# Patient Record
Sex: FEMALE | Race: WHITE | Employment: UNEMPLOYED | URBAN - METROPOLITAN AREA
[De-identification: names, ages, dates, MRNs, and addresses within clinical notes are randomized per-mention and may not be internally consistent; named-entity substitution may affect disease eponyms.]

---

## 2017-02-04 ENCOUNTER — TELEPHONE (OUTPATIENT)
Dept: INTERNAL MEDICINE CLINIC | Age: 67
End: 2017-02-04

## 2017-02-04 DIAGNOSIS — M62.830 SPASM OF LUMBAR PARASPINOUS MUSCLE: Primary | ICD-10-CM

## 2017-02-04 RX ORDER — CYCLOBENZAPRINE HCL 5 MG
5 TABLET ORAL
Qty: 50 TAB | Refills: 2 | Status: SHIPPED | OUTPATIENT
Start: 2017-02-04 | End: 2018-09-24 | Stop reason: SDUPTHER

## 2017-02-04 NOTE — TELEPHONE ENCOUNTER
Pt with worsened lower back pain, spasms today. Similar problem in past has improved with muscle relaxant. Asking for refill on flexeril. New medication or Refill was escribed to patient's pharmacy as requested.

## 2017-02-07 ENCOUNTER — APPOINTMENT (OUTPATIENT)
Dept: GENERAL RADIOLOGY | Age: 67
End: 2017-02-07
Attending: EMERGENCY MEDICINE
Payer: MEDICARE

## 2017-02-07 ENCOUNTER — HOSPITAL ENCOUNTER (EMERGENCY)
Age: 67
Discharge: HOME | End: 2017-02-07
Attending: EMERGENCY MEDICINE
Payer: MEDICARE

## 2017-02-07 VITALS
TEMPERATURE: 97.6 F | SYSTOLIC BLOOD PRESSURE: 118 MMHG | RESPIRATION RATE: 16 BRPM | HEART RATE: 74 BPM | OXYGEN SATURATION: 97 % | DIASTOLIC BLOOD PRESSURE: 70 MMHG

## 2017-02-07 DIAGNOSIS — M54.41 ACUTE RIGHT-SIDED LOW BACK PAIN WITH RIGHT-SIDED SCIATICA: Primary | ICD-10-CM

## 2017-02-07 LAB
APPEARANCE UR: CLEAR
BACTERIA URNS QL MICRO: NEGATIVE /HPF
BILIRUB UR QL: NEGATIVE
COLOR UR: ABNORMAL
EPITH CASTS URNS QL MICRO: ABNORMAL /LPF
GLUCOSE UR STRIP.AUTO-MCNC: NEGATIVE MG/DL
HGB UR QL STRIP: ABNORMAL
KETONES UR QL STRIP.AUTO: NEGATIVE MG/DL
LEUKOCYTE ESTERASE UR QL STRIP.AUTO: NEGATIVE
NITRITE UR QL STRIP.AUTO: NEGATIVE
PH UR STRIP: 7 [PH] (ref 5–8)
PROT UR STRIP-MCNC: NEGATIVE MG/DL
RBC #/AREA URNS HPF: ABNORMAL /HPF (ref 0–5)
SP GR UR REFRACTOMETRY: 1.01 (ref 1–1.03)
UA: UC IF INDICATED,UAUC: ABNORMAL
UROBILINOGEN UR QL STRIP.AUTO: 0.2 EU/DL (ref 0.2–1)
WBC URNS QL MICRO: ABNORMAL /HPF (ref 0–4)

## 2017-02-07 PROCEDURE — 72100 X-RAY EXAM L-S SPINE 2/3 VWS: CPT

## 2017-02-07 PROCEDURE — 81001 URINALYSIS AUTO W/SCOPE: CPT | Performed by: EMERGENCY MEDICINE

## 2017-02-07 PROCEDURE — 99283 EMERGENCY DEPT VISIT LOW MDM: CPT

## 2017-02-07 PROCEDURE — 74011250637 HC RX REV CODE- 250/637: Performed by: EMERGENCY MEDICINE

## 2017-02-07 PROCEDURE — 73502 X-RAY EXAM HIP UNI 2-3 VIEWS: CPT

## 2017-02-07 RX ORDER — HYDROCODONE BITARTRATE AND ACETAMINOPHEN 5; 325 MG/1; MG/1
1 TABLET ORAL
Status: COMPLETED | OUTPATIENT
Start: 2017-02-07 | End: 2017-02-07

## 2017-02-07 RX ORDER — METHYLPREDNISOLONE 4 MG/1
TABLET ORAL
Qty: 1 DOSE PACK | Refills: 0 | Status: SHIPPED | OUTPATIENT
Start: 2017-02-07 | End: 2017-07-11 | Stop reason: ALTCHOICE

## 2017-02-07 RX ORDER — HYDROCODONE BITARTRATE AND ACETAMINOPHEN 5; 325 MG/1; MG/1
1 TABLET ORAL
Qty: 20 TAB | Refills: 0 | Status: SHIPPED | OUTPATIENT
Start: 2017-02-07 | End: 2017-07-11 | Stop reason: ALTCHOICE

## 2017-02-07 RX ORDER — DIAZEPAM 5 MG/1
2.5 TABLET ORAL
Status: COMPLETED | OUTPATIENT
Start: 2017-02-07 | End: 2017-02-07

## 2017-02-07 RX ADMIN — DIAZEPAM 2.5 MG: 5 TABLET ORAL at 18:35

## 2017-02-07 RX ADMIN — HYDROCODONE BITARTRATE AND ACETAMINOPHEN 1 TABLET: 5; 325 TABLET ORAL at 18:35

## 2017-02-07 NOTE — ED PROVIDER NOTES
Patient is a 78 y.o. female presenting with back pain. Back Pain    Associated symptoms include weakness. Pertinent negatives include no fever, no numbness, no headaches, no abdominal pain and no dysuria. 77 yo WF presents with right lower back pain radiating to right hip onset about 1 week ago. No obvious injury but was lifting her mattress to make her bed prior to onset of pain. Pain worse with movement. Denies bowel/bladder incontinence or saddle anesthesia, fever, chills, dysuria, hematuria. Hx of epidural injections, last done . Taking flexeril and celebrex for symptoms. Past Medical History:   Diagnosis Date    Agatston coronary artery calcium score less than 100 2015     zero    Angiomyolipoma of right kidney      3.3 cm upper pole 11/8/15    AR (allergic rhinitis)     Colon polyp 2009     Tubular adenoma. repeat 3/2013, 3/2014 2 polyps    DDD (degenerative disc disease), lumbar      Dr. Heidi Carr. L3-L4, L4-L5 injections . radiates to left knee    GERD (gastroesophageal reflux disease)     Gluten intolerance     Hyperlipidemia 2009    Osteopenia 2014     mild 14 T-1.6 L fem neck    Raynaud phenomenon     SCC (squamous cell carcinoma)      right forearm.  Dr. Gabriel Aden       Past Surgical History:   Procedure Laterality Date    Hx gi       perianal cyst    Hx hysterectomy      Hx oophorectomy      Hx  section       2    Vascular surgery procedure unlist       venous stripping R    Hx breast reduction      Hx tonsillectomy      Hx heent       face lift    Hx colonoscopy  3/31/14     2 polyps    Hx orthopaedic       L shoulder     Hx orthopaedic       L ankle surgery    Hx knee arthroscopy       L knee - Zaslov    Hx knee replacement Left 2012         Family History:   Problem Relation Age of Onset    Heart Failure Mother     Hypertension Mother     Stroke Mother     High Cholesterol Mother     Thyroid Disease Mother    Alyne Yoly Rashes/Skin Problems Mother     Cancer Mother      uterine    Heart Disease Mother      premature    Heart Attack Mother     Hypertension Father     Heart Failure Father     High Cholesterol Father     Dementia Father     Heart Disease Father     Stroke Father     Other Sister      deep venous thromboses and Pulmonary embolus    Cancer Paternal Aunt      colon    Diabetes Paternal Grandmother     Cancer Other      colon       Social History     Social History    Marital status:      Spouse name: N/A    Number of children: N/A    Years of education: N/A     Occupational History    Not on file. Social History Main Topics    Smoking status: Never Smoker    Smokeless tobacco: Never Used    Alcohol use 1.0 oz/week     2 Glasses of wine per week    Drug use: No    Sexual activity: No     Other Topics Concern    Not on file     Social History Narrative         ALLERGIES: Alfentanil; Gluten; Levaquin [levofloxacin]; Morphine; Percocet [oxycodone-acetaminophen]; and Tramadol    Review of Systems   Constitutional: Negative for chills and fever. Respiratory: Negative for cough and shortness of breath. Gastrointestinal: Negative for abdominal pain, nausea and vomiting. Genitourinary: Negative for dysuria and hematuria. Musculoskeletal: Positive for back pain. Negative for neck pain and neck stiffness. Skin: Negative for rash and wound. Neurological: Positive for weakness. Negative for numbness and headaches. All other systems reviewed and are negative. There were no vitals filed for this visit.          Physical Exam   Physical Examination: General appearance - alert, mild distress, oriented to person, place, and time and normal appearing weight  Eyes - pupils equal and reactive, extraocular eye movements intact  Neck - supple, no significant adenopathy  Chest - clear to auscultation, no wheezes, rales or rhonchi, symmetric air entry  Heart - normal rate, regular rhythm, normal S1, S2, no murmurs, rubs, clicks or gallops  Abdomen - soft, nontender, nondistended, no masses or organomegaly  Back exam - full range of motion, no midline spinal tenderness or stepoff, tenderness to right paraspinal muscles in lumbar region, no rash  Neurological - alert, oriented, normal speech, no focal findings or movement disorder noted  Musculoskeletal - no joint tenderness, deformity or swelling  Extremities - peripheral pulses normal, no pedal edema, no clubbing or cyanosis  Skin - normal coloration and turgor, no rashes, no suspicious skin lesions noted  MDM  Number of Diagnoses or Management Options  Acute right-sided low back pain with right-sided sciatica:      Amount and/or Complexity of Data Reviewed  Clinical lab tests: ordered and reviewed  Tests in the radiology section of CPT®: ordered and reviewed  Decide to obtain previous medical records or to obtain history from someone other than the patient: yes  Obtain history from someone other than the patient: yes (sister)  Review and summarize past medical records: yes  Independent visualization of images, tracings, or specimens: yes    Patient Progress  Patient progress: stable    ED Course       Procedures  Pt with right low back pain radiating down right leg, no CVAT or abdominal tenderness. Denies fever, chills, bowel/bladder incontinence or saddle anesthesia. Will tx with steroids and pain medication. Pt with hx of low back pain and steroid injections, followed at [facility]. Will d/c with pcp/ortho f/u. Pt to return to ED for worsening symptoms.

## 2017-02-07 NOTE — ED TRIAGE NOTES
Patient presents ambulatory to treatment area with a steady gait. Patient complains of right sided lower back and hip pain that started about one week ago and has gotten worse since onset. Patient states she has been to the chiropractor for the pain three times since pain onset. Patient states she has a \"bulging disc\" on the left side, but the right sided pain is new. PCP called a prescription in for flexeril; patient states \"I have been using quite a bit of it\". Patient denies loss of control of bowel or bladder.

## 2017-02-08 NOTE — ED NOTES
Patient has returned from x-ray in no distress; moderate discomfort. Patient given blanket for comfort. Family at bedside. Call bell in reach. Will continue to monitor.

## 2017-02-08 NOTE — ED NOTES
The patient was discharged home by Dr. Jameel Rodriguez in stable condition. The patient is alert and oriented, in no respiratory distress and discharge vital signs obtained. The patient's diagnosis, condition and treatment were explained. The patient expressed understanding. Two prescriptions given. No work/school note given. A discharge plan has been developed. A  was not involved in the process. Aftercare instructions were given. Pt ambulatory out of the ED.

## 2017-02-08 NOTE — DISCHARGE INSTRUCTIONS
Back Pain: Care Instructions  Your Care Instructions    Back pain has many possible causes. It is often related to problems with muscles and ligaments of the back. It may also be related to problems with the nerves, discs, or bones of the back. Moving, lifting, standing, sitting, or sleeping in an awkward way can strain the back. Sometimes you don't notice the injury until later. Arthritis is another common cause of back pain. Although it may hurt a lot, back pain usually improves on its own within several weeks. Most people recover in 12 weeks or less. Using good home treatment and being careful not to stress your back can help you feel better sooner. Follow-up care is a key part of your treatment and safety. Be sure to make and go to all appointments, and call your doctor if you are having problems. Its also a good idea to know your test results and keep a list of the medicines you take. How can you care for yourself at home? · Sit or lie in positions that are most comfortable and reduce your pain. Try one of these positions when you lie down:  ¨ Lie on your back with your knees bent and supported by large pillows. ¨ Lie on the floor with your legs on the seat of a sofa or chair. Clovia Evener on your side with your knees and hips bent and a pillow between your legs. ¨ Lie on your stomach if it does not make pain worse. · Do not sit up in bed, and avoid soft couches and twisted positions. Bed rest can help relieve pain at first, but it delays healing. Avoid bed rest after the first day of back pain. · Change positions every 30 minutes. If you must sit for long periods of time, take breaks from sitting. Get up and walk around, or lie in a comfortable position. · Try using a heating pad on a low or medium setting for 15 to 20 minutes every 2 or 3 hours. Try a warm shower in place of one session with the heating pad. · You can also try an ice pack for 10 to 15 minutes every 2 to 3 hours.  Put a thin cloth between the ice pack and your skin. · Take pain medicines exactly as directed. ¨ If the doctor gave you a prescription medicine for pain, take it as prescribed. ¨ If you are not taking a prescription pain medicine, ask your doctor if you can take an over-the-counter medicine. · Take short walks several times a day. You can start with 5 to 10 minutes, 3 or 4 times a day, and work up to longer walks. Walk on level surfaces and avoid hills and stairs until your back is better. · Return to work and other activities as soon as you can. Continued rest without activity is usually not good for your back. · To prevent future back pain, do exercises to stretch and strengthen your back and stomach. Learn how to use good posture, safe lifting techniques, and proper body mechanics. When should you call for help? Call your doctor now or seek immediate medical care if:  · You have new or worsening numbness in your legs. · You have new or worsening weakness in your legs. (This could make it hard to stand up.)  · You lose control of your bladder or bowels. Watch closely for changes in your health, and be sure to contact your doctor if:  · Your pain gets worse. · You are not getting better after 2 weeks. Where can you learn more? Go to http://catrachito-chetan.info/. Enter H684 in the search box to learn more about \"Back Pain: Care Instructions. \"  Current as of: May 23, 2016  Content Version: 11.1  © 5016-6495 [employer]. Care instructions adapted under license by [employer] (which disclaims liability or warranty for this information). If you have questions about a medical condition or this instruction, always ask your healthcare professional. [address] any warranty or liability for your use of this information. Sciatica: Exercises  Your Care Instructions  Here are some examples of typical rehabilitation exercises for your condition.  Start each exercise slowly. Ease off the exercise if you start to have pain. Your doctor or physical therapist will tell you when you can start these exercises and which ones will work best for you. When you are not being active, find a comfortable position for rest. Some people are comfortable on the floor or a medium-firm bed with a small pillow under their head and another under their knees. Some people prefer to lie on their side with a pillow between their knees. Don't stay in one position for too long. Take short walks (10 to 20 minutes) every 2 to 3 hours. Avoid slopes, hills, and stairs until you feel better. Walk only distances you can manage without pain, especially leg pain. How to do the exercises  Back stretches    1. Get down on your hands and knees on the floor. 2. Relax your head and allow it to droop. Round your back up toward the ceiling until you feel a nice stretch in your upper, middle, and lower back. Hold this stretch for as long as it feels comfortable, or about 15 to 30 seconds. 3. Return to the starting position with a flat back while you are on your hands and knees. 4. Let your back sway by pressing your stomach toward the floor. Lift your buttocks toward the ceiling. 5. Hold this position for 15 to 30 seconds. 6. Repeat 2 to 4 times. Follow-up care is a key part of your treatment and safety. Be sure to make and go to all appointments, and call your doctor if you are having problems. It's also a good idea to know your test results and keep a list of the medicines you take. Where can you learn more? Go to http://catrachito-chetan.info/. Enter R759 in the search box to learn more about \"Sciatica: Exercises. \"  Current as of: May 23, 2016  Content Version: 11.1  © 2659-2814 [employer]. Care instructions adapted under license by [employer] (which disclaims liability or warranty for this information).  If you have questions about a medical condition or this instruction, always ask your healthcare professional. [place] [address] any warranty or liability for your use of this information. Sciatica: Care Instructions  Your Care Instructions    Sciatica (say \"noh-XJ-tb-kuh\") is an irritation of one of the sciatic nerves, which come from the spinal cord in the lower back. The sciatic nerves and their branches extend down through the buttock to the foot. Sciatica can develop when an injured disc in the back presses against a spinal nerve root. Its main symptom is pain, numbness, or weakness that is often worse in the leg or foot than in the back. Sciatica often will improve and go away with time. Early treatment usually includes medicines and exercises to relieve pain. Follow-up care is a key part of your treatment and safety. Be sure to make and go to all appointments, and call your doctor if you are having problems. It's also a good idea to know your test results and keep a list of the medicines you take. How can you care for yourself at home? · Take pain medicines exactly as directed. ¨ If the doctor gave you a prescription medicine for pain, take it as prescribed. ¨ If you are not taking a prescription pain medicine, ask your doctor if you can take an over-the-counter medicine. · Use heat or ice to relieve pain. ¨ To apply heat, put a warm water bottle, heating pad set on low, or warm cloth on your back. Do not go to sleep with a heating pad on your skin. ¨ To use ice, put ice or a cold pack on the area for 10 to 20 minutes at a time. Put a thin cloth between the ice and your skin. · Avoid sitting if possible, unless it feels better than standing. · Alternate lying down with short walks. Increase your walking distance as you are able to without making your symptoms worse. · Do not do anything that makes your symptoms worse. When should you call for help? Call 911 anytime you think you may need emergency care.  For example, call if:  · You are unable to move a leg at all. Call your doctor now or seek immediate medical care if:  · You have new or worse symptoms in your legs or buttocks. Symptoms may include:  ¨ Numbness or tingling. ¨ Weakness. ¨ Pain. · You lose bladder or bowel control. Watch closely for changes in your health, and be sure to contact your doctor if:  · You are not getting better as expected. Where can you learn more? Go to http://catrachito-chetan.info/. Enter [phone] in the search box to learn more about \"Sciatica: Care Instructions. \"  Current as of: May 23, 2016  Content Version: 11.1  © 4872-7828 [employer]. Care instructions adapted under license by [employer] (which disclaims liability or warranty for this information). If you have questions about a medical condition or this instruction, always ask your healthcare professional. [place] [address] any warranty or liability for your use of this information. We hope that we have addressed all of your medical concerns. The examination and treatment you received in the Emergency Department were for an emergent problem and were not intended as complete care. It is important that you follow up with your healthcare provider(s) for ongoing care. If your symptoms worsen or do not improve as expected, and you are unable to reach your usual health care provider(s), you should return to the Emergency Department. Today's healthcare is undergoing tremendous change, and patient satisfaction surveys are one of the many tools to assess the quality of medical care. You may receive a survey from the [employer] organization regarding your experience in the Emergency Department. I hope that your experience has been completely positive, particularly the medical care that I provided. As such, please participate in the survey; anything less than excellent does not meet my expectations or intentions. [address] [facility] and [facility] participate in nationally recognized quality of care measures. If your blood pressure is greater than 120/80, as reported below, we urge that you seek medical care to address the potential of high blood pressure, commonly known as hypertension. Hypertension can be hereditary or can be caused by certain medical conditions, pain, stress, or \"white coat syndrome. \"       Please make an appointment with your health care provider(s) for follow up of your Emergency Department visit. VITALS:   Patient Vitals for the past 8 hrs:   Temp Pulse Resp BP SpO2   02/07/17 1805 97.6 °F (36.4 °C) 84 20 147/75 98 %          Thank you for allowing us to provide you with medical care today. We realize that you have many choices for your emergency care needs. Please choose us in the future for any continued health care needs. Constantin Mcmahan Steve Patrick, [address] [place] [address] 20.   Office: [phone]            Recent Results (from the past 24 hour(s))   URINALYSIS W/ REFLEX CULTURE    Collection Time: 02/07/17  6:37 PM   Result Value Ref Range    Color YELLOW/STRAW      Appearance CLEAR CLEAR      Specific gravity 1.015 1.003 - 1.030      pH (UA) 7.0 5.0 - 8.0      Protein NEGATIVE  NEG mg/dL    Glucose NEGATIVE  NEG mg/dL    Ketone NEGATIVE  NEG mg/dL    Bilirubin NEGATIVE  NEG      Blood TRACE (A) NEG      Urobilinogen 0.2 0.2 - 1.0 EU/dL    Nitrites NEGATIVE  NEG      Leukocyte Esterase NEGATIVE  NEG      WBC 0-4 0 - 4 /hpf    RBC 0-5 0 - 5 /hpf    Epithelial cells FEW FEW /lpf    Bacteria NEGATIVE  NEG /hpf    UA:UC IF INDICATED CULTURE NOT INDICATED BY UA RESULT CNI         Xr Spine Lumb 2 Or 3 V    Result Date: 2/7/2017  EXAM:  XR SPINE LUMB 2 OR 3 V INDICATION:   low back pain COMPARISON: CT of the abdomen and pelvis, 8/16/2016 .  FINDINGS: AP, lateral and spot lateral views of the lumbar spine demonstrate normal alignment. Transitional L5 vertebral body. The vertebral body heights and disc spaces are well-preserved. No visible fracture or subluxation . IMPRESSION:  1. No visible fracture or subluxation. 2. Nonfocal degenerative changes with a transitional L5 vertebral body. Xr Hip Rt W Or Wo Pelv 2-3 Vws    Result Date: 2/7/2017  EXAM:  XR HIP RT W OR WO PELV 2-3 VWS INDICATION:   pain. Additional history: Right-sided lower back pain and hip pain that started about one week previously. Pain has progressed. COMPARISON: None. Peggyann Gang FINDINGS: An AP view of the pelvis and a frogleg lateral view of the right hip demonstrate no fracture, dislocation or other acute abnormality. .    IMPRESSION:  No acute abnormality.

## 2017-02-09 ENCOUNTER — TELEPHONE (OUTPATIENT)
Dept: INTERNAL MEDICINE CLINIC | Age: 67
End: 2017-02-09

## 2017-02-09 NOTE — TELEPHONE ENCOUNTER
Reached out to patient to schedule ED follow up appointment to address back pain. No answer left V/M.

## 2017-02-09 NOTE — TELEPHONE ENCOUNTER
----- Message from Wilbert Veliz sent at 2/9/2017 12:42 PM EST -----  Regarding: Port/telephone  Pt stated she is having back pain and she is requesting to have an MRI or a CT scan. Pts number is [phone].

## 2017-02-10 ENCOUNTER — PATIENT OUTREACH (OUTPATIENT)
Dept: INTERNAL MEDICINE CLINIC | Age: 67
End: 2017-02-10

## 2017-02-10 NOTE — PROGRESS NOTES
NNTOCED Call    Patient discharged on 2/7/17 from [facility]. Diagnosis: Acute right-sided low back pain. Spoke with patient (identified by 2 patient identifiers) today. Home medication reconciliation completed & patient verbalizes understanding of medications. Reviewed discharge instructions with the patient & patient verbalizes understanding. Patient understands when to seek medical attention from PCP. Patient verbalizes understanding of discharge plan and will follow up with PCP Dr. Haroon Julien on 2/13/17. Pt stated that she continues to have lumbar pain radiating around to right hip. 6/10 on pain scale with pain med. Pt denies issues with urination, N/V, diarrhea/constipation or abdominal pain. Patient was given the opportunity to ask questions & contact information was provided for future reference or further questions. This note will not be viewable in [address]

## 2017-02-13 ENCOUNTER — OFFICE VISIT (OUTPATIENT)
Dept: INTERNAL MEDICINE CLINIC | Age: 67
End: 2017-02-13

## 2017-02-13 VITALS
BODY MASS INDEX: 26.75 KG/M2 | WEIGHT: 151 LBS | TEMPERATURE: 98.7 F | RESPIRATION RATE: 12 BRPM | HEIGHT: 63 IN | HEART RATE: 85 BPM | SYSTOLIC BLOOD PRESSURE: 114 MMHG | DIASTOLIC BLOOD PRESSURE: 76 MMHG

## 2017-02-13 DIAGNOSIS — M51.36 DDD (DEGENERATIVE DISC DISEASE), LUMBAR: ICD-10-CM

## 2017-02-13 DIAGNOSIS — M54.16 ACUTE RIGHT LUMBAR RADICULOPATHY: Primary | ICD-10-CM

## 2017-02-13 NOTE — PROGRESS NOTES
HISTORY OF PRESENT ILLNESS    Chief Complaint   Patient presents with    Back Pain     Reports right lower back pain radiating to right buttock and lateral thigh to knee for 3 weeks. See in ER and given medrol with mild relief since . S/s are worsening in the afternoon since she is weaning medrol. Taking flexeril and norco prn which help, but she does not like taking meds. Seeing chiropractor with some relief over the years for lumbar DDD and she feels imaging is needed. chiro is using minimal manipulation, e-stim as well  Hx of cortisone injections x2 2-3 years ago  Saw Dr. Vignesh Grady 2 years ago and was told her left leg pain may be partially due to her left knee  Was previously seen by Dr. Gordon Finely  She also saw Dr. Kimani Pacheco and Dr. Cecily Montez in the past.    Lenka Mcginnis in [place] showed 1. No visible fracture or subluxation. 2. Nonfocal degenerative changes with a transitional L5 vertebral body. Review of Systems   All other systems reviewed and are negative, except as noted in HPI    Past Medical and Surgical History   has a past medical history of Agatston coronary artery calcium score less than 100 (2015); Angiomyolipoma of right kidney; AR (allergic rhinitis); Colon polyp (2009); DDD (degenerative disc disease), lumbar; GERD (gastroesophageal reflux disease); Gluten intolerance; Hyperlipidemia (2009); Osteopenia (2014); Raynaud phenomenon; and SCC (squamous cell carcinoma) (). She also has no past medical history of Abuse; Anemia NEC; Arrhythmia; Asthma; Autoimmune disease ([address] CAD (coronary artery disease); Calculus of kidney; Chronic kidney disease; Chronic pain; Congestive heart failure, unspecified; COPD; Depression; Diabetes ([address] Headache(784.0); Hypertension; Liver disease; Psychotic disorder; PUD (peptic ulcer disease); Seizures ([address] Stroke [facility]); Thromboembolus ([address] Thyroid disease; or Trauma.    has a past surgical history that includes gi; hysterectomy; oophorectomy;  section; vascular surgery procedure unlist; breast reduction (2009); tonsillectomy; heent; colonoscopy (3/31/14); orthopaedic; orthopaedic; knee arthroscopy; and knee replacement (Left, 1/2012). reports that she has never smoked. She has never used smokeless tobacco. She reports that she drinks about 1.0 oz of alcohol per week  She reports that she does not use illicit drugs. family history includes Cancer in her mother, paternal aunt, and another family member; Dementia in her father; Diabetes in her paternal grandmother; Heart Attack in her mother; Heart Disease in her father and mother; Heart Failure in her father and mother; High Cholesterol in her father and mother; Hypertension in her father and mother; Other in her sister; Rashes/Skin Problems in her mother; Stroke in her father and mother; Thyroid Disease in her mother. Physical Exam   Nursing note and vitals reviewed. Blood pressure 114/76, pulse 85, temperature 98.7 °F (37.1 °C), temperature source Oral, resp. rate 12, height 5' 3\" (1.6 m), weight 151 lb (68.5 kg), last menstrual period 01/01/2000. Constitutional:  No distress. Eyes: Conjunctivae are normal.   Ears:  Hearing grossly intact  Cardiovascular: Normal rate. regular rhythm, no murmurs or gallops  No edema  Pulmonary/Chest: Effort normal.   CTAB  Musculoskeletal: moves all 4 extremities   Positive SLR on right at 45 degrees. No weakness. Toes downgoing. 5/5 DTRs  Neurological: Alert and oriented to person, place, and time. Skin: No rash noted. Psychiatric: Normal mood and affect. Behavior is normal.     ASSESSMENT and PLAN  Mumtaz Silvestre was seen today for back pain. Diagnoses and all orders for this visit:    Acute right lumbar radiculopathy  DDD (degenerative disc disease), lumbar  Previous MRI did not show an explanation and EMG was normal.   Needs repeat MRI.    Consider neurology consult if normal.    -     MRI LUMB SPINE WO CONT; Future      There are no Patient Instructions on file for this visit.    lab results and schedule of future lab studies reviewed with patient  reviewed medications and side effects in detail    Return to clinic for further evaluation if new symptoms develop    Follow-up Disposition: Not on File    Current Outpatient Prescriptions   Medication Sig    methylPREDNISolone (MEDROL, FERMIN,) 4 mg tablet Use as directed    HYDROcodone-acetaminophen (NORCO) 5-325 mg per tablet Take 1 Tab by mouth every four (4) hours as needed for Pain. Max Daily Amount: 6 Tabs.  cyclobenzaprine (FLEXERIL) 5 mg tablet Take 1 Tab by mouth three (3) times daily as needed for Muscle Spasm(s).  pravastatin (PRAVACHOL) 20 mg tablet TAKE 1 TABLET BY MOUTH NIGHTLY    celecoxib (CELEBREX) 200 mg capsule TAKE ONE CAPSULE BY MOUTH EVERY DAY    ondansetron (ZOFRAN ODT) 4 mg disintegrating tablet Take 1 Tab by mouth every eight (8) hours as needed for Nausea.  estradiol (ESTRACE) 0.01 % (0.1 mg/gram) vaginal cream USE 1 GRAM VAGINALLY 3 TIMES WEEKLY    cholecalciferol, vitamin D3, (VITAMIN D3) 2,000 unit tab Take  by mouth.  DOCOSAHEXANOIC ACID/EPA (FISH OIL PO) Take  by mouth.  coenzyme q10 (CO Q-10) 10 mg cap Take  by mouth.  ASCORBATE CALCIUM (VITAMIN C PO) Take  by mouth.  esomeprazole (NEXIUM) 40 mg capsule Take 1 capsule by mouth daily.  fluticasone (FLONASE) 50 mcg/actuation nasal spray USE 2 SPRAYS IN EACH NOSTRIL DAILY AS NEEDED FOR ALLERGIES    loratadine (CLARITIN) 10 mg tablet Take 10 mg by mouth daily.  MULTIVITAMIN (MULTIPLE VITAMIN PO) Take  by mouth. No current facility-administered medications for this visit.

## 2017-02-22 ENCOUNTER — HOSPITAL ENCOUNTER (OUTPATIENT)
Dept: MRI IMAGING | Age: 67
Discharge: HOME | End: 2017-02-22
Attending: INTERNAL MEDICINE
Payer: MEDICARE

## 2017-02-22 DIAGNOSIS — M51.36 DDD (DEGENERATIVE DISC DISEASE), LUMBAR: ICD-10-CM

## 2017-02-22 DIAGNOSIS — M54.16 ACUTE RIGHT LUMBAR RADICULOPATHY: ICD-10-CM

## 2017-02-22 PROCEDURE — 72148 MRI LUMBAR SPINE W/O DYE: CPT

## 2017-02-23 ENCOUNTER — TELEPHONE (OUTPATIENT)
Dept: INTERNAL MEDICINE CLINIC | Age: 67
End: 2017-02-23

## 2017-02-23 DIAGNOSIS — G95.89 LUMBAR EPIDURAL MASS (HCC): Primary | ICD-10-CM

## 2017-02-23 NOTE — TELEPHONE ENCOUNTER
Pt would like to have the MRI with contrast first and take the disc with her to Neurologist appointment .

## 2017-02-23 NOTE — TELEPHONE ENCOUNTER
Pt call in would like a call back from the Dr Eduardo Puente regarding the MRI results from yesterday. If Dr Eduardo Puente  could call back with the results. she is having problems with "[email or web address] I did give number for the helpline.

## 2017-02-27 ENCOUNTER — HOSPITAL ENCOUNTER (OUTPATIENT)
Dept: MRI IMAGING | Age: 67
Discharge: HOME | End: 2017-02-27
Attending: INTERNAL MEDICINE
Payer: MEDICARE

## 2017-02-27 VITALS — BODY MASS INDEX: 26.04 KG/M2 | WEIGHT: 147 LBS

## 2017-02-27 DIAGNOSIS — G95.89 LUMBAR EPIDURAL MASS (HCC): ICD-10-CM

## 2017-02-27 PROCEDURE — 74011250636 HC RX REV CODE- 250/636: Performed by: RADIOLOGY

## 2017-02-27 PROCEDURE — 72149 MRI LUMBAR SPINE W/DYE: CPT

## 2017-02-27 PROCEDURE — A9585 GADOBUTROL INJECTION: HCPCS | Performed by: RADIOLOGY

## 2017-02-27 RX ADMIN — GADOBUTROL 6.5 ML: 604.72 INJECTION INTRAVENOUS at 07:25

## 2017-07-04 RX ORDER — PRAVASTATIN SODIUM 20 MG/1
TABLET ORAL
Qty: 30 TAB | Refills: 5 | Status: SHIPPED | OUTPATIENT
Start: 2017-07-04 | End: 2018-01-07 | Stop reason: SDUPTHER

## 2017-07-11 ENCOUNTER — HOSPITAL ENCOUNTER (OUTPATIENT)
Dept: LAB | Age: 67
Discharge: HOME | End: 2017-07-11
Payer: MEDICARE

## 2017-07-11 ENCOUNTER — HOSPITAL ENCOUNTER (OUTPATIENT)
Dept: GENERAL RADIOLOGY | Age: 67
Discharge: HOME | End: 2017-07-11
Attending: INTERNAL MEDICINE
Payer: MEDICARE

## 2017-07-11 ENCOUNTER — OFFICE VISIT (OUTPATIENT)
Dept: INTERNAL MEDICINE CLINIC | Age: 67
End: 2017-07-11

## 2017-07-11 VITALS
DIASTOLIC BLOOD PRESSURE: 75 MMHG | HEART RATE: 79 BPM | WEIGHT: 156 LBS | HEIGHT: 63 IN | TEMPERATURE: 98 F | RESPIRATION RATE: 12 BRPM | SYSTOLIC BLOOD PRESSURE: 115 MMHG | BODY MASS INDEX: 27.64 KG/M2

## 2017-07-11 DIAGNOSIS — Z00.00 PREVENTATIVE HEALTH CARE: ICD-10-CM

## 2017-07-11 DIAGNOSIS — E55.9 VITAMIN D INSUFFICIENCY: ICD-10-CM

## 2017-07-11 DIAGNOSIS — K63.5 POLYP OF COLON, UNSPECIFIED PART OF COLON, UNSPECIFIED TYPE: ICD-10-CM

## 2017-07-11 DIAGNOSIS — M85.80 OSTEOPENIA, UNSPECIFIED LOCATION: Chronic | ICD-10-CM

## 2017-07-11 DIAGNOSIS — Z23 ENCOUNTER FOR IMMUNIZATION: ICD-10-CM

## 2017-07-11 DIAGNOSIS — Z78.0 ASYMPTOMATIC MENOPAUSAL STATE: ICD-10-CM

## 2017-07-11 DIAGNOSIS — R10.31 COLICKY RLQ ABDOMINAL PAIN: ICD-10-CM

## 2017-07-11 DIAGNOSIS — Z00.00 INITIAL MEDICARE ANNUAL WELLNESS VISIT: Primary | ICD-10-CM

## 2017-07-11 DIAGNOSIS — M51.36 DDD (DEGENERATIVE DISC DISEASE), LUMBAR: ICD-10-CM

## 2017-07-11 DIAGNOSIS — E78.5 HYPERLIPIDEMIA, UNSPECIFIED HYPERLIPIDEMIA TYPE: ICD-10-CM

## 2017-07-11 PROCEDURE — 74000 XR ABD (KUB): CPT

## 2017-07-11 PROCEDURE — 80053 COMPREHEN METABOLIC PANEL: CPT

## 2017-07-11 PROCEDURE — 85027 COMPLETE CBC AUTOMATED: CPT

## 2017-07-11 PROCEDURE — 80061 LIPID PANEL: CPT

## 2017-07-11 PROCEDURE — 82306 VITAMIN D 25 HYDROXY: CPT

## 2017-07-11 NOTE — PROGRESS NOTES
Subjective:       Dino Steward is a 78 y.o. female who returns today for follow up of Hyperlipidemia with a lipid program recheck. Audie Baeza indicates her exercise level as {exercise level:59313}. Diet: ***  Medications:   Current Outpatient Prescriptions   Medication Sig Dispense Refill    pravastatin (PRAVACHOL) 20 mg tablet TAKE 1 TABLET BY MOUTH NIGHTLY 30 Tab 5    cyclobenzaprine (FLEXERIL) 5 mg tablet Take 1 Tab by mouth three (3) times daily as needed for Muscle Spasm(s). 50 Tab 2    celecoxib (CELEBREX) 200 mg capsule TAKE ONE CAPSULE BY MOUTH EVERY DAY 60 Cap 3    estradiol (ESTRACE) 0.01 % (0.1 mg/gram) vaginal cream USE 1 GRAM VAGINALLY 3 TIMES WEEKLY 42.5 g 3    cholecalciferol, vitamin D3, (VITAMIN D3) 2,000 unit tab Take  by mouth.  DOCOSAHEXANOIC ACID/EPA (FISH OIL PO) Take  by mouth.  coenzyme q10 (CO Q-10) 10 mg cap Take  by mouth.  ASCORBATE CALCIUM (VITAMIN C PO) Take  by mouth.  esomeprazole (NEXIUM) 40 mg capsule Take 1 capsule by mouth daily. 30 capsule 5    fluticasone (FLONASE) 50 mcg/actuation nasal spray USE 2 SPRAYS IN EACH NOSTRIL DAILY AS NEEDED FOR ALLERGIES 3 Bottle 8    loratadine (CLARITIN) 10 mg tablet Take 10 mg by mouth daily.  MULTIVITAMIN (MULTIPLE VITAMIN PO) Take  by mouth.  methylPREDNISolone (MEDROL, FERMIN,) 4 mg tablet Use as directed 1 Dose Pack 0    HYDROcodone-acetaminophen (NORCO) 5-325 mg per tablet Take 1 Tab by mouth every four (4) hours as needed for Pain. Max Daily Amount: 6 Tabs. 20 Tab 0    ondansetron (ZOFRAN ODT) 4 mg disintegrating tablet Take 1 Tab by mouth every eight (8) hours as needed for Nausea.  10 Tab 0       Objective:       Visit Vitals    /75 (BP 1 Location: Left arm, BP Patient Position: Sitting)    Pulse 79    Temp 98 °F (36.7 °C)    Resp 12    Ht 5' 3\" (1.6 m)    Wt 156 lb (70.8 kg)    LMP 01/01/2000    BMI 27.63 kg/m2     Wt Readings from Last 3 Encounters:   07/11/17 156 lb (70.8 kg) 02/27/17 147 lb (66.7 kg)   02/13/17 151 lb (68.5 kg)         Diagnostic Test:       Today's cholesterol labs: @LABRCNTIP(chol,trig,hdl,ldl)@    Impression:      {DIAGNOSES; CARDIAC HYPERCHOLESTEROLEMIA DIAGNOSES:99327}    Recommendations:     ***

## 2017-07-11 NOTE — PROGRESS NOTES
[facility]  Timeout Progress Note    Chart reviewed for allergies/reaction to any medications. TIMEOUT initiated prior to start of procedure:       Yes No: yes Patient identified by name and date of birth     Yes No: yes Informed consent obtained     Yes No: yes Procedure site marked and verified     Yes No: yes Procedure to be performed verified, patient confirms understanding     Yes No: yes Pain assessment pre-procedure - Pain 0/10     Yes No: yes Pain assessment post-procedure - Pain 0/10     Yes No: yes Patient education provided     Yes No: yes Post-procedure instructions provided to patient     Yes No: yes Patient verbalized understanding of procedure, education provided, and post-procedure instructions. Consent form signed and verified. Patient tolerated procedure well.

## 2017-07-11 NOTE — ACP (ADVANCE CARE PLANNING)
Advance Care Planning (ACP) Provider Note - Comprehensive     Date of ACP Conversation: 07/11/17  Persons included in Conversation:  patient  Length of ACP Conversation in minutes:  <16 minutes (Non-Billable)    Authorized Decision Maker (if patient is incapable of making informed decisions): This person is:  Healthcare Agent/Medical Power of  under Advance Directive          General ACP for ALL Patients with Decision Making Capacity:   Importance of advance care planning, including choosing a healthcare agent to communicate patient's healthcare decisions if patient lost the ability to make decisions, such as after a sudden illness or accident  Understanding of the healthcare agent role was assessed and information provided  Exploration of values, goals, and preferences if recovery is not expected, even with continued medical treatment in the event of: Imminent death  Severe, permanent brain injury    Review of Existing Advance Directive:  not availble     For Serious or Chronic Illness:  Understanding of medical condition    Understanding of CPR, goals and expected outcomes, benefits and burdens discussed. Information on CPR success rates provided (e.g. for CPR in hospital, survival to d/c at two weeks is 22%, for chronically ill or elderly/frail survival is less than 3%); Individual asked to communicate understanding of information in his/her own words.   Explored fears and concerns regarding CPR or possible outcomes    Interventions Provided:  Recommended completion of Advance Directive form after review of ACP materials and conversation with prospective healthcare agent   Recommended communicating the plan and making copies for the healthcare agent, personal physician, and others as appropriate (e.g., health system)

## 2017-07-11 NOTE — MR AVS SNAPSHOT
Visit Information Date & Time Provider Department Dept. Phone Encounter #  
 7/11/2017  8:45 AM Winnie Howard MD [facility] [address] [id number] Upcoming Health Maintenance Date Due  
 GLAUCOMA SCREENING Q2Y 11/1/2016 INFLUENZA AGE 9 TO ADULT 8/1/2017 BREAST CANCER SCRN MAMMOGRAM 6/17/2018 MEDICARE YEARLY EXAM 7/12/2018 DTaP/Tdap/Td series (2 - Td) 5/2/2021 COLONOSCOPY 3/31/2024 Allergies as of 7/11/2017  Review Complete On: 7/11/2017 By: Winnie Howard MD  
  
 Severity Noted Reaction Type Reactions Alfentanil  01/08/2009    Shortness of Breath Gluten  01/08/2009    Other (comments) Bloating, nausea, patient states that this can be removed from allergy list  
 Levaquin [Levofloxacin]  08/16/2016    Nausea Only Morphine  01/08/2009    Hives, Rash, Itching Percocet [Oxycodone-acetaminophen]  01/08/2009    Itching Tramadol  08/20/2012   Side Effect Nausea Only Current Immunizations  Reviewed on 7/22/2015 Name Date Hepatitis A Vaccine 1/8/1994, 1/8/1994 Hepatitis B Vaccine 1/8/1994, 1/8/1994, 1/8/1994 Influenza High Dose Vaccine PF 2/13/2017 Pneumococcal Conjugate (PCV-13) 7/24/2015 Pneumococcal Polysaccharide (PPSV-23)  Incomplete TDAP Vaccine 5/2/2011 Zoster Vaccine, Live 8/21/2013 Not reviewed this visit You Were Diagnosed With   
  
 Codes Comments Preventative health care    -  Primary ICD-10-CM: Z00.00 ICD-9-CM: V70.0 Asymptomatic menopausal state     ICD-10-CM: Z78.0 ICD-9-CM: V49.81 Encounter for immunization     ICD-10-CM: E30 ICD-9-CM: V03.89 Colicky RLQ abdominal pain     ICD-10-CM: R10.31 ICD-9-CM: 789.03   
 DDD (degenerative disc disease), lumbar     ICD-10-CM: M51.36 
ICD-9-CM: 722.52 Polyp of colon, unspecified part of colon, unspecified type     ICD-10-CM: K63.5 ICD-9-CM: 211.3 Hyperlipidemia, unspecified hyperlipidemia type     ICD-10-CM: E78.5 ICD-9-CM: 272.4 Osteopenia, unspecified location     ICD-10-CM: M85.80 ICD-9-CM: 733.90 Vitamin D insufficiency     ICD-10-CM: E55.9 ICD-9-CM: 268.9 Vitals BP Pulse Temp Resp Height(growth percentile) Weight(growth percentile) 115/75 (BP 1 Location: Left arm, BP Patient Position: Sitting) 79 98 °F (36.7 °C) 12 5' 3\" (1.6 m) 156 lb (70.8 kg) LMP BMI OB Status Smoking Status 01/01/2000 27.63 kg/m2 Hysterectomy Never Smoker Vitals History BMI and BSA Data Body Mass Index Body Surface Area  
 27.63 kg/m 2 1.77 m 2 Preferred Pharmacy Pharmacy Name Phone [id number] [place] - [address] [address] AT [address] [phone] Your Updated Medication List  
  
   
This list is accurate as of: 7/11/17  9:42 AM.  Always use your most recent med list.  
  
  
  
  
 celecoxib 200 mg capsule Commonly known as:  CELEBREX  
TAKE ONE CAPSULE BY MOUTH EVERY DAY  
  
 CLARITIN 10 mg tablet Generic drug:  loratadine Take 10 mg by mouth daily. CO Q-10 10 mg Cap Generic drug:  coenzyme q10 Take  by mouth. cyclobenzaprine 5 mg tablet Commonly known as:  FLEXERIL Take 1 Tab by mouth three (3) times daily as needed for Muscle Spasm(s). esomeprazole 40 mg capsule Commonly known as:  NexIUM Take 1 capsule by mouth daily. estradiol 0.01 % (0.1 mg/gram) vaginal cream  
Commonly known as:  ESTRACE  
USE 1 GRAM VAGINALLY 3 TIMES WEEKLY FISH OIL PO Take  by mouth. fluticasone 50 mcg/actuation nasal spray Commonly known as:  FLONASE  
USE 2 SPRAYS IN EACH NOSTRIL DAILY AS NEEDED FOR ALLERGIES MULTIPLE VITAMIN PO Take  by mouth.  
  
 pravastatin 20 mg tablet Commonly known as:  PRAVACHOL  
TAKE 1 TABLET BY MOUTH NIGHTLY  
  
 VITAMIN C PO Take  by mouth. VITAMIN D3 2,000 unit Tab Generic drug:  cholecalciferol (vitamin D3) Take  by mouth. We Performed the Following ADMIN PNEUMOCOCCAL VACCINE [ [facility]] CBC W/O DIFF [03140 CPT(R)] LIPID PANEL [89084 CPT(R)] METABOLIC PANEL, COMPREHENSIVE [76376 CPT(R)] PNEUMOCOCCAL POLYSACCHARIDE VACCINE, 23-VALENT, ADULT OR IMMUNOSUPPRESSED PT DOSE, [70488 CPT(R)] VITAMIN D, 25 HYDROXY [id number] CPT(R)] To-Do List   
 07/11/2017 Imaging:  DEXA BONE DENSITY STUDY AXIAL   
  
 07/11/2017 Imaging:  XR ABD (KUB) Introducing [facility]! Dear Jeannette Howard: Thank you for requesting a [employer] account. Our records indicate that you already have an active YeH[employer] account. You can access your account anytime at [email or web address] Did you know that you can access your hospital and ER discharge instructions at any time in YeHive? You can also review all of your test results from your hospital stay or ER visit. Additional Information If you have questions, please visit the Frequently Asked Questions section of the Ye[employer]ive website at [email or web address] Remember, YeHive is NOT to be used for urgent needs. For medical emergencies, dial 911. Now available from your iPhone and Android! Please provide this summary of care documentation to your next provider. Your primary care clinician is listed as Leon Martin. If you have any questions after today's visit, please call [phone].

## 2017-07-11 NOTE — PROGRESS NOTES
Presents for a follow up. States wanted a PE. She is fasting. Occasionally gets pain RLQ  for the last few months. Had laminectomy and diskectomy on 4/17/17 at [facility].

## 2017-07-12 LAB
25(OH)D3+25(OH)D2 SERPL-MCNC: 26.8 NG/ML (ref 30–100)
ALBUMIN SERPL-MCNC: 4.6 G/DL (ref 3.6–4.8)
ALBUMIN/GLOB SERPL: 2.1 {RATIO} (ref 1.2–2.2)
ALP SERPL-CCNC: 46 IU/L (ref 39–117)
ALT SERPL-CCNC: 17 IU/L (ref 0–32)
AST SERPL-CCNC: 16 IU/L (ref 0–40)
BILIRUB SERPL-MCNC: 0.2 MG/DL (ref 0–1.2)
BUN SERPL-MCNC: 18 MG/DL (ref 8–27)
BUN/CREAT SERPL: 26 (ref 12–28)
CALCIUM SERPL-MCNC: 9.3 MG/DL (ref 8.7–10.3)
CHLORIDE SERPL-SCNC: 99 MMOL/L (ref 96–106)
CHOLEST SERPL-MCNC: 223 MG/DL (ref 100–199)
CO2 SERPL-SCNC: 26 MMOL/L (ref 18–29)
CREAT SERPL-MCNC: 0.7 MG/DL (ref 0.57–1)
ERYTHROCYTE [DISTWIDTH] IN BLOOD BY AUTOMATED COUNT: 15.1 % (ref 12.3–15.4)
GLOBULIN SER CALC-MCNC: 2.2 G/DL (ref 1.5–4.5)
GLUCOSE SERPL-MCNC: 94 MG/DL (ref 65–99)
HCT VFR BLD AUTO: 38.4 % (ref 34–46.6)
HDLC SERPL-MCNC: 87 MG/DL
HGB BLD-MCNC: 12.2 G/DL (ref 11.1–15.9)
INTERPRETATION, 910389: NORMAL
LDLC SERPL CALC-MCNC: 117 MG/DL (ref 0–99)
MCH RBC QN AUTO: 29.4 PG (ref 26.6–33)
MCHC RBC AUTO-ENTMCNC: 31.8 G/DL (ref 31.5–35.7)
MCV RBC AUTO: 93 FL (ref 79–97)
PLATELET # BLD AUTO: 262 X10E3/UL (ref 150–379)
POTASSIUM SERPL-SCNC: 4.7 MMOL/L (ref 3.5–5.2)
PROT SERPL-MCNC: 6.8 G/DL (ref 6–8.5)
RBC # BLD AUTO: 4.15 X10E6/UL (ref 3.77–5.28)
SODIUM SERPL-SCNC: 141 MMOL/L (ref 134–144)
TRIGL SERPL-MCNC: 94 MG/DL (ref 0–149)
VLDLC SERPL CALC-MCNC: 19 MG/DL (ref 5–40)
WBC # BLD AUTO: 5.8 X10E3/UL (ref 3.4–10.8)

## 2017-07-12 NOTE — PROGRESS NOTES
This is an Initial Medicare Annual Wellness Visit providing Personalized Prevention Plan Services (PPPS)   I have reviewed the patient's medical history in detail and updated the computerized patient record. History     Past Medical History:   Diagnosis Date    Agatston coronary artery calcium score less than 100 2015    zero    Angiomyolipoma of right kidney     3.3 cm upper pole 11/8/15.  AR (allergic rhinitis)     Colon polyp 2009    Tubular adenoma. repeat 3/2013, 3/2014 2 polyps    DDD (degenerative disc disease), lumbar     Dr. Devan Jordan. L3-L4, L4-L5 injections . radiates to left knee. MRI 10/2015 mild DJD, no clear stenosis    GERD (gastroesophageal reflux disease)     Gluten intolerance     Hyperlipidemia 2009    Osteopenia 2014    mild 14 T-1.6 L fem neck    Raynaud phenomenon     SCC (squamous cell carcinoma)     right forearm. Dr. Salo Benz       Past Surgical History:   Procedure Laterality Date    HX BREAST REDUCTION      HX  SECTION      2    HX COLONOSCOPY  2014    2 small polyps. Dr Mehran Michele HX GI      perianal cyst    HX HEENT      face lift    HX KNEE ARTHROSCOPY      L knee - Zaslov    HX KNEE REPLACEMENT Left 2012    HX LUMBAR LAMINECTOMY  2017    L1-L2 Dr Krystin Monique    HX ORTHOPAEDIC      L shoulder     HX ORTHOPAEDIC      L ankle surgery    HX FAN AND BSO      HX TONSILLECTOMY      VASCULAR SURGERY PROCEDURE UNLIST      venous stripping R       Current Outpatient Prescriptions   Medication Sig    pravastatin (PRAVACHOL) 20 mg tablet TAKE 1 TABLET BY MOUTH NIGHTLY    cyclobenzaprine (FLEXERIL) 5 mg tablet Take 1 Tab by mouth three (3) times daily as needed for Muscle Spasm(s).     celecoxib (CELEBREX) 200 mg capsule TAKE ONE CAPSULE BY MOUTH EVERY DAY    estradiol (ESTRACE) 0.01 % (0.1 mg/gram) vaginal cream USE 1 GRAM VAGINALLY 3 TIMES WEEKLY    cholecalciferol, vitamin D3, (VITAMIN D3) 2,000 unit tab Take  by mouth.  DOCOSAHEXANOIC ACID/EPA (FISH OIL PO) Take  by mouth.  coenzyme q10 (CO Q-10) 10 mg cap Take  by mouth.  ASCORBATE CALCIUM (VITAMIN C PO) Take  by mouth.  esomeprazole (NEXIUM) 40 mg capsule Take 1 capsule by mouth daily.  fluticasone (FLONASE) 50 mcg/actuation nasal spray USE 2 SPRAYS IN EACH NOSTRIL DAILY AS NEEDED FOR ALLERGIES    loratadine (CLARITIN) 10 mg tablet Take 10 mg by mouth daily.  MULTIVITAMIN (MULTIPLE VITAMIN PO) Take  by mouth. No current facility-administered medications for this visit. Allergies   Allergen Reactions    Alfentanil Shortness of Breath    Gluten Other (comments)     Bloating, nausea, patient states that this can be removed from allergy list    Levaquin [Levofloxacin] Nausea Only    Morphine Hives, Rash and Itching    Percocet [Oxycodone-Acetaminophen] Itching    Tramadol Nausea Only       Family History   Problem Relation Age of Onset    Heart Failure Mother     Hypertension Mother     Stroke Mother     High Cholesterol Mother     Thyroid Disease Mother     Rashes/Skin Problems Mother     Cancer Mother      uterine    Heart Disease Mother      premature    Heart Attack Mother     Hypertension Father     Heart Failure Father     High Cholesterol Father     Dementia Father     Heart Disease Father     Stroke Father     Other Sister      deep venous thromboses and Pulmonary embolus    Cancer Paternal Aunt      colon    Diabetes Paternal Grandmother     Cancer Other      colon        reports that she has never smoked. She has never used smokeless tobacco.   reports that she drinks about 1.0 oz of alcohol per week       Depression Risk Factor Screening:       Alcohol Risk Factor Screening: On any occasion during the past 3 months, have you had more than 3 drinks containing alcohol? No    Do you average more than 14 drinks per week?   No      Functional Ability and Level of Safety:     Hearing Loss mild    Activities of Daily Living   Self-care. Requires assistance with: no ADLs    Fall Risk     Fall Risk Assessment, last 12 mths 2/13/2017   Able to walk? Yes   Fall in past 12 months? No         Abuse Screen   Patient is not abused    Review of Systems   A comprehensive review of systems was negative except for that written in the HPI. Physical Examination     Evaluation of Cognitive Function:  Mood/affect:  neutral, happy  Appearance: age appropriate  Family member/caregiver input: none    Blood pressure 115/75, pulse 79, temperature 98 °F (36.7 °C), resp. rate 12, height 5' 3\" (1.6 m), weight 156 lb (70.8 kg), last menstrual period 01/01/2000. General appearance: alert, cooperative, no distress, appears stated age  Neck: supple, symmetrical, trachea midline, no adenopathy, thyroid: not enlarged, symmetric, no tenderness/mass/nodules, no carotid bruit and no JVD  Lungs: clear to auscultation bilaterally  Heart: regular rate and rhythm, S1, S2 normal, no murmur, click, rub or gallop  Extremities: extremities normal, atraumatic, no cyanosis or edema    Patient Care Team:  Nati Almodovar MD as PCP - General (Internal Medicine)  Ralf Gao, RN as Ambulatory Care Navigator      Advice/Referrals/Counseling   Education and counseling provided. See below for specific orders    Assessment/Plan   Barbara Freed was seen today for cholesterol problem and annual wellness visit. Diagnoses and all orders for this visit:    Initial Medicare annual wellness visit  Preventative health care  -     METABOLIC PANEL, COMPREHENSIVE  -     CBC W/O DIFF    Asymptomatic menopausal state- repeat DEXA due  Hx of Osteopenia  -     DEXA BONE DENSITY STUDY AXIAL; Future    Encounter for immunization  -     Pneumococcal Polysaccharide vaccine, 23-Valent, Adult or Immunocompromised  -     ADMIN PNEUMOCOCCAL VACCINE  Medicare Injection Admin Charge    Colicky RLQ abdominal pain - for a few months. Colonoscopy pending.   Xray does not show clear obstipation, but there is stool throughout the colon. Try miralax daily. Hx FAN-BSO. Consider CT.  -     XR ABD (KUB); Future    DDD (degenerative disc disease), lumbar    Polyp of colon, unspecified part of colon, unspecified type  Colonoscopy pending    Hyperlipidemia, unspecified hyperlipidemia type- Controlled on current regimen. Continue current medications as written in chart.  -     LIPID PANEL    Vitamin D insufficiency  -     VITAMIN D, 25 HYDROXY    . Potential medication side effects were discussed with the patient; let me know if any occur.   Return for yearly Annual Wellness Visits

## 2017-09-19 ENCOUNTER — HOSPITAL ENCOUNTER (OUTPATIENT)
Dept: CT IMAGING | Age: 67
Discharge: HOME | End: 2017-09-19
Attending: ORTHOPAEDIC SURGERY
Payer: MEDICARE

## 2017-09-19 DIAGNOSIS — Z96.652 PAIN DUE TO TOTAL LEFT KNEE REPLACEMENT (HCC): ICD-10-CM

## 2017-09-19 DIAGNOSIS — T84.84XA PAIN DUE TO TOTAL LEFT KNEE REPLACEMENT (HCC): ICD-10-CM

## 2017-09-19 DIAGNOSIS — Z96.652 HISTORY OF ARTHROPLASTY OF LEFT KNEE: ICD-10-CM

## 2017-09-19 PROCEDURE — 73700 CT LOWER EXTREMITY W/O DYE: CPT

## 2017-10-16 ENCOUNTER — HOSPITAL ENCOUNTER (OUTPATIENT)
Dept: MRI IMAGING | Age: 67
Discharge: HOME | End: 2017-10-16
Attending: ORTHOPAEDIC SURGERY
Payer: MEDICARE

## 2017-10-16 DIAGNOSIS — S86.312A TRAUMATIC RUPTURE OF PERONEAL TENDON, LEFT, INITIAL ENCOUNTER: ICD-10-CM

## 2017-10-16 PROCEDURE — 73721 MRI JNT OF LWR EXTRE W/O DYE: CPT

## 2017-10-20 ENCOUNTER — HOSPITAL ENCOUNTER (OUTPATIENT)
Dept: MRI IMAGING | Age: 67
Discharge: HOME | End: 2017-10-20
Attending: ORTHOPAEDIC SURGERY
Payer: MEDICARE

## 2017-10-20 DIAGNOSIS — S86.312A TEAR OF PERONEAL TENDON, LEFT, INITIAL ENCOUNTER: ICD-10-CM

## 2017-10-20 PROCEDURE — 73718 MRI LOWER EXTREMITY W/O DYE: CPT

## 2018-01-07 RX ORDER — PRAVASTATIN SODIUM 20 MG/1
TABLET ORAL
Qty: 30 TAB | Refills: 5 | Status: SHIPPED | OUTPATIENT
Start: 2018-01-07 | End: 2018-09-02 | Stop reason: SDUPTHER

## 2018-02-01 RX ORDER — PREDNISONE 20 MG/1
40 TABLET ORAL DAILY
Qty: 10 TAB | Refills: 0 | Status: SHIPPED | OUTPATIENT
Start: 2018-02-01 | End: 2018-02-06

## 2018-02-01 RX ORDER — CEFDINIR 300 MG/1
300 CAPSULE ORAL 2 TIMES DAILY
Qty: 20 CAP | Refills: 0 | Status: SHIPPED | OUTPATIENT
Start: 2018-02-01 | End: 2019-08-06 | Stop reason: SDUPTHER

## 2018-03-12 ENCOUNTER — TELEPHONE (OUTPATIENT)
Dept: INTERNAL MEDICINE CLINIC | Age: 68
End: 2018-03-12

## 2018-03-12 NOTE — TELEPHONE ENCOUNTER
[facility] is requesting a refill on   Bal-est compounding cream for pt. The last time this was filled was 11/11/16.

## 2018-06-21 ENCOUNTER — TELEPHONE (OUTPATIENT)
Dept: INTERNAL MEDICINE CLINIC | Age: 68
End: 2018-06-21

## 2018-06-21 NOTE — TELEPHONE ENCOUNTER
----- Message from Eloisa Wilson sent at 6/21/2018  9:03 AM EDT -----  Regarding: Dr. Bee John P/[phone]    Pt would like a call back with the name and phone # to schedule her Bone Density.

## 2018-07-16 RX ORDER — CELECOXIB 200 MG/1
CAPSULE ORAL
Qty: 60 CAP | Refills: 5 | Status: SHIPPED | OUTPATIENT
Start: 2018-07-16 | End: 2019-07-08 | Stop reason: ALTCHOICE

## 2018-09-24 ENCOUNTER — OFFICE VISIT (OUTPATIENT)
Dept: INTERNAL MEDICINE CLINIC | Age: 68
End: 2018-09-24

## 2018-09-24 VITALS
TEMPERATURE: 97.5 F | WEIGHT: 152 LBS | HEIGHT: 63 IN | HEART RATE: 66 BPM | SYSTOLIC BLOOD PRESSURE: 124 MMHG | OXYGEN SATURATION: 98 % | DIASTOLIC BLOOD PRESSURE: 77 MMHG | BODY MASS INDEX: 26.93 KG/M2 | RESPIRATION RATE: 18 BRPM

## 2018-09-24 DIAGNOSIS — M62.830 SPASM OF LUMBAR PARASPINOUS MUSCLE: ICD-10-CM

## 2018-09-24 DIAGNOSIS — Z00.00 MEDICARE ANNUAL WELLNESS VISIT, SUBSEQUENT: Primary | ICD-10-CM

## 2018-09-24 DIAGNOSIS — R10.10 UPPER ABDOMINAL PAIN: ICD-10-CM

## 2018-09-24 DIAGNOSIS — E55.9 VITAMIN D DEFICIENCY: ICD-10-CM

## 2018-09-24 DIAGNOSIS — E78.5 HYPERLIPIDEMIA, UNSPECIFIED HYPERLIPIDEMIA TYPE: ICD-10-CM

## 2018-09-24 RX ORDER — CYCLOBENZAPRINE HCL 5 MG
5 TABLET ORAL
Qty: 50 TAB | Refills: 2 | Status: SHIPPED | OUTPATIENT
Start: 2018-09-24 | End: 2019-01-22

## 2018-09-24 NOTE — PATIENT INSTRUCTIONS

## 2018-09-24 NOTE — PROGRESS NOTES
This is a Subsequent Medicare Annual Wellness Visit providing Personalized Prevention Plan Services (PPPS) (Performed 12 months after initial AWV and PPPS ) I have reviewed the patient's medical history in detail and updated the computerized patient record. Reports ongoing spasms of upper abdomen. Worsening. Occurs at random, usually when leaning over or bending over. It can be severe at times. Stretches out and eventually improves. Saw GI and Esophagram normal, per Dr. Ricardo Fischer. Was told by Dr. 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 I dont have records of this US available to me today. No change with eating or when supine. No SOB. Hyperlipidemia Currently she takes pravastatin 20 mg 
ROS: taking medications as instructed, no medication side effects noted No new myalgias, no joint pains, no weakness No TIA's, no chest pain on exertion, no dyspnea on exertion, no swelling of ankles. Lab Results Component Value Date/Time Cholesterol, total 223 (H) 07/11/2017 10:03 AM  
 HDL Cholesterol 87 07/11/2017 10:03 AM  
 LDL, calculated 117 (H) 07/11/2017 10:03 AM  
 VLDL, calculated 19 07/11/2017 10:03 AM  
 Triglyceride 94 07/11/2017 10:03 AM  
 CHOL/HDL Ratio 2.5 08/06/2010 11:00 AM  
 
 
 
History Past Medical History:  
Diagnosis Date  Agatston coronary artery calcium score less than 100 11/2015  
 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  AR (allergic rhinitis)  Colon polyp 1/29/2009 Tubular adenoma. repeat 3/2013, 3/2014 2 polyps  DDD (degenerative disc disease), lumbar Dr. Eng Shoulders. L3-L4, L4-L5 injections 2013. radiates to left knee. MRI 10/2015 mild DJD, no clear stenosis  GERD (gastroesophageal reflux disease)  Gluten intolerance  Hyperlipidemia 1/8/2009  Osteopenia 4/2014 mild 14 T-1.6 L fem neck  Raynaud phenomenon  SCC (squamous cell carcinoma)   
 right forearm. Dr. Arabella Perez Past Surgical History:  
Procedure Laterality Date  HX BREAST REDUCTION    HX  SECTION    
 2  
 HX COLONOSCOPY  2014  
 2 small polyps. Dr Rg Sykes  HX GI    
 perianal cyst  
 HX HEENT    
 face lift  HX KNEE ARTHROSCOPY L knee - Zaslov  HX KNEE REPLACEMENT Left 2012  HX LUMBAR LAMINECTOMY  2017 L1-L2 Dr Vicki Berg  HX ORTHOPAEDIC    
 L shoulder  HX ORTHOPAEDIC    
 L ankle surgery  HX FAN AND BSO  HX TONSILLECTOMY  VASCULAR SURGERY PROCEDURE UNLIST    
 venous stripping R Current Outpatient Prescriptions Medication Sig  pravastatin (PRAVACHOL) 20 mg tablet TAKE 1 TABLET BY MOUTH NIGHTLY  celecoxib (CELEBREX) 200 mg capsule TAKE ONE CAPSULE BY MOUTH EVERY DAY  cyclobenzaprine (FLEXERIL) 5 mg tablet Take 1 Tab by mouth three (3) times daily as needed for Muscle Spasm(s).  estradiol (ESTRACE) 0.01 % (0.1 mg/gram) vaginal cream USE 1 GRAM VAGINALLY 3 TIMES WEEKLY  cholecalciferol, vitamin D3, (VITAMIN D3) 2,000 unit tab Take  by mouth.  DOCOSAHEXANOIC ACID/EPA (FISH OIL PO) Take  by mouth.  coenzyme q10 (CO Q-10) 10 mg cap Take  by mouth.  ASCORBATE CALCIUM (VITAMIN C PO) Take  by mouth.  esomeprazole (NEXIUM) 40 mg capsule Take 1 capsule by mouth daily.  fluticasone (FLONASE) 50 mcg/actuation nasal spray USE 2 SPRAYS IN EACH NOSTRIL DAILY AS NEEDED FOR ALLERGIES  loratadine (CLARITIN) 10 mg tablet Take 10 mg by mouth daily.  MULTIVITAMIN (MULTIPLE VITAMIN PO) Take  by mouth. No current facility-administered medications for this visit. Allergies Allergen Reactions  Alfentanil Shortness of Breath  Gluten Other (comments) Bloating, nausea, patient states that this can be removed from allergy list  
 Levaquin [Levofloxacin] Nausea Only  Morphine Hives, Rash and Itching  Percocet [Oxycodone-Acetaminophen] Itching  Tramadol Nausea Only Family History Problem Relation Age of Onset  Heart Failure Mother  Hypertension Mother  Stroke Mother  High Cholesterol Mother  Thyroid Disease Mother  Rashes/Skin Problems Mother  Cancer Mother   
  uterine  Heart Disease Mother   
  premature  Heart Attack Mother  Hypertension Father  Heart Failure Father  High Cholesterol Father  Dementia Father  Heart Disease Father  Stroke Father  Other Sister   
  deep venous thromboses and Pulmonary embolus  Cancer Paternal Aunt   
  colon  Diabetes Paternal Grandmother  Cancer Other   
  colon  
 
 
 reports that she has never smoked. She has never used smokeless tobacco. 
 reports that she drinks about 1.0 oz of alcohol per week Depression Risk Factor Screening:  
 
 
Alcohol Risk Factor Screening: On any occasion during the past 3 months, have you had more than 3 drinks containing alcohol? No 
 
Do you average more than 14 drinks per week? No 
 
 
Functional Ability and Level of Safety:  
 
Hearing Loss  
mild Activities of Daily Living Self-care. Requires assistance with: no ADLs Fall Risk Fall Risk Assessment, last 12 mths 9/24/2018 Able to walk? Yes Fall in past 12 months? No  
 
 
 
Abuse Screen Patient is not abused Review of Systems A comprehensive review of systems was negative except for that written in the HPI. Physical Examination Evaluation of Cognitive Function: 
Mood/affect:  neutral, happy Appearance: age appropriate Family member/caregiver input: none Blood pressure 124/77, pulse 66, temperature 97.5 °F (36.4 °C), resp. rate 18, height 5' 3\" (1.6 m), weight 152 lb (68.9 kg), last menstrual period 01/01/2000, SpO2 98 %. General appearance: alert, cooperative, no distress, appears stated age Neck: supple, symmetrical, trachea midline, no adenopathy, thyroid: not enlarged, symmetric, no tenderness/mass/nodules, no carotid bruit and no JVD Lungs: clear to auscultation bilaterally 
abd - mildly hyperactive bowel sounds, mild epigastric tenderness and some crepitus of bowels when compressing Heart: regular rate and rhythm, S1, S2 normal, no murmur, click, rub or gallop Extremities: extremities normal, atraumatic, no cyanosis or edema Patient Care Team: 
Manolo Yost MD as PCP - General (Internal Medicine) Jaleesa Dobson RN as Ambulatory Care Navigator Advice/Referrals/Counseling Education and counseling provided. See below for specific orders Assessment/Plan Diagnoses and all orders for this visit: 
 
1. Medicare annual wellness visit, subsequent Eye exam pending- she will schedule. Recommend HG flu next month, shingrix -     LIPID PANEL 
-     METABOLIC PANEL, COMPREHENSIVE 
-     CBC W/O DIFF 
-     VITAMIN D, 25 HYDROXY 2. Upper abdominal pain Recurrent relatively debilitating severe episodes of upper abdominal pain which are precipitated by leaning forward and moving. Mechanical pain. Sounds suspicious for a small hernia. May have evidence of a small epigastric hernia on my exam, possibly omental CT ordered. Refer to surgery after. Reassured it is unlikely she has a very dangerous hernia which would be incarcerated. -     CT ABD W CONT; Future 3. Spasm of lumbar paraspinous muscle 
-     cyclobenzaprine (FLEXERIL) 5 mg tablet; Take 1 Tab by mouth three (3) times daily as needed for Muscle Spasm(s). 4. Hyperlipidemia, unspecified hyperlipidemia type Controlled on current regimen. Continue current medications as written in chart 5. Vitamin D deficiency Controlled on current regimen. Continue current medications as written in chart. 
-     VITAMIN D, 25 HYDROXY Potential medication side effects were discussed with the patient; let me know if any occur. Return for yearly Annual Wellness Visits

## 2018-09-24 NOTE — MR AVS SNAPSHOT
[address] [facility], [place] 
 
 
 [address] [place] [address] [facility] 
[phone] Patient: Izzy Reynolds MRN: [id number] PCB:[birth year] Visit Information Date & Time Provider Department Dept. Phone Encounter #  
 9/24/2018  2:45 PM Dariel Paz MD [facility] [address] [id number] Upcoming Health Maintenance Date Due  
 GLAUCOMA SCREENING Q2Y 11/1/2016 Influenza Age 5 to Adult 10/31/2018* Shingrix Vaccine Age 50> (1 of 2) 10/31/2019* BREAST CANCER SCRN MAMMOGRAM 9/14/2019 MEDICARE YEARLY EXAM 9/25/2019 DTaP/Tdap/Td series (2 - Td) 5/2/2021 COLONOSCOPY 8/29/2023 *Topic was postponed. The date shown is not the original due date. Allergies as of 9/24/2018  Review Complete On: 9/24/2018 By: Dariel Paz MD  
  
 Severity Noted Reaction Type Reactions Alfentanil  01/08/2009    Shortness of Breath Gluten  01/08/2009    Other (comments) Bloating, nausea, patient states that this can be removed from allergy list  
 Levaquin [Levofloxacin]  08/16/2016    Nausea Only Morphine  01/08/2009    Hives, Rash, Itching Percocet [Oxycodone-acetaminophen]  01/08/2009    Itching Tramadol  08/20/2012   Side Effect Nausea Only Current Immunizations  Reviewed on 9/24/2018 Name Date Hepatitis A Vaccine 1/8/1994, 1/8/1994 Hepatitis B Vaccine 1/8/1994, 1/8/1994, 1/8/1994 Influenza High Dose Vaccine PF 11/2/2017, 2/13/2017 Pneumococcal Conjugate (PCV-13) 7/24/2015 Pneumococcal Polysaccharide (PPSV-23) 7/11/2017 TDAP Vaccine 5/2/2011 Zoster Vaccine, Live 8/21/2013 Reviewed by Dariel Paz MD on 9/24/2018 at  3:21 PM  
 Reviewed by Dariel Paz MD on 9/24/2018 at  3:21 PM  
You Were Diagnosed With   
  
 Codes Comments Medicare annual wellness visit, subsequent    -  Primary ICD-10-CM: Z00.00 ICD-9-CM: V70.0 Upper abdominal pain     ICD-10-CM: R10.10 ICD-9-CM: 789.09   
 Spasm of lumbar paraspinous muscle     ICD-10-CM: O65.292 ICD-9-CM: 724.8 Hyperlipidemia, unspecified hyperlipidemia type     ICD-10-CM: E78.5 ICD-9-CM: 272.4 Vitamin D deficiency     ICD-10-CM: E55.9 ICD-9-CM: 268.9 Vitals BP Pulse Temp Resp Height(growth percentile) Weight(growth percentile) 124/77 (BP 1 Location: Left arm, BP Patient Position: Sitting) 66 97.5 °F (36.4 °C) 18 5' 3\" (1.6 m) 152 lb (68.9 kg) LMP SpO2 BMI OB Status Smoking Status 01/01/2000 98% 26.93 kg/m2 Hysterectomy Never Smoker Vitals History BMI and BSA Data Body Mass Index Body Surface Area  
 26.93 kg/m 2 1.75 m 2 Preferred Pharmacy Pharmacy Name Phone [facility]/PHARMACY #[id number] - [place], [place] - [address] AT [address] [facility] [address] [phone] Your Updated Medication List  
  
   
This list is accurate as of 9/24/18  3:35 PM.  Always use your most recent med list.  
  
  
  
  
 celecoxib 200 mg capsule Commonly known as:  CELEBREX  
TAKE ONE CAPSULE BY MOUTH EVERY DAY  
  
 CLARITIN 10 mg tablet Generic drug:  loratadine Take 10 mg by mouth daily. CO Q-10 10 mg Cap Generic drug:  coenzyme q10 Take  by mouth. cyclobenzaprine 5 mg tablet Commonly known as:  FLEXERIL Take 1 Tab by mouth three (3) times daily as needed for Muscle Spasm(s). esomeprazole 40 mg capsule Commonly known as:  NexIUM Take 1 capsule by mouth daily. estradiol 0.01 % (0.1 mg/gram) vaginal cream  
Commonly known as:  ESTRACE  
USE 1 GRAM VAGINALLY 3 TIMES WEEKLY FISH OIL PO Take  by mouth. fluticasone 50 mcg/actuation nasal spray Commonly known as:  FLONASE  
USE 2 SPRAYS IN EACH NOSTRIL DAILY AS NEEDED FOR ALLERGIES MULTIPLE VITAMIN PO Take  by mouth.  
  
 pravastatin 20 mg tablet Commonly known as:  PRAVACHOL  
TAKE 1 TABLET BY MOUTH NIGHTLY  
  
 VITAMIN C PO Take  by mouth. VITAMIN D3 2,000 unit Tab Generic drug:  cholecalciferol (vitamin D3) Take  by mouth. Prescriptions Sent to Pharmacy Refills  
 cyclobenzaprine (FLEXERIL) 5 mg tablet 2 Sig: Take 1 Tab by mouth three (3) times daily as needed for Muscle Spasm(s). Class: Normal  
 Pharmacy: [facility]/pharmacy #[id number] - [place], [place] - [address] AT [address] Ph #: [phone] Route: Oral  
  
We Performed the Following CBC W/O DIFF [76306 CPT(R)] LIPID PANEL [60018 CPT(R)] METABOLIC PANEL, COMPREHENSIVE [01195 CPT(R)] VITAMIN D, 25 HYDROXY [id number] CPT(R)] To-Do List   
 09/24/2018 Imaging:  CT ABD W CONT Patient Instructions Well Visit, Over 72: Care Instructions Your Care Instructions Physical exams can help you stay healthy. Your doctor has checked your overall health and may have suggested ways to take good care of yourself. He or she also may have recommended tests. At home, you can help prevent illness with healthy eating, regular exercise, and other steps. Follow-up care is a key part of your treatment and safety. Be sure to make and go to all appointments, and call your doctor if you are having problems. It's also a good idea to know your test results and keep a list of the medicines you take. How can you care for yourself at home? · Reach and stay at a healthy weight. This will lower your risk for many problems, such as obesity, diabetes, heart disease, and high blood pressure. · Get at least 30 minutes of exercise on most days of the week. Walking is a good choice. You also may want to do other activities, such as running, swimming, cycling, or playing tennis or team sports. · Do not smoke. Smoking can make health problems worse. If you need help quitting, talk to your doctor about stop-smoking programs and medicines. These can increase your chances of quitting for good. · Protect your skin from too much sun. When you're outdoors from 10 a.m. to 4 p.m., stay in the shade or cover up with clothing and a hat with a wide brim. Wear sunglasses that block UV rays. Even when it's cloudy, put broad-spectrum sunscreen (SPF 30 or higher) on any exposed skin. · See a dentist one or two times a year for checkups and to have your teeth cleaned. · Wear a seat belt in the car. · Limit alcohol to 2 drinks a day for men and 1 drink a day for women. Too much alcohol can cause health problems. Follow your doctor's advice about when to have certain tests. These tests can spot problems early. For men and women · Cholesterol. Your doctor will tell you how often to have this done based on your overall health and other things that can increase your risk for heart attack and stroke. · Blood pressure. Have your blood pressure checked during a routine doctor visit. Your doctor will tell you how often to check your blood pressure based on your age, your blood pressure results, and other factors. · Diabetes. Ask your doctor whether you should have tests for diabetes. · Vision. Experts recommend that you have yearly exams for glaucoma and other age-related eye problems. · Hearing. Tell your doctor if you notice any change in your hearing. You can have tests to find out how well you hear. · Colon cancer tests. Keep having colon cancer tests as your doctor recommends. You can have one of several types of tests. · Heart attack and stroke risk. At least every 4 to 6 years, you should have your risk for heart attack and stroke assessed. Your doctor uses factors such as your age, blood pressure, cholesterol, and whether you smoke or have diabetes to show what your risk for a heart attack or stroke is over the next 10 years. · Osteoporosis. Talk to your doctor about whether you should have a bone density test to find out whether you have thinning bones. Also ask your doctor about whether you should take calcium and vitamin D supplements. For women · Pap test and pelvic exam. You may no longer need a Pap test. Talk with your doctor about whether to stop or continue to have Pap tests. · Breast exam and mammogram. Ask how often you should have a mammogram, which is an X-ray of your breasts. A mammogram can spot breast cancer before it can be felt and when it is easiest to treat. · Thyroid disease. Talk to your doctor about whether to have your thyroid checked as part of a regular physical exam. Women have an increased chance of a thyroid problem. For men · Prostate exam. Talk to your doctor about whether you should have a blood test (called a PSA test) for prostate cancer. Experts disagree on whether men should have this test. Some experts recommend that you discuss the benefits and risks of the test with your doctor. · Abdominal aortic aneurysm. Ask your doctor whether you should have a test to check for an aneurysm. You may need a test if you ever smoked or if your parent, brother, sister, or child has had an aneurysm. When should you call for help? Watch closely for changes in your health, and be sure to contact your doctor if you have any problems or symptoms that concern you. Where can you learn more? Go to http://catrachito-chetan.info/. Enter Y274 in the search box to learn more about \"Well Visit, Over 65: Care Instructions. \" Current as of: May 16, 2017 Content Version: 11.7 © 5620-6240 [employer]. Care instructions adapted under license by [employer] (which disclaims liability or warranty for this information). If you have questions about a medical condition or this instruction, always ask your healthcare professional. [place] [address] any warranty or liability for your use of this information. Introducing [facility]! Dear Romero Barrett: Thank you for requesting a [employer] account. Our records indicate that you already have an active [employer] account.   You can access your account anytime at [email or web address]. [employer]/[email or web address] Did you know that you can access your hospital and ER discharge instructions at any time in Mapidy? You can also review all of your test results from your hospital stay or ER visit. Additional Information If you have questions, please visit the Frequently Asked Questions section of the [employer] website at [email or web address] Remember, Mapidy is NOT to be used for urgent needs. For medical emergencies, dial 911. Now available from your iPhone and Android! Please provide this summary of care documentation to your next provider. Your primary care clinician is listed as Esther Sharpe. If you have any questions after today's visit, please call [phone].

## 2018-10-01 ENCOUNTER — HOSPITAL ENCOUNTER (OUTPATIENT)
Dept: CT IMAGING | Age: 68
Discharge: HOME | End: 2018-10-01
Attending: INTERNAL MEDICINE
Payer: MEDICARE

## 2018-10-01 DIAGNOSIS — R10.10 UPPER ABDOMINAL PAIN: ICD-10-CM

## 2018-10-01 LAB — CREAT BLD-MCNC: 0.7 MG/DL (ref 0.6–1.3)

## 2018-10-01 PROCEDURE — 82565 ASSAY OF CREATININE: CPT

## 2018-10-01 PROCEDURE — 74011636320 HC RX REV CODE- 636/320: Performed by: RADIOLOGY

## 2018-10-01 PROCEDURE — 74177 CT ABD & PELVIS W/CONTRAST: CPT

## 2018-10-01 RX ADMIN — IOPAMIDOL 100 ML: 755 INJECTION, SOLUTION INTRAVENOUS at 16:52

## 2018-10-23 ENCOUNTER — OFFICE VISIT (OUTPATIENT)
Dept: SURGERY | Age: 68
End: 2018-10-23

## 2018-10-23 VITALS
OXYGEN SATURATION: 98 % | SYSTOLIC BLOOD PRESSURE: 140 MMHG | TEMPERATURE: 98.3 F | RESPIRATION RATE: 20 BRPM | DIASTOLIC BLOOD PRESSURE: 80 MMHG | HEART RATE: 92 BPM | HEIGHT: 63 IN | BODY MASS INDEX: 26.84 KG/M2 | WEIGHT: 151.5 LBS

## 2018-10-23 DIAGNOSIS — K42.9 UMBILICAL HERNIA WITHOUT OBSTRUCTION AND WITHOUT GANGRENE: ICD-10-CM

## 2018-10-23 DIAGNOSIS — R10.13 EPIGASTRIC PAIN: Primary | ICD-10-CM

## 2018-10-23 NOTE — PROGRESS NOTES
1. Have you been to the ER, urgent care clinic since your last visit? Hospitalized since your last visit? new patient    2. Have you seen or consulted any other health care providers outside of the [address] [place] [address] since your last visit? Include any pap smears or colon screening.  New patient

## 2018-10-25 PROBLEM — K42.9 UMBILICAL HERNIA WITHOUT OBSTRUCTION AND WITHOUT GANGRENE: Status: ACTIVE | Noted: 2018-10-25

## 2018-10-25 PROBLEM — R10.13 EPIGASTRIC PAIN: Status: ACTIVE | Noted: 2018-10-25

## 2018-10-25 PROBLEM — N28.89 LEFT RENAL MASS: Status: ACTIVE | Noted: 2018-10-25

## 2018-10-25 NOTE — PATIENT INSTRUCTIONS
Abdominal Hernia Repair: Before Your Surgery  What is abdominal hernia repair surgery? An abdominal hernia repair is a type of surgery. It fixes a problem called a hernia. A hernia is a bulge under the skin in your belly. It happens when you have a weak spot in your belly muscles and a piece of your intestines or tissues pokes through your muscles. This can cause pain. You may notice the pain most when you lift something heavy. You can have a hernia near your belly button. Or it may be in a scar from an earlier surgery. To fix it, the doctor will do one of two kinds of surgery. In open surgery, the doctor makes one cut near the hernia. This cut is called an incision. In laparoscopic surgery, the doctor makes several very small incisions and uses a thin, lighted scope and small tools. In either type of surgery, the doctor pushes the bulge back in place, if needed. Then the doctor sews the healthy tissue back together. Often the doctor patches the weak spot with a piece of material.  Laparoscopic surgery leaves several small scars. Open surgery leaves one long scar. The scars fade with time. You will probably need to take 1 to 2 weeks off from work. But if your job requires heavy lifting or other physical work, you may need to take 4 to 6 weeks off. Follow-up care is a key part of your treatment and safety. Be sure to make and go to all appointments, and call your doctor if you are having problems. It's also a good idea to know your test results and keep a list of the medicines you take. What happens before surgery?   Surgery can be stressful. This information will help you understand what you can expect. And it will help you safely prepare for surgery.   Preparing for surgery    · Understand exactly what surgery is planned, along with the risks, benefits, and other options. · Tell your doctors ALL the medicines, vitamins, supplements, and herbal remedies you take.  Some of these can increase the risk of bleeding or interact with anesthesia.     · If you take blood thinners, such as warfarin (Coumadin), clopidogrel (Plavix), or aspirin, be sure to talk to your doctor. He or she will tell you if you should stop taking these medicines before your surgery. Make sure that you understand exactly what your doctor wants you to do.     · Your doctor will tell you which medicines to take or stop before your surgery. You may need to stop taking certain medicines a week or more before surgery. So talk to your doctor as soon as you can.     · If you have an advance directive, let your doctor know. It may include a living will and a durable power of  for health care. Bring a copy to the hospital. If you don't have one, you may want to prepare one. It lets your doctor and loved ones know your health care wishes. Doctors advise that everyone prepare these papers before any type of surgery or procedure. What happens on the day of surgery? · Follow the instructions exactly about when to stop eating and drinking. If you don't, your surgery may be canceled. If your doctor told you to take your medicines on the day of surgery, take them with only a sip of water.     · Take a bath or shower before you come in for your surgery. Do not apply lotions, perfumes, deodorants, or nail polish.     · Do not shave the surgical site yourself.     · Take off all jewelry and piercings. And take out contact lenses, if you wear them.    At the hospital or surgery center   · Bring a picture ID.     · The area for surgery is often marked to make sure there are no errors.     · You will be kept comfortable and safe by your anesthesia provider. You will be asleep during the surgery.     · The surgery will take 30 minutes to 2 hours. It depends on how large the hernia is and where it is. Going home   · Be sure you have someone to drive you home.  Anesthesia and pain medicine make it unsafe for you to drive.     · You will be given more specific instructions about recovering from your surgery. They will cover things like diet, wound care, follow-up care, driving, and getting back to your normal routine. When should you call your doctor? · You have questions or concerns.     · You don't understand how to prepare for your surgery.     · You become ill before the surgery (such as fever, flu, or a cold).     · You need to reschedule or have changed your mind about having the surgery. Where can you learn more? Go to http://catrachito-chetan.info/. Enter U288 in the search box to learn more about \"Abdominal Hernia Repair: Before Your Surgery. \"  Current as of: March 28, 2018  Content Version: 11.8  © 9716-7793 [employer], Incorporated. Care instructions adapted under license by [employer] (which disclaims liability or warranty for this information). If you have questions about a medical condition or this instruction, always ask your healthcare professional. [address] any warranty or liability for your use of this information.

## 2018-10-25 NOTE — PROGRESS NOTES
Surgery Consult    Subjective:      Corry Cortez is a 70 y.o. female who is being seen for evaluation of abdominal pain. The pain is located in the epigastric region without radiation. Pain is described as sharp and stabbing and measures 8/10 in intensity. Onset of pain was several years ago with gradual increase in frequency of attacks and intensity. Aggravating factors include certain positions; no relationship with oral intake. Alleviating factors include rubbing her epigastrium. She denies nausea, vomiting, chest pain, MOSS, or wheezing. She notes stable GERD symptoms without dysphagia. Patient Active Problem List    Diagnosis Date Noted    Epigastric pain 72/72/4173    Umbilical hernia without obstruction and without gangrene 10/25/2018    Left renal mass 10/25/2018    Agatston coronary artery calcium score less than 100 11/01/2015    DDD (degenerative disc disease), lumbar     SCC (squamous cell carcinoma) 01/01/2014    Gluten intolerance 01/18/2012    Colon polyp 01/29/2009    Osteopenia 01/08/2009    Raynaud phenomenon 01/08/2009    GERD (gastroesophageal reflux disease) 01/08/2009    AR (allergic rhinitis) 01/08/2009    Hyperlipidemia 01/08/2009     Past Medical History:   Diagnosis Date    Agatston coronary artery calcium score less than 100 11/2015    zero    Angiomyolipoma of right kidney     3.3 cm upper pole 11/8/15.  AR (allergic rhinitis)     Colon polyp 1/29/2009    Tubular adenoma. repeat 3/2013, 3/2014 2 polyps    DDD (degenerative disc disease), lumbar     Dr. Axel Couletr. L3-L4, L4-L5 injections 2013. radiates to left knee. MRI 10/2015 mild DJD, no clear stenosis    GERD (gastroesophageal reflux disease)     Gluten intolerance     Hyperlipidemia 1/8/2009    Osteopenia 4/2014    mild 4/20/14 T-1.6 L fem neck    Raynaud phenomenon     SCC (squamous cell carcinoma) 2014    right forearm.  Dr. Olivia Posada      Past Surgical History:   Procedure Laterality Date    HX BREAST REDUCTION  2009    HX  SECTION      2    HX COLONOSCOPY  2014    2 small polyps. Dr Puente HX GI      perianal cyst    HX HEENT      face lift    HX KNEE ARTHROSCOPY      L knee - Zaslov    HX KNEE REPLACEMENT Left 2012    HX LUMBAR LAMINECTOMY  2017    L1-L2 Dr Capo Lino    HX ORTHOPAEDIC      L shoulder     HX ORTHOPAEDIC      L ankle surgery    HX FAN AND BSO      HX TONSILLECTOMY      VASCULAR SURGERY PROCEDURE UNLIST      venous stripping R      Social History     Tobacco Use    Smoking status: Never Smoker    Smokeless tobacco: Never Used   Substance Use Topics    Alcohol use: Yes     Alcohol/week: 1.0 oz     Types: 2 Glasses of wine per week      Family History   Problem Relation Age of Onset    Heart Failure Mother     Hypertension Mother     Stroke Mother     High Cholesterol Mother     Thyroid Disease Mother     Rashes/Skin Problems Mother     Cancer Mother         uterine    Heart Disease Mother         premature    Heart Attack Mother     Hypertension Father     Heart Failure Father     High Cholesterol Father     Dementia Father     Heart Disease Father     Stroke Father     Other Sister         deep venous thromboses and Pulmonary embolus    Cancer Paternal Aunt         colon    Diabetes Paternal Grandmother     Cancer Other         colon      Current Outpatient Medications   Medication Sig    cyclobenzaprine (FLEXERIL) 5 mg tablet Take 1 Tab by mouth three (3) times daily as needed for Muscle Spasm(s).  pravastatin (PRAVACHOL) 20 mg tablet TAKE 1 TABLET BY MOUTH NIGHTLY    celecoxib (CELEBREX) 200 mg capsule TAKE ONE CAPSULE BY MOUTH EVERY DAY    estradiol (ESTRACE) 0.01 % (0.1 mg/gram) vaginal cream USE 1 GRAM VAGINALLY 3 TIMES WEEKLY    cholecalciferol, vitamin D3, (VITAMIN D3) 2,000 unit tab Take  by mouth.  DOCOSAHEXANOIC ACID/EPA (FISH OIL PO) Take  by mouth.  coenzyme q10 (CO Q-10) 10 mg cap Take  by mouth.     ASCORBATE CALCIUM (VITAMIN C PO) Take  by mouth.  esomeprazole (NEXIUM) 40 mg capsule Take 1 capsule by mouth daily.  fluticasone (FLONASE) 50 mcg/actuation nasal spray USE 2 SPRAYS IN EACH NOSTRIL DAILY AS NEEDED FOR ALLERGIES    loratadine (CLARITIN) 10 mg tablet Take 10 mg by mouth daily.  MULTIVITAMIN (MULTIPLE VITAMIN PO) Take  by mouth. No current facility-administered medications for this visit. Allergies   Allergen Reactions    Alfentanil Shortness of Breath    Gluten Other (comments)     Bloating, nausea, patient states that this can be removed from allergy list    Levaquin [Levofloxacin] Nausea Only    Morphine Hives, Rash and Itching    Percocet [Oxycodone-Acetaminophen] Itching    Tramadol Nausea Only       Review of Systems:    A complete review of systems was negative except as noted in the HPI. Objective:        Visit Vitals  /80   Pulse 92   Temp 98.3 °F (36.8 °C)   Resp 20   Ht 5' 3\" (1.6 m)   Wt 151 lb 8 oz (68.7 kg)   LMP 01/01/2000   SpO2 98%   BMI 26.84 kg/m²       Physical Exam:  GENERAL: alert, cooperative, no distress, appears stated age, EYE: negative, LYMPH NODES: Cervical, supraclavicular nodes normal. THROAT & NECK: normal, LUNG: clear to auscultation bilaterally, HEART: regular rate and rhythm, S1, S2 normal, no murmur. ABDOMEN: NABS, non-distended, soft. Mild epigastric pain with palpation; tender, reducible umbilical hernia. EXTREMITIES:  extremities normal, atraumatic, no cyanosis or edema, SKIN: Normal., NEUROLOGIC: negative    Imaging:  reviewed  CT    Assessment:     Abdominal pain, epigastric:imaging suggests left Bochdalek diapharagmatic hernia. Umbilical hernia, tender to palpation. Left renal mass. Plan:     1. I recommend proceeding with interval urology evaluation. 2. Will review [facility] CT scan with Radiology to assess if consistent with diaphragmatic hernia.     3. If partial nephrectomy warranted, will perform laparoscopy, possible diaphragmatic hernia repair with umbilical hernia repair; if partial nephrectomy not indicated at this time, offered laparoscopy, possible diaphragmatic hernia repair with umbilical hernia repair. She will contact us after Urology evaluation.     Signed By: Maryuri Thapa MD     October 25, 2018

## 2018-11-15 ENCOUNTER — TELEPHONE (OUTPATIENT)
Dept: INTERNAL MEDICINE CLINIC | Age: 68
End: 2018-11-15

## 2018-11-15 NOTE — TELEPHONE ENCOUNTER
----- Message from [employer] sent at 11/15/2018 12:13 PM EST -----  Regarding: Layla Roldan - MD / telephone  Pt would like the Dr to call in a prescription of steroids to [facility] (information is on file) Pts back has been hurting for a week. Pts contact [phone]. Pt states that she does not want an appt. [id number] [place], [place] - [address] [address]  [address] [phone]

## 2018-11-16 ENCOUNTER — APPOINTMENT (OUTPATIENT)
Dept: GENERAL RADIOLOGY | Age: 68
End: 2018-11-16
Attending: EMERGENCY MEDICINE
Payer: MEDICARE

## 2018-11-16 ENCOUNTER — HOSPITAL ENCOUNTER (EMERGENCY)
Age: 68
Discharge: HOME | End: 2018-11-16
Attending: EMERGENCY MEDICINE
Payer: MEDICARE

## 2018-11-16 ENCOUNTER — PATIENT MESSAGE (OUTPATIENT)
Dept: INTERNAL MEDICINE CLINIC | Age: 68
End: 2018-11-16

## 2018-11-16 VITALS
DIASTOLIC BLOOD PRESSURE: 74 MMHG | HEART RATE: 66 BPM | RESPIRATION RATE: 18 BRPM | BODY MASS INDEX: 26.22 KG/M2 | TEMPERATURE: 97.5 F | HEIGHT: 63 IN | WEIGHT: 148 LBS | OXYGEN SATURATION: 96 % | SYSTOLIC BLOOD PRESSURE: 130 MMHG

## 2018-11-16 DIAGNOSIS — M54.17 RADICULOPATHY OF LUMBOSACRAL REGION: Primary | ICD-10-CM

## 2018-11-16 PROCEDURE — 74011636637 HC RX REV CODE- 636/637: Performed by: EMERGENCY MEDICINE

## 2018-11-16 PROCEDURE — 72100 X-RAY EXAM L-S SPINE 2/3 VWS: CPT

## 2018-11-16 PROCEDURE — 99283 EMERGENCY DEPT VISIT LOW MDM: CPT

## 2018-11-16 PROCEDURE — A9270 NON-COVERED ITEM OR SERVICE: HCPCS | Performed by: EMERGENCY MEDICINE

## 2018-11-16 PROCEDURE — 74011250637 HC RX REV CODE- 250/637: Performed by: EMERGENCY MEDICINE

## 2018-11-16 RX ORDER — TRAMADOL HYDROCHLORIDE 50 MG/1
50 TABLET ORAL
Status: DISCONTINUED | OUTPATIENT
Start: 2018-11-16 | End: 2018-11-16

## 2018-11-16 RX ORDER — HYDROCODONE BITARTRATE AND ACETAMINOPHEN 5; 325 MG/1; MG/1
1 TABLET ORAL
Status: COMPLETED | OUTPATIENT
Start: 2018-11-16 | End: 2018-11-16

## 2018-11-16 RX ORDER — PREDNISONE 5 MG/1
TABLET ORAL
Qty: 21 TAB | Refills: 0 | Status: SHIPPED | OUTPATIENT
Start: 2018-11-16 | End: 2018-12-20

## 2018-11-16 RX ORDER — ACETAMINOPHEN 500 MG
1000 TABLET ORAL
COMMUNITY
End: 2020-12-17 | Stop reason: ALTCHOICE

## 2018-11-16 RX ORDER — PREDNISONE 20 MG/1
60 TABLET ORAL
Status: COMPLETED | OUTPATIENT
Start: 2018-11-16 | End: 2018-11-16

## 2018-11-16 RX ADMIN — HYDROCODONE BITARTRATE AND ACETAMINOPHEN 1 TABLET: 5; 325 TABLET ORAL at 12:32

## 2018-11-16 RX ADMIN — PREDNISONE 60 MG: 20 TABLET ORAL at 12:24

## 2018-11-16 NOTE — ED NOTES
AIDET communication provided and informed of purposeful rounding to include collaboration of entire care team; patient acknowledged understanding. Warm blanket for patient comfort.  remains at bedside. Call bell within reach, will continue to monitor.

## 2018-11-16 NOTE — ED TRIAGE NOTES
\"sacral back pain. \" pt states it started 2 weeks ago. Went to [email or web address] for shoulder, \"after that I was real sore in my back. Monday back pain has gotten worse. \" went to chiro today and they did US and TENS. \"pain is going into my sciatica. \" right side.  at bedside. Pt ambulated to room with a slow, steady gait. Called PCP, because he has called in steroids before which helped, but he wasn't in. Surgery L1, L2 last year. No numbness or tingling and no loss of control of bowel or bladder.

## 2018-11-16 NOTE — DISCHARGE INSTRUCTIONS
Sciatica: Care Instructions  Your Care Instructions    Sciatica (say \"htc-IU-sr-kuh\") is an irritation of one of the sciatic nerves, which come from the spinal cord in the lower back. The sciatic nerves and their branches extend down through the buttock to the foot. Sciatica can develop when an injured disc in the back presses against a spinal nerve root. Its main symptom is pain, numbness, or weakness that is often worse in the leg or foot than in the back. Sciatica often will improve and go away with time. Early treatment usually includes medicines and exercises to relieve pain. Follow-up care is a key part of your treatment and safety. Be sure to make and go to all appointments, and call your doctor if you are having problems. It's also a good idea to know your test results and keep a list of the medicines you take. How can you care for yourself at home? · Take pain medicines exactly as directed. ? If the doctor gave you a prescription medicine for pain, take it as prescribed. ? If you are not taking a prescription pain medicine, ask your doctor if you can take an over-the-counter medicine. · Use heat or ice to relieve pain. ? To apply heat, put a warm water bottle, heating pad set on low, or warm cloth on your back. Do not go to sleep with a heating pad on your skin. ? To use ice, put ice or a cold pack on the area for 10 to 20 minutes at a time. Put a thin cloth between the ice and your skin. · Avoid sitting if possible, unless it feels better than standing. · Alternate lying down with short walks. Increase your walking distance as you are able to without making your symptoms worse. · Do not do anything that makes your symptoms worse. When should you call for help? Call 911 anytime you think you may need emergency care.  For example, call if:    · You are unable to move a leg at all.   [facility] your doctor now or seek immediate medical care if:    · You have new or worse symptoms in your legs or buttocks. Symptoms may include:  ? Numbness or tingling. ? Weakness. ? Pain.     · You lose bladder or bowel control.    Watch closely for changes in your health, and be sure to contact your doctor if:    · You are not getting better as expected. Where can you learn more? Go to http://catrachito-chetan.info/. Enter [phone] in the search box to learn more about \"Sciatica: Care Instructions. \"  Current as of: November 29, 2017  Content Version: 11.8  © 8637-0104 [employer]. Care instructions adapted under license by [employer] (which disclaims liability or warranty for this information). If you have questions about a medical condition or this instruction, always ask your healthcare professional. [address] any warranty or liability for your use of this information.

## 2018-11-16 NOTE — ED NOTES
Pt states she can't take tramadol, \"it makes me nauseated. \" pt states she cannot take with zofran. Pt requesting hydrocodone. Informed Dr. Garry Frank and new orders received, see MAR.

## 2018-11-16 NOTE — ED NOTES
Patient discharged home after receiving discharge instructions from MD.  Patient and  voiced understanding and doesn't have any questions at this time. Patient in no distress at this time. Pt ambulated out of the ER and  is driving her home.

## 2018-11-16 NOTE — ED PROVIDER NOTES
'went to chiropractor 2 weeks ago to get my shoulder adjusted/ and she inadvertently did something to my R low back/ sacral area/ it has been worsening since then; this past Monday got worse, went back to the chiropractor today, they did a tens unit and US, but what I need is steroids/ but Dr gregorio is not in today so I came here'; pt denies trauma, HA, vison changes, diff swallowing, CP, SOB, Abd pain, F/Ch, N/V, D/Cons or other current systemic complaints Normal bladder/ bowel habits; The history is provided by the patient and the spouse. Back Pain This is a recurrent problem. The current episode started more than 1 week ago. The problem has been gradually worsening. The problem occurs constantly. Patient reports not work related injury. Associated with: 'chiropractic adjustment' The pain is present in the lumbar spine, gluteal region, right side and lower back. The quality of the pain is described as aching and shooting. The pain radiates to the right thigh. The pain is mild. The symptoms are aggravated by certain positions and twisting. Pertinent negatives include no chest pain, no fever, no numbness, no weight loss, no headaches, no abdominal swelling, no bowel incontinence, no perianal numbness, no bladder incontinence, no dysuria, no pelvic pain, no leg pain, no paresthesias, no paresis, no tingling and no weakness. Treatments tried: as above. The treatment provided no relief. Risk factors include a sedentary lifestyle and lack of exercise (prior spinal surgery). The patient's surgical history includes laminectomy and spinal fusion. Past Medical History:  
Diagnosis Date  Agatston coronary artery calcium score less than 100 11/2015  
 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  AR (allergic rhinitis)  Colon polyp 1/29/2009 Tubular adenoma. repeat 3/2013, 3/2014 2 polyps  DDD (degenerative disc disease), lumbar Dr. Axel Coulter. L3-L4, L4-L5 injections . radiates to left knee. MRI 10/2015 mild DJD, no clear stenosis  GERD (gastroesophageal reflux disease)  Gluten intolerance  Hyperlipidemia 2009  Osteopenia 2014  
 mild 14 T-1.6 L fem neck  Raynaud phenomenon  SCC (squamous cell carcinoma)   
 right forearm. Dr. Olivia Posada Past Surgical History:  
Procedure Laterality Date  HX BREAST REDUCTION    HX  SECTION    
 2  
 HX COLONOSCOPY  2014  
 2 small polyps. Dr Natalia Cuba  HX GI    
 perianal cyst  
 HX HEENT    
 face lift  HX KNEE ARTHROSCOPY L knee - Zaslov  HX KNEE REPLACEMENT Left 2012  HX LUMBAR LAMINECTOMY  2017 L1-L2 Dr Cathy Wilkerson  HX ORTHOPAEDIC    
 L shoulder, L1, L2 laminectomy  HX ORTHOPAEDIC    
 L ankle surgery  HX FAN AND BSO  HX TONSILLECTOMY  VASCULAR SURGERY PROCEDURE UNLIST    
 venous stripping R Family History:  
Problem Relation Age of Onset  Heart Failure Mother  Hypertension Mother  Stroke Mother  High Cholesterol Mother  Thyroid Disease Mother  Rashes/Skin Problems Mother  Cancer Mother   
     uterine  Heart Disease Mother   
     premature  Heart Attack Mother  Hypertension Father  Heart Failure Father  High Cholesterol Father  Dementia Father  Heart Disease Father  Stroke Father  Other Sister   
     deep venous thromboses and Pulmonary embolus  Cancer Paternal Aunt   
     colon  Diabetes Paternal Grandmother  Cancer Other   
     colon Social History Socioeconomic History  Marital status:  Spouse name: Not on file  Number of children: Not on file  Years of education: Not on file  Highest education level: Not on file Social Needs  Financial resource strain: Not on file  Food insecurity - worry: Not on file  Food insecurity - inability: Not on file  Transportation needs - medical: Not on file  Transportation needs - non-medical: Not on file Occupational History  Not on file Tobacco Use  Smoking status: Never Smoker  Smokeless tobacco: Never Used Substance and Sexual Activity  Alcohol use: Yes Alcohol/week: 1.0 oz Types: 2 Glasses of wine per week  Drug use: No  
 Sexual activity: No  
Other Topics Concern  Not on file Social History Narrative  Not on file ALLERGIES: Alfentanil; Gluten; Levaquin [levofloxacin]; Morphine; Percocet [oxycodone-acetaminophen]; and Tramadol Review of Systems Constitutional: Negative for fever and weight loss. Cardiovascular: Negative for chest pain. Gastrointestinal: Negative for bowel incontinence. Genitourinary: Negative for bladder incontinence, dysuria and pelvic pain. Musculoskeletal: Positive for back pain. Neurological: Negative for tingling, weakness, numbness, headaches and paresthesias. Vitals:  
 11/16/18 1203 11/16/18 1321 BP: 153/70 130/74 Pulse: 75 66 Resp: 18 18 Temp: 97.5 °F (36.4 °C) SpO2: 97% 96% Weight: 67.1 kg (148 lb) Height: 5' 3\" (1.6 m) Physical Exam  
Constitutional: She is oriented to person, place, and time. She appears well-developed and well-nourished. Uncomfortable appearing, AxOx4, speaking in complete sentences HENT:  
Head: Normocephalic and atraumatic. Right Ear: External ear normal.  
Left Ear: External ear normal.  
Nose: Nose normal.  
Eyes: Conjunctivae and EOM are normal. Pupils are equal, round, and reactive to light. Right eye exhibits no discharge. No scleral icterus. Neck: Normal range of motion. Neck supple. No JVD present. No tracheal deviation present. Cardiovascular: Normal rate, regular rhythm and normal heart sounds. Exam reveals no gallop and no friction rub. No murmur heard.  
Pulmonary/Chest: Effort normal and breath sounds normal. No respiratory distress. She has no wheezes. She has no rales. She exhibits no tenderness. Abdominal: Soft. Bowel sounds are normal. There is no tenderness. There is no rebound and no guarding. nttp Genitourinary: No vaginal discharge found. Genitourinary Comments: Pt denies urinary/ vaginal complaints 
 
no cauda equina sx Musculoskeletal: She exhibits tenderness. She exhibits no edema or deformity. Lumbar back: She exhibits decreased range of motion, tenderness, bony tenderness and pain. She exhibits no swelling, no edema, no deformity, no laceration, no spasm and normal pulse. Back: 
 
R paraspinal min ttp/ exam consistent w/ radicular pain/ pt has distal motor/ CV/ Sensation grossly intact bilat feet;   
Neurological: She is alert and oriented to person, place, and time. No cranial nerve deficit. She exhibits normal muscle tone. Coordination normal.  
Skin: Skin is warm and dry. No rash noted. No erythema. No pallor. Psychiatric: She has a normal mood and affect. Her behavior is normal. Thought content normal.  
Nursing note and vitals reviewed. MDM Procedures 1:25 PM 'feels abit better';  
Fernando Hull's  results have been reviewed with her. She has been counseled regarding her diagnosis. She verbally conveys understanding and agreement of the signs, symptoms, diagnosis, treatment and prognosis and additionally agrees to Call/ Arrange follow up as recommended with Dr. Alexi Hess MD in 24 - 48 hours. She also agrees with the care-plan and conveys that all of her questions have been answered. I have also put together some discharge instructions for her that include: 1) educational information regarding their diagnosis, 2) how to care for their diagnosis at home, as well a 3) list of reasons why they would want to return to the ED prior to their follow-up appointment, should their condition change or for concerns.

## 2018-11-16 NOTE — TELEPHONE ENCOUNTER
Patient is requesting to speak with the nurse , patient was informed she needed an apt for medication request but states she can not last the weekend without pain medication.   She can be reached at [phone]

## 2018-11-26 RX ORDER — PREDNISONE 20 MG/1
TABLET ORAL
Qty: 18 TAB | Refills: 0 | Status: SHIPPED | OUTPATIENT
Start: 2018-11-26 | End: 2018-12-05

## 2018-11-27 ENCOUNTER — OFFICE VISIT (OUTPATIENT)
Dept: SURGERY | Age: 68
End: 2018-11-27

## 2018-11-27 VITALS
TEMPERATURE: 97.9 F | WEIGHT: 153 LBS | HEART RATE: 78 BPM | HEIGHT: 63 IN | DIASTOLIC BLOOD PRESSURE: 80 MMHG | BODY MASS INDEX: 27.11 KG/M2 | OXYGEN SATURATION: 98 % | SYSTOLIC BLOOD PRESSURE: 110 MMHG | RESPIRATION RATE: 20 BRPM

## 2018-11-27 DIAGNOSIS — K44.9 DIAPHRAGMATIC HERNIA WITHOUT OBSTRUCTION AND WITHOUT GANGRENE: ICD-10-CM

## 2018-11-27 DIAGNOSIS — K42.9 UMBILICAL HERNIA WITHOUT OBSTRUCTION AND WITHOUT GANGRENE: ICD-10-CM

## 2018-11-27 DIAGNOSIS — K21.9 GASTROESOPHAGEAL REFLUX DISEASE WITHOUT ESOPHAGITIS: Primary | ICD-10-CM

## 2018-11-27 NOTE — PROGRESS NOTES
1. Have you been to the ER, urgent care clinic since your last visit? Hospitalized since your last visit? yes ED for back pain    2. Have you seen or consulted any other health care providers outside of the [address] [place] [address] since your last visit? Include any pap smears or colon screening.  Yes urologist

## 2018-11-27 NOTE — PATIENT INSTRUCTIONS

## 2018-11-28 NOTE — TELEPHONE ENCOUNTER
Reached out to patient to  assist with scheduling a medication evaluation appointment. Patient stated she will call the office back to schedule said appointment.  - thank you

## 2018-11-29 NOTE — PROGRESS NOTES
Subjective:      Carmine Spain is a 70 y.o. female presents for follow-up of possible diaphragmatic hernia. She continues to experience GERD symptoms and spasm-like substernal pain. Also still present are epigastric and chest pains when bending in certain positions. No hematemesis. Objective:     Visit Vitals  /80   Pulse 78   Temp 97.9 °F (36.6 °C)   Resp 20   Ht 5' 3\" (1.6 m)   Wt 153 lb (69.4 kg)   LMP 01/01/2000   SpO2 98%   BMI 27.10 kg/m²       General:  alert, cooperative, no distress, appears stated age   Abdomen: deferred   Incision:   deferred     Assessment:     Diaphragmatic hernia: Radiologist confirms Bochdalek hernia containing fat; does not appear to be part of her symptomatolgy. Umbilical hernia    GERD: She is interested in completing work-up. Plan:     1. Continue current medications. 2. Diet as tolerated. 3. She will obtain UGI from [facility] and contact Gastroenterologist to arrange manometry. 4. NM gastric emptying study.

## 2018-12-06 ENCOUNTER — HOSPITAL ENCOUNTER (OUTPATIENT)
Dept: NUCLEAR MEDICINE | Age: 68
Discharge: HOME | End: 2018-12-06
Attending: SURGERY
Payer: MEDICARE

## 2018-12-06 DIAGNOSIS — K21.9 GASTROESOPHAGEAL REFLUX DISEASE WITHOUT ESOPHAGITIS: ICD-10-CM

## 2018-12-06 PROCEDURE — 78264 GASTRIC EMPTYING IMG STUDY: CPT

## 2018-12-20 ENCOUNTER — OFFICE VISIT (OUTPATIENT)
Dept: SURGERY | Age: 68
End: 2018-12-20

## 2018-12-20 ENCOUNTER — HOSPITAL ENCOUNTER (EMERGENCY)
Age: 68
Discharge: HOME | End: 2018-12-20
Attending: EMERGENCY MEDICINE
Payer: MEDICARE

## 2018-12-20 VITALS
BODY MASS INDEX: 27.11 KG/M2 | HEIGHT: 63 IN | DIASTOLIC BLOOD PRESSURE: 90 MMHG | OXYGEN SATURATION: 98 % | HEART RATE: 98 BPM | TEMPERATURE: 98.3 F | WEIGHT: 153 LBS | RESPIRATION RATE: 18 BRPM | SYSTOLIC BLOOD PRESSURE: 162 MMHG

## 2018-12-20 VITALS
HEIGHT: 63 IN | SYSTOLIC BLOOD PRESSURE: 154 MMHG | OXYGEN SATURATION: 98 % | RESPIRATION RATE: 16 BRPM | WEIGHT: 149 LBS | DIASTOLIC BLOOD PRESSURE: 82 MMHG | HEART RATE: 97 BPM | TEMPERATURE: 98.4 F | BODY MASS INDEX: 26.4 KG/M2

## 2018-12-20 DIAGNOSIS — K21.9 GASTROESOPHAGEAL REFLUX DISEASE WITHOUT ESOPHAGITIS: Primary | ICD-10-CM

## 2018-12-20 DIAGNOSIS — R10.13 EPIGASTRIC PAIN: ICD-10-CM

## 2018-12-20 DIAGNOSIS — M79.89 LEFT LEG SWELLING: Primary | ICD-10-CM

## 2018-12-20 DIAGNOSIS — K42.9 UMBILICAL HERNIA WITHOUT OBSTRUCTION AND WITHOUT GANGRENE: ICD-10-CM

## 2018-12-20 LAB
ALBUMIN SERPL-MCNC: 4 G/DL (ref 3.5–5)
ALBUMIN/GLOB SERPL: 1.3 {RATIO} (ref 1.1–2.2)
ALP SERPL-CCNC: 40 U/L (ref 45–117)
ALT SERPL-CCNC: 30 U/L (ref 12–78)
ANION GAP SERPL CALC-SCNC: 5 MMOL/L (ref 5–15)
AST SERPL-CCNC: 17 U/L (ref 15–37)
BASOPHILS # BLD: 0 K/UL (ref 0–0.1)
BASOPHILS NFR BLD: 1 % (ref 0–1)
BILIRUB SERPL-MCNC: 0.2 MG/DL (ref 0.2–1)
BUN SERPL-MCNC: 15 MG/DL (ref 6–20)
BUN/CREAT SERPL: 21 (ref 12–20)
CALCIUM SERPL-MCNC: 8.7 MG/DL (ref 8.5–10.1)
CHLORIDE SERPL-SCNC: 110 MMOL/L (ref 97–108)
CO2 SERPL-SCNC: 28 MMOL/L (ref 21–32)
COMMENT, HOLDF: NORMAL
CREAT SERPL-MCNC: 0.71 MG/DL (ref 0.55–1.02)
DIFFERENTIAL METHOD BLD: ABNORMAL
EOSINOPHIL # BLD: 0 K/UL (ref 0–0.4)
EOSINOPHIL NFR BLD: 0 % (ref 0–7)
ERYTHROCYTE [DISTWIDTH] IN BLOOD BY AUTOMATED COUNT: 15.3 % (ref 11.5–14.5)
GLOBULIN SER CALC-MCNC: 3 G/DL (ref 2–4)
GLUCOSE SERPL-MCNC: 100 MG/DL (ref 65–100)
HCT VFR BLD AUTO: 38 % (ref 35–47)
HGB BLD-MCNC: 11.7 G/DL (ref 11.5–16)
IMM GRANULOCYTES # BLD: 0 K/UL (ref 0–0.04)
IMM GRANULOCYTES NFR BLD AUTO: 0 % (ref 0–0.5)
LYMPHOCYTES # BLD: 1.4 K/UL (ref 0.8–3.5)
LYMPHOCYTES NFR BLD: 32 % (ref 12–49)
MCH RBC QN AUTO: 29.9 PG (ref 26–34)
MCHC RBC AUTO-ENTMCNC: 30.8 G/DL (ref 30–36.5)
MCV RBC AUTO: 97.2 FL (ref 80–99)
MONOCYTES # BLD: 0.6 K/UL (ref 0–1)
MONOCYTES NFR BLD: 14 % (ref 5–13)
NEUTS SEG # BLD: 2.4 K/UL (ref 1.8–8)
NEUTS SEG NFR BLD: 54 % (ref 32–75)
NRBC # BLD: 0 K/UL (ref 0–0.01)
NRBC BLD-RTO: 0 PER 100 WBC
PLATELET # BLD AUTO: 214 K/UL (ref 150–400)
PMV BLD AUTO: 10.7 FL (ref 8.9–12.9)
POTASSIUM SERPL-SCNC: 3.7 MMOL/L (ref 3.5–5.1)
PROT SERPL-MCNC: 7 G/DL (ref 6.4–8.2)
RBC # BLD AUTO: 3.91 M/UL (ref 3.8–5.2)
SAMPLES BEING HELD,HOLD: NORMAL
SODIUM SERPL-SCNC: 143 MMOL/L (ref 136–145)
WBC # BLD AUTO: 4.4 K/UL (ref 3.6–11)

## 2018-12-20 PROCEDURE — 99282 EMERGENCY DEPT VISIT SF MDM: CPT

## 2018-12-20 PROCEDURE — 80053 COMPREHEN METABOLIC PANEL: CPT

## 2018-12-20 PROCEDURE — 93971 EXTREMITY STUDY: CPT

## 2018-12-20 PROCEDURE — 85025 COMPLETE CBC W/AUTO DIFF WBC: CPT

## 2018-12-20 PROCEDURE — 36415 COLL VENOUS BLD VENIPUNCTURE: CPT

## 2018-12-20 NOTE — PROCEDURES
[facility]  *** FINAL REPORT ***    Name: Tiffany Rivero  MRN: [id number]  : 03 Aug 1950  HIS Order #: [id number]  [id number] [facility] Visit #: [id number]  Date: 20 Dec 2018    TYPE OF TEST: Peripheral Venous Testing    REASON FOR TEST  Limb swelling    Left Leg:-  Deep venous thrombosis:           No  Superficial venous thrombosis:    No  Deep venous insufficiency:        Not examined  Superficial venous insufficiency: Not examined      INTERPRETATION/FINDINGS  PROCEDURE:  Color duplex ultrasound imaging of lower extremity veins. FINDINGS:       Right: The common femoral vein is patent and without evidence of  thrombus. Phasic flow is observed. This extremity was not otherwise  evaluated. Left:   The common femoral, deep femoral, proximal and mid  femoral, popliteal, and great saphenous are patent and without  evidence of thrombus;  each is fully compressible and there is no  narrowing of the flow channel on color Doppler imaging. Phasic flow  is observed in the common femoral vein. Limited visualization of the  distal femoral vein. Limited visualization of the posterior tibial  and peroneal veins. There is an incidental finding of a prominent  groin lymph node measuring 1.2cm x 0.6cm. There is an incidental  finding of a popliteal fossa fluid collection measuring 2.2cm x 1.0cm. IMPRESSION:  No evidence of left lower extremity vein thrombosis where   visualized, due to limited visualization of calf veins, isolated calf   vein thrombosis cannot be excluded. There is an incidental finding  of a prominent left groin lymph node. There is an incidental finding of   a left popliteal fossa fluid collection. ADDITIONAL COMMENTS    I have personally reviewed the data relevant to the interpretation of  this  study.     TECHNOLOGIST: Srinivasa Cadena  Signed: 2018 06:39 PM    PHYSICIAN: Matty Thompson MD  Signed: 2018 12:42 PM Home

## 2018-12-20 NOTE — ED PROVIDER NOTES
CC: Leg pain    Left calf pain. Started last week. Difficulty bearing weight starting today. Knee & calf feels swollen. Using Tylenol Extra strength for pain. Stopped Celebrex last week for endoscopy. Estradiol HRT    PsHx: Left knee replacement , Vein stripping   Family Hx: DVT's & PE's in Mom & Sister. EGD 1 week ago. Stretched esophagus, polyps removed. The history is provided by the patient. No  was used. Past Medical History:   Diagnosis Date    Agatston coronary artery calcium score less than 100 2015    zero    Angiomyolipoma of right kidney     3.3 cm upper pole 11/8/15.  AR (allergic rhinitis)     Colon polyp 2009    Tubular adenoma. repeat 3/2013, 3/2014 2 polyps    DDD (degenerative disc disease), lumbar     Dr. Joycelyn Leavitt. L3-L4, L4-L5 injections . radiates to left knee. MRI 10/2015 mild DJD, no clear stenosis    GERD (gastroesophageal reflux disease)     Gluten intolerance     Hyperlipidemia 2009    Osteopenia 2014    mild 14 T-1.6 L fem neck    Raynaud phenomenon     SCC (squamous cell carcinoma)     right forearm. Dr. Bob Le       Past Surgical History:   Procedure Laterality Date    HX BREAST REDUCTION      HX  SECTION      2    HX COLONOSCOPY  2014    2 small polyps.   Dr Layo Yoo HX GI      perianal cyst    HX HEENT      face lift    HX KNEE ARTHROSCOPY      L knee - Zaslov    HX KNEE REPLACEMENT Left 2012    HX LUMBAR LAMINECTOMY  2017    L1-L2 Dr Aline Keller    HX ORTHOPAEDIC      L shoulder, L1, L2 laminectomy    HX ORTHOPAEDIC      L ankle surgery    HX FAN AND BSO      HX TONSILLECTOMY      VASCULAR SURGERY PROCEDURE UNLIST      venous stripping R         Family History:   Problem Relation Age of Onset    Heart Failure Mother     Hypertension Mother     Stroke Mother     High Cholesterol Mother     Thyroid Disease Mother     Rashes/Skin Problems Mother  Cancer Mother         uterine    Heart Disease Mother         premature    Heart Attack Mother     Hypertension Father     Heart Failure Father     High Cholesterol Father     Dementia Father     Heart Disease Father     Stroke Father     Other Sister         deep venous thromboses and Pulmonary embolus    Cancer Paternal Aunt         colon    Diabetes Paternal Grandmother     Cancer Other         colon       Social History     Socioeconomic History    Marital status:      Spouse name: Not on file    Number of children: Not on file    Years of education: Not on file    Highest education level: Not on file   Social Needs    Financial resource strain: Not on file    Food insecurity - worry: Not on file    Food insecurity - inability: Not on file   [employer] needs - medical: Not on file   [employer] needs - non-medical: Not on file   Occupational History    Not on file   Tobacco Use    Smoking status: Never Smoker    Smokeless tobacco: Never Used   Substance and Sexual Activity    Alcohol use: Yes     Alcohol/week: 1.0 oz     Types: 2 Glasses of wine per week    Drug use: No    Sexual activity: No   Other Topics Concern    Not on file   Social History Narrative    Not on file         ALLERGIES: Alfentanil; Gluten; Levaquin [levofloxacin]; Morphine; Percocet [oxycodone-acetaminophen]; and Tramadol    Review of Systems   Constitutional: Negative for appetite change, chills and fever. HENT: Negative. Respiratory: Negative for cough, shortness of breath and wheezing. Cardiovascular: Positive for leg swelling. Negative for chest pain. Gastrointestinal: Negative for abdominal pain, constipation, diarrhea, nausea and vomiting. Genitourinary: Negative for dysuria and urgency. Musculoskeletal: Negative for back pain. Skin: Negative for color change and rash. Neurological: Negative for dizziness and headaches. Psychiatric/Behavioral: Negative.     All other systems reviewed and are negative. Vitals:    12/20/18 1536 12/20/18 1642 12/20/18 1644   BP:  154/82    Pulse: 90 97    Resp: 18 16    Temp:  98.4 °F (36.9 °C)    SpO2: 98% 97% 98%   Weight:  67.6 kg (149 lb)    Height:  5' 3\" (1.6 m)             Physical Exam   Constitutional: She is oriented to person, place, and time. She appears well-developed and well-nourished. HENT:   Head: Normocephalic and atraumatic. Neck: Normal range of motion. Neck supple. Cardiovascular: Normal rate, regular rhythm, normal heart sounds and intact distal pulses. Pulses:       Dorsalis pedis pulses are 2+ on the right side, and 2+ on the left side. LLE leg swelling   Pulmonary/Chest: Effort normal and breath sounds normal. No respiratory distress. She has no wheezes. She has no rales. She exhibits no tenderness. Abdominal: Soft. Bowel sounds are normal. There is no tenderness. There is no guarding. Musculoskeletal: Normal range of motion. Legs:  Neurological: She is alert and oriented to person, place, and time. Skin: Skin is warm and dry. No erythema. Psychiatric: She has a normal mood and affect. Her behavior is normal. Judgment and thought content normal.   Nursing note and vitals reviewed. MDM       Procedures      Assessment & Plan:     Orders Placed This Encounter    DUPLEX LOWER EXT VENOUS LEFT    CBC WITH AUTOMATED DIFF    Hold Sample    METABOLIC PANEL, COMPREHENSIVE       Discussed with Ian Islas MD,ED Provider    Charlie Lord NP  12/20/18  4:50 PM    No DVT. Unknown cause of swelling. Follow-up with Dr. Deana Sequeira for continued work-up. Discussed return precautions. 7:15 PM  The patient has been reevaluated. The patient is ready for discharge. The patient's signs, symptoms, diagnosis, and discharge instructions have been discussed and the patient/ family has conveyed their understanding. The patient is to follow up as recommended or return to the ED should their symptoms worsen. Plan has been discussed and the patient is in agreement. LABORATORY TESTS:  Labs Reviewed   CBC WITH AUTOMATED DIFF - Abnormal; Notable for the following components:       Result Value    RDW 15.3 (*)     MONOCYTES 14 (*)     All other components within normal limits   METABOLIC PANEL, COMPREHENSIVE - Abnormal; Notable for the following components:    Chloride 110 (*)     BUN/Creatinine ratio 21 (*)     Alk. phosphatase 40 (*)     All other components within normal limits   SAMPLES BEING HELD       IMAGING RESULTS:  No results found. MEDICATIONS GIVEN:  Medications - No data to display    IMPRESSION:  1. Left leg swelling        PLAN:  1. Current Discharge Medication List      CONTINUE these medications which have NOT CHANGED    Details   acetaminophen (TYLENOL EXTRA STRENGTH) 500 mg tablet Take 1,000 mg by mouth every six (6) hours as needed for Pain. cyclobenzaprine (FLEXERIL) 5 mg tablet Take 1 Tab by mouth three (3) times daily as needed for Muscle Spasm(s). Qty: 50 Tab, Refills: 2    Associated Diagnoses: Spasm of lumbar paraspinous muscle      pravastatin (PRAVACHOL) 20 mg tablet TAKE 1 TABLET BY MOUTH NIGHTLY  Qty: 30 Tab, Refills: 5      celecoxib (CELEBREX) 200 mg capsule TAKE ONE CAPSULE BY MOUTH EVERY DAY  Qty: 60 Cap, Refills: 5      estradiol (ESTRACE) 0.01 % (0.1 mg/gram) vaginal cream USE 1 GRAM VAGINALLY 3 TIMES WEEKLY  Qty: 42.5 g, Refills: 3      cholecalciferol, vitamin D3, (VITAMIN D3) 2,000 unit tab Take  by mouth. DOCOSAHEXANOIC ACID/EPA (FISH OIL PO) Take  by mouth.      coenzyme q10 (CO Q-10) 10 mg cap Take  by mouth. ASCORBATE CALCIUM (VITAMIN C PO) Take  by mouth.      esomeprazole (NEXIUM) 40 mg capsule Take 1 capsule by mouth daily.   Qty: 30 capsule, Refills: 5      fluticasone (FLONASE) 50 mcg/actuation nasal spray USE 2 SPRAYS IN EACH NOSTRIL DAILY AS NEEDED FOR ALLERGIES  Qty: 3 Bottle, Refills: 8      loratadine (CLARITIN) 10 mg tablet Take 10 mg by mouth daily.      MULTIVITAMIN (MULTIPLE VITAMIN PO) Take  by mouth. 2.   Follow-up Information     Follow up With Specialties Details Why Contact Info    Helio Barrios MD Internal Medicine Schedule an appointment as soon as possible for a visit  [address] [place] [id number] [phone]      [address] [facility] [address] [facility] Emergency Medicine  As needed, If symptoms worsen [address] [facility]  [phone]        3.      Return to ED for new or worsening symptoms       Zackery Morgan NP

## 2018-12-20 NOTE — ED TRIAGE NOTES
Pt arrives with complaints of left sided calf and knee pain. Pt states it is worse on exertion but can still feel the pain when sitting still.  Pt mother and sister have a history of DVT/PE

## 2018-12-21 ENCOUNTER — HOSPITAL ENCOUNTER (EMERGENCY)
Age: 68
Discharge: HOME | End: 2018-12-21
Attending: EMERGENCY MEDICINE
Payer: MEDICARE

## 2018-12-21 VITALS
BODY MASS INDEX: 26.22 KG/M2 | HEART RATE: 99 BPM | TEMPERATURE: 98.3 F | DIASTOLIC BLOOD PRESSURE: 89 MMHG | HEIGHT: 63 IN | WEIGHT: 148 LBS | SYSTOLIC BLOOD PRESSURE: 168 MMHG | RESPIRATION RATE: 18 BRPM | OXYGEN SATURATION: 95 %

## 2018-12-21 DIAGNOSIS — M79.605 PAIN OF LEFT LOWER EXTREMITY: Primary | ICD-10-CM

## 2018-12-21 PROCEDURE — 93971 EXTREMITY STUDY: CPT

## 2018-12-21 PROCEDURE — 74011250637 HC RX REV CODE- 250/637: Performed by: EMERGENCY MEDICINE

## 2018-12-21 PROCEDURE — 99283 EMERGENCY DEPT VISIT LOW MDM: CPT

## 2018-12-21 RX ORDER — ACETAMINOPHEN 325 MG/1
975 TABLET ORAL
Status: DISCONTINUED | OUTPATIENT
Start: 2018-12-21 | End: 2018-12-21 | Stop reason: HOSPADM

## 2018-12-21 RX ORDER — HYDROCODONE BITARTRATE AND ACETAMINOPHEN 5; 325 MG/1; MG/1
1 TABLET ORAL
Status: COMPLETED | OUTPATIENT
Start: 2018-12-21 | End: 2018-12-21

## 2018-12-21 RX ORDER — HYDROCODONE BITARTRATE AND ACETAMINOPHEN 5; 325 MG/1; MG/1
1 TABLET ORAL
Qty: 12 TAB | Refills: 0 | Status: SHIPPED | OUTPATIENT
Start: 2018-12-21 | End: 2019-01-23 | Stop reason: SDUPTHER

## 2018-12-21 RX ADMIN — HYDROCODONE BITARTRATE AND ACETAMINOPHEN 1 TABLET: 5; 325 TABLET ORAL at 14:53

## 2018-12-21 NOTE — ED NOTES
Bedside and Verbal shift change report given to Trino Fischer RN (oncoming nurse) by Alka Saeed RN (offgoing nurse). Report included the following information SBAR, Kardex, ED Summary and MAR.

## 2018-12-21 NOTE — ED NOTES
In with patient to administer Tylenol; patient states \"can I get something a little stronger than Tylenol\".  Dr. Ryanne Brady aware of this request.

## 2018-12-21 NOTE — DISCHARGE INSTRUCTIONS
Thank you for allowing us to care for you today. Please follow-up with your Primary Care provider in the next 2-3 days if your symptoms do not improve. Plan for home:     Continue your Celebrex as directed. tylenol 1000 mg every 6 hours for pain control. Elevate your legs when possible    Follow-up with Dr. [facility] a soon as possible. Come back to the ER if you have worsening symptoms, Chest pain, shortness of breath, fevers over 100.9, shaking chills, nausea or vomiting. Leg and Ankle Edema: Care Instructions  Your Care Instructions  Swelling in the legs, ankles, and feet is called edema. It is common after you sit or stand for a while. Long plane flights or car rides often cause swelling in the legs and feet. You may also have swelling if you have to stand for long periods of time at your job. Problems with the veins in the legs (varicose veins) and changes in hormones can also cause swelling. Sometimes the swelling in the ankles and feet is caused by a more serious problem, such as heart failure, infection, blood clots, or liver or kidney disease. Follow-up care is a key part of your treatment and safety. Be sure to make and go to all appointments, and call your doctor if you are having problems. It's also a good idea to know your test results and keep a list of the medicines you take. How can you care for yourself at home? · If your doctor gave you medicine, take it as prescribed. Call your doctor if you think you are having a problem with your medicine. · Whenever you are resting, raise your legs up. Try to keep the swollen area higher than the level of your heart. · Take breaks from standing or sitting in one position. ? Walk around to increase the blood flow in your lower legs. ? Move your feet and ankles often while you stand, or tighten and relax your leg muscles. · Wear support stockings. Put them on in the morning, before swelling gets worse. · Eat a balanced diet.  Lose weight if you need to. · Limit the amount of salt (sodium) in your diet. Salt holds fluid in the body and may increase swelling. When should you call for help? Call 911 anytime you think you may need emergency care. For example, call if:    · You have symptoms of a blood clot in your lung (called a pulmonary embolism). These may include:  ? Sudden chest pain. ? Trouble breathing. ? Coughing up blood.    Call your doctor now or seek immediate medical care if:    · You have signs of a blood clot, such as:  ? Pain in your calf, back of the knee, thigh, or groin. ? Redness and swelling in your leg or groin.     · You have symptoms of infection, such as:  ? Increased pain, swelling, warmth, or redness. ? Red streaks or pus. ? A fever.    Watch closely for changes in your health, and be sure to contact your doctor if:    · Your swelling is getting worse.     · You have new or worsening pain in your legs.     · You do not get better as expected. Where can you learn more? Go to http://catrachito-chetan.info/. Enter S912 in the search box to learn more about \"Leg and Ankle Edema: Care Instructions. \"  Current as of: November 20, 2017  Content Version: 11.8  © 2091-6238 [employer]. Care instructions adapted under license by [employer] (which disclaims liability or warranty for this information). If you have questions about a medical condition or this instruction, always ask your healthcare professional. [place] [address] any warranty or liability for your use of this information.

## 2018-12-21 NOTE — ED TRIAGE NOTES
Patient arrives with c/o LLE pain and swelling x a few days with worsening today. States she was evaluated with doppler yesterday at [facility] ED but is not satisfied with negative results.

## 2018-12-21 NOTE — ED PROVIDER NOTES
70 y.o. female with extensive past medical history, please see list, significant for HLD, Raynaud, DDD, who presents ambulatory to the ED with cc of leg pain. Pt reports worsening left calf pain that started last week with associated distal leg swelling. She states she was seen yesterday at [facility] ED to rule out DVT and had negative doppler. She denies history of the same, but reports family history of multiple DVT in her mother and sister. Today, she states the pain and swelling are worse, and she would like to re-evaluate to rule out DVT. Pt notes history of circulation problem and had \"stripping and ligation of a vein in my right leg\" to treat pain in the area. She denies history of phlebitis. Pt specifically denies any increased skin warmth over her calf, hemoptysis, shortness of breath, chest pain, abdominal pain, or inguinal pain. There are no other acute medical concerns at this time. Social Hx: Never Tobacco use, yes EtOH use (2 glasses/wine per week), denies Illicit Drug use  Significant FMHx: multiple DVTs in sister and mother  PCP: Mendez Juarez MD    Note written by Ethan Lin, as dictated by Kenneth Garcia, DO 1:59 PM    Old Chart Review: Normal CBC and CMP yesterday, normal US that was negative for DVT, no phlebitis; it was positive for a 1.2 x 0.6 cm lymph node, vapor cyst in popliteal fossa as well      The history is provided by the patient. Past Medical History:   Diagnosis Date    Agatston coronary artery calcium score less than 100 11/2015    zero    Angiomyolipoma of right kidney     3.3 cm upper pole 11/8/15.  AR (allergic rhinitis)     Colon polyp 1/29/2009    Tubular adenoma. repeat 3/2013, 3/2014 2 polyps    DDD (degenerative disc disease), lumbar     Dr. Bree Cabrera. L3-L4, L4-L5 injections 2013. radiates to left knee. MRI 10/2015 mild DJD, no clear stenosis    GERD (gastroesophageal reflux disease)     Gluten intolerance     Hyperlipidemia 1/8/2009    Osteopenia 2014    mild 14 T-1.6 L fem neck    Raynaud phenomenon     SCC (squamous cell carcinoma) 2014    right forearm. Dr. Susannah James       Past Surgical History:   Procedure Laterality Date    HX BREAST REDUCTION  2009    HX  SECTION      2    HX COLONOSCOPY  2014    2 small polyps.   Dr Isabelle Tian HX GI      perianal cyst    HX HEENT      face lift    HX KNEE ARTHROSCOPY      L knee - Zaslov    HX KNEE REPLACEMENT Left 2012    HX LUMBAR LAMINECTOMY  2017    L1-L2 Dr Chiqui Lopez    HX ORTHOPAEDIC      L shoulder, L1, L2 laminectomy    HX ORTHOPAEDIC      L ankle surgery    HX FAN AND BSO      HX TONSILLECTOMY      VASCULAR SURGERY PROCEDURE UNLIST      venous stripping R         Family History:   Problem Relation Age of Onset    Heart Failure Mother     Hypertension Mother     Stroke Mother     High Cholesterol Mother     Thyroid Disease Mother     Rashes/Skin Problems Mother     Cancer Mother         uterine    Heart Disease Mother         premature    Heart Attack Mother     Hypertension Father     Heart Failure Father     High Cholesterol Father     Dementia Father     Heart Disease Father     Stroke Father     Other Sister         deep venous thromboses and Pulmonary embolus    Cancer Paternal Aunt         colon    Diabetes Paternal Grandmother     Cancer Other         colon       Social History     Socioeconomic History    Marital status:      Spouse name: Not on file    Number of children: Not on file    Years of education: Not on file    Highest education level: Not on file   Social Needs    Financial resource strain: Not on file    Food insecurity - worry: Not on file    Food insecurity - inability: Not on file   [employer] needs - medical: Not on file   [employer] needs - non-medical: Not on file   Occupational History    Not on file   Tobacco Use    Smoking status: Never Smoker    Smokeless tobacco: Never Used Substance and Sexual Activity    Alcohol use: Yes     Alcohol/week: 1.0 oz     Types: 2 Glasses of wine per week    Drug use: No    Sexual activity: No   Other Topics Concern    Not on file   Social History Narrative    Not on file         ALLERGIES: Alfentanil; Gluten; Levaquin [levofloxacin]; Morphine; Percocet [oxycodone-acetaminophen]; and Tramadol    Review of Systems   Respiratory: Negative for cough and shortness of breath. Cardiovascular: Positive for leg swelling. Negative for chest pain. Gastrointestinal: Negative for abdominal pain. Genitourinary: Negative for pelvic pain. Musculoskeletal: Positive for myalgias. All other systems reviewed and are negative. Vitals:    12/21/18 1401   BP: 168/89   Pulse: 99   Resp: 18   Temp: 98.3 °F (36.8 °C)   SpO2: 95%   Weight: 67.1 kg (148 lb)   Height: 5' 3\" (1.6 m)                  Constitutional: Pt is awake and alert. Pt appears well-developed and well-nourished. NAD. HENT:   Head: Normocephalic and atraumatic. Nose: Nose normal.   Mouth/Throat: Oropharynx is clear and moist. No oropharyngeal exudate. Eyes: Conjunctivae and extraocular motions are normal. Pupils are equal, round, and reactive to light. Right eye exhibits no discharge. Left eye exhibits no discharge. No scleral icterus. Neck: No tracheal deviation present. Supple neck. Cardiovascular: Normal rate, regular rhythm, normal heart sounds and intact distal pulses. Exam reveals no gallop and no friction rub. No murmur heard. Good DP and PD pulses. Pulmonary/Chest: Effort normal and breath sounds normal.  Pt  has no wheezes. Pt  has no rales. Abdominal: Soft. Pt  exhibits no distension and no mass. No tenderness. Pt  has no rebound and no guarding. Musculoskeletal:  Pt  exhibits no edema and no tenderness. Ext: Normal ROM in all four extremities; distal pulses are normal. 1+ pitting edema in left leg.  Mild tenderness in the left popliteal area and upper calf.  Neurological:  Pt is alert. nonfocal neuro exam.  Skin: Skin is warm and dry. Pt  is not diaphoretic. Psychiatric:  Pt  has a normal mood and affect. Behavior is normal.   Note written by Claudette Barton. Elvis Meza, as dictated by Dannielle Fisher MD 2:42 PM        MDM       Procedures    PROGRESS NOTE:  3:09 PM  Pt is listed to be allergic to percocet and morphine, but states she can take hydrocodone. PROGRESS NOTE:  3:36 PM  Discussed venous doplar results and supsect pain is due to previously noted vapor cyst in the popliteal area. Recommended ericka stockings, leg elevation and will give something stronger for pain.

## 2018-12-21 NOTE — PROCEDURES
[facility]  *** FINAL REPORT ***    Name: Tiffany Rivero  MRN: [id number]    Inpatient  : 03 Aug 1950  HIS Order #: [id number]  [address] [facility] [place] [facility] Visit #: [id number]  Date: 21 Dec 2018    TYPE OF TEST: Peripheral Venous Testing    REASON FOR TEST  Pain in limb, Limb swelling    Left Leg:-  Deep venous thrombosis:           No  Superficial venous thrombosis:    No  Deep venous insufficiency:        Yes  Superficial venous insufficiency: No      INTERPRETATION/FINDINGS  PROCEDURE:  LEFT LOWER EXTREMITY VENOUS DUPLEX . Evaluation of lower  extremity veins with ultrasound (B-mode imaging, pulsed Doppler, color   Doppler). Includes the common femoral, deep femoral, femoral,  popliteal, posterior tibial, peroneal, and great saphenous veins. Other veins, for example the gastrocnemius and soleal veins, may also  be visualized. FINDINGS: Merilynn Defiance scale and color flow duplex images of the veins in the  left lower extremity demonstrate normal compressibility, spontaneous  and augmented flow profiles, and absence of filling defects throughout   the deep and superficial veins in the left lower extremity. CONCLUSION:  Left lower extremity venous duplex negative for deep  venous thrombosis or thrombophlebitis. Right common femoral vein is  thrombus free. NOTE: Reflux noted in the left popliteal vein. ADDITIONAL COMMENTS    I have personally reviewed the data relevant to the interpretation of  this  study.     TECHNOLOGIST: Brooke Cortes  Signed: 2018 03:29 PM    PHYSICIAN: Brayden Pringle MD  Signed: 2018 06:37 AM

## 2018-12-21 NOTE — PATIENT INSTRUCTIONS
Gastroesophageal Reflux Disease (GERD): Care Instructions  Your Care Instructions    Gastroesophageal reflux disease (GERD) is the backward flow of stomach acid into the esophagus. The esophagus is the tube that leads from your throat to your stomach. A one-way valve prevents the stomach acid from moving up into this tube. When you have GERD, this valve does not close tightly enough. If you have mild GERD symptoms including heartburn, you may be able to control the problem with antacids or over-the-counter medicine. Changing your diet, losing weight, and making other lifestyle changes can also help reduce symptoms. Follow-up care is a key part of your treatment and safety. Be sure to make and go to all appointments, and call your doctor if you are having problems. It's also a good idea to know your test results and keep a list of the medicines you take. How can you care for yourself at home? · Take your medicines exactly as prescribed. Call your doctor if you think you are having a problem with your medicine. · Your doctor may recommend over-the-counter medicine. For mild or occasional indigestion, antacids, such as Tums, Gaviscon, Mylanta, or Maalox, may help. Your doctor also may recommend over-the-counter acid reducers, such as Pepcid AC, Tagamet HB, Zantac 75, or Prilosec. Read and follow all instructions on the label. If you use these medicines often, talk with your doctor. · Change your eating habits. ? It's best to eat several small meals instead of two or three large meals. ? After you eat, wait 2 to 3 hours before you lie down. ? Chocolate, mint, and alcohol can make GERD worse. ? Spicy foods, foods that have a lot of acid (like tomatoes and oranges), and coffee can make GERD symptoms worse in some people. If your symptoms are worse after you eat a certain food, you may want to stop eating that food to see if your symptoms get better.   · Do not smoke or chew tobacco. Smoking can make GERD worse. If you need help quitting, talk to your doctor about stop-smoking programs and medicines. These can increase your chances of quitting for good. · If you have GERD symptoms at night, raise the head of your bed 6 to 8 inches by putting the frame on blocks or placing a foam wedge under the head of your mattress. (Adding extra pillows does not work.)  · Do not wear tight clothing around your middle. · Lose weight if you need to. Losing just 5 to 10 pounds can help. When should you call for help? Call your doctor now or seek immediate medical care if:    · You have new or different belly pain.     · Your stools are black and tarlike or have streaks of blood.    Watch closely for changes in your health, and be sure to contact your doctor if:    · Your symptoms have not improved after 2 days.     · Food seems to catch in your throat or chest.   Where can you learn more? Go to http://catrachito-chetan.info/. Enter F438 in the search box to learn more about \"Gastroesophageal Reflux Disease (GERD): Care Instructions. \"  Current as of: March 28, 2018  Content Version: 11.8  © 2854-1089 [employer]. Care instructions adapted under license by [employer] (which disclaims liability or warranty for this information). If you have questions about a medical condition or this instruction, always ask your healthcare professional. [address] any warranty or liability for your use of this information. Gastroesophageal Reflux Disease (GERD): Care Instructions  Your Care Instructions    Gastroesophageal reflux disease (GERD) is the backward flow of stomach acid into the esophagus. The esophagus is the tube that leads from your throat to your stomach. A one-way valve prevents the stomach acid from moving up into this tube. When you have GERD, this valve does not close tightly enough.   If you have mild GERD symptoms including heartburn, you may be able to control the problem with antacids or over-the-counter medicine. Changing your diet, losing weight, and making other lifestyle changes can also help reduce symptoms. Follow-up care is a key part of your treatment and safety. Be sure to make and go to all appointments, and call your doctor if you are having problems. It's also a good idea to know your test results and keep a list of the medicines you take. How can you care for yourself at home? · Take your medicines exactly as prescribed. Call your doctor if you think you are having a problem with your medicine. · Your doctor may recommend over-the-counter medicine. For mild or occasional indigestion, antacids, such as Tums, Gaviscon, Mylanta, or Maalox, may help. Your doctor also may recommend over-the-counter acid reducers, such as Pepcid AC, Tagamet HB, Zantac 75, or Prilosec. Read and follow all instructions on the label. If you use these medicines often, talk with your doctor. · Change your eating habits. ? It's best to eat several small meals instead of two or three large meals. ? After you eat, wait 2 to 3 hours before you lie down. ? Chocolate, mint, and alcohol can make GERD worse. ? Spicy foods, foods that have a lot of acid (like tomatoes and oranges), and coffee can make GERD symptoms worse in some people. If your symptoms are worse after you eat a certain food, you may want to stop eating that food to see if your symptoms get better. · Do not smoke or chew tobacco. Smoking can make GERD worse. If you need help quitting, talk to your doctor about stop-smoking programs and medicines. These can increase your chances of quitting for good. · If you have GERD symptoms at night, raise the head of your bed 6 to 8 inches by putting the frame on blocks or placing a foam wedge under the head of your mattress. (Adding extra pillows does not work.)  · Do not wear tight clothing around your middle. · Lose weight if you need to.  Losing just 5 to 10 pounds can help. When should you call for help? Call your doctor now or seek immediate medical care if:    · You have new or different belly pain.     · Your stools are black and tarlike or have streaks of blood.    Watch closely for changes in your health, and be sure to contact your doctor if:    · Your symptoms have not improved after 2 days.     · Food seems to catch in your throat or chest.   Where can you learn more? Go to http://catrachito-chetan.info/. Enter M857 in the search box to learn more about \"Gastroesophageal Reflux Disease (GERD): Care Instructions. \"  Current as of: March 28, 2018  Content Version: 11.8  © 6703-1900 [employer]. Care instructions adapted under license by [employer] (which disclaims liability or warranty for this information). If you have questions about a medical condition or this instruction, always ask your healthcare professional. [address] any warranty or liability for your use of this information.

## 2018-12-21 NOTE — DISCHARGE INSTRUCTIONS
Leg Pain: Care Instructions  Your Care Instructions  Many things can cause leg pain. Too much exercise or overuse can cause a muscle cramp (or charley horse). You can get leg cramps from not eating a balanced diet that has enough potassium, calcium, and other minerals. If you do not drink enough fluids or are taking certain medicines, you may develop leg cramps. Other causes of leg pain include injuries, blood flow problems, nerve damage, and twisted and enlarged veins (varicose veins). You can usually ease pain with self-care. Your doctor may recommend that you rest your leg and keep it elevated. Follow-up care is a key part of your treatment and safety. Be sure to make and go to all appointments, and call your doctor if you are having problems. It's also a good idea to know your test results and keep a list of the medicines you take. How can you care for yourself at home? · Take pain medicines exactly as directed. ? If the doctor gave you a prescription medicine for pain, take it as prescribed. ? If you are not taking a prescription pain medicine, ask your doctor if you can take an over-the-counter medicine. · Take any other medicines exactly as prescribed. Call your doctor if you think you are having a problem with your medicine. · Rest your leg while you have pain, and avoid standing for long periods of time. · Prop up your leg at or above the level of your heart when possible. · Make sure you are eating a balanced diet that is rich in calcium, potassium, and magnesium, especially if you are pregnant. · If directed by your doctor, put ice or a cold pack on the area for 10 to 20 minutes at a time. Put a thin cloth between the ice and your skin. · Your leg may be in a splint, a brace, or an elastic bandage, and you may have crutches to help you walk. Follow your doctor's directions about how long to wear supports and how to use the crutches. When should you call for help?   Call 911 anytime you think you may need emergency care. For example, call if:    · You have sudden chest pain and shortness of breath, or you cough up blood.     · Your leg is cool or pale or changes color.    Call your doctor now or seek immediate medical care if:    · You have increasing or severe pain.     · Your leg suddenly feels weak and you cannot move it.     · You have signs of a blood clot, such as:  ? Pain in your calf, back of the knee, thigh, or groin. ? Redness and swelling in your leg or groin.     · You have signs of infection, such as:  ? Increased pain, swelling, warmth, or redness. ? Red streaks leading from the sore area. ? Pus draining from a place on your leg. ? A fever.     · You cannot bear weight on your leg.    Watch closely for changes in your health, and be sure to contact your doctor if:    · You do not get better as expected. Where can you learn more? Go to http://catrachito-chetan.info/. Enter S675 in the search box to learn more about \"Leg Pain: Care Instructions. \"  Current as of: November 20, 2017  Content Version: 11.8  © 2526-0174 [employer]. Care instructions adapted under license by [employer]) (which disclaims liability or warranty for this information). If you have questions about a medical condition or this instruction, always ask your healthcare professional. [address] any warranty or liability for your use of this information.

## 2018-12-22 NOTE — PROGRESS NOTES
Subjective:      Richard Sanders is a 70 y.o. female presents for ongoing evaluation of GERD symptoms. Appetite is fair. Eating a regular diet without difficulty. She continues to experience epigastric discomfort-no definite relationship to oral intake; no regurgitation. Objective:     Visit Vitals  /90 (BP 1 Location: Left arm, BP Patient Position: Sitting)   Pulse 98   Temp 98.3 °F (36.8 °C) (Oral)   Resp 18   Ht 5' 3\" (1.6 m)   Wt 153 lb (69.4 kg)   LMP 01/01/2000   SpO2 98%   BMI 27.10 kg/m²       General:  alert, cooperative, no distress, appears stated age   Abdomen: deferred   Incision:   deferred     Assessment:     GERD-NM gastric emptying study with t1/2=96 minutes (essentially normal). Report of EGD reviewed-no esophagitis although she recounts endoscopic dilation performed. Manometry performed (report pending); she has still not obtained previous recent UGI series. Plan:     1. Continuecurrent medications. 2. Will obtain manometry report. 3. She will obtain UGI images and deliver to our office. 4. Follow-up after above.

## 2019-01-14 ENCOUNTER — TELEPHONE (OUTPATIENT)
Dept: SURGERY | Age: 69
End: 2019-01-14

## 2019-01-14 NOTE — TELEPHONE ENCOUNTER
Received call from patient who stated she last saw Dr. Pasha Junior before [place]. She states she had dropped off a CD and that Dr. Pasha Junior was to receive information from Dr. Sabi Barillas. She would like to know the status of what Dr. Pasha Junior has decided to do. She was told she would get a call and has not heard from him.

## 2019-01-14 NOTE — TELEPHONE ENCOUNTER
I called the patient and I let her know that dr Jose Baugh is in surgery today but I will see him tomorrow in clinic and I will bring this to his attention. Pt thanked me for calling her back and in agreement.

## 2019-01-15 ENCOUNTER — TELEPHONE (OUTPATIENT)
Dept: SURGERY | Age: 69
End: 2019-01-15

## 2019-01-15 NOTE — TELEPHONE ENCOUNTER
I called the patient and I let her know that Dr Cathie Frederick looked at her disc she dropped off and I have called over to Dr Sharonda Perez office and we are waiting to get the Manometry results and then Dr Cathie Frederick will call her back after he has reviewed . Pt in agreement.

## 2019-01-17 ENCOUNTER — TELEPHONE (OUTPATIENT)
Dept: INTERNAL MEDICINE CLINIC | Age: 69
End: 2019-01-17

## 2019-01-17 NOTE — TELEPHONE ENCOUNTER
Patient called to report that she is having back pain and wants to see DR. Wells only tomorrow. Patient was offered NP tomorrow but declined. Patient is requesting that nurse call her back regarding back pain and appointment.      [phone]

## 2019-01-17 NOTE — TELEPHONE ENCOUNTER
I spoke with patient, she states she has back pain on/off since October. Appts were offered with other providers, pt declined.  appt with pcp scheduled on Wednesday at 130pm.

## 2019-01-21 ENCOUNTER — TELEPHONE (OUTPATIENT)
Dept: INTERNAL MEDICINE CLINIC | Age: 69
End: 2019-01-21

## 2019-01-21 NOTE — TELEPHONE ENCOUNTER
I spoke with patient, she is complaining of back pain and asked if pcp had a sooner appt than Wednesday. I advise no sooner appt. I advise if sx are that severe she needs to be seen in urgent care. She can also call [facility] 24/7 or dispatch health. Pt declined, she will wait until Wednesday.

## 2019-01-21 NOTE — TELEPHONE ENCOUNTER
----- Message from Mark Carballo sent at 1/21/2019  9:12 AM EST -----  Regarding: /Telephone  Contact: [phone]  Pt is requesting a callback to discuss experiencing pain in lower back Best contact [phone]

## 2019-01-22 ENCOUNTER — HOSPITAL ENCOUNTER (EMERGENCY)
Age: 69
Discharge: HOME | End: 2019-01-22
Attending: EMERGENCY MEDICINE
Payer: MEDICARE

## 2019-01-22 ENCOUNTER — APPOINTMENT (OUTPATIENT)
Dept: GENERAL RADIOLOGY | Age: 69
End: 2019-01-22
Attending: EMERGENCY MEDICINE
Payer: MEDICARE

## 2019-01-22 VITALS
WEIGHT: 148 LBS | DIASTOLIC BLOOD PRESSURE: 81 MMHG | TEMPERATURE: 97.8 F | SYSTOLIC BLOOD PRESSURE: 138 MMHG | HEART RATE: 64 BPM | RESPIRATION RATE: 16 BRPM | OXYGEN SATURATION: 94 % | BODY MASS INDEX: 26.22 KG/M2 | HEIGHT: 63 IN

## 2019-01-22 DIAGNOSIS — M51.36 DDD (DEGENERATIVE DISC DISEASE), LUMBAR: Primary | ICD-10-CM

## 2019-01-22 DIAGNOSIS — M54.41 RIGHT-SIDED LOW BACK PAIN WITH RIGHT-SIDED SCIATICA, UNSPECIFIED CHRONICITY: ICD-10-CM

## 2019-01-22 PROCEDURE — 99283 EMERGENCY DEPT VISIT LOW MDM: CPT

## 2019-01-22 PROCEDURE — 72100 X-RAY EXAM L-S SPINE 2/3 VWS: CPT

## 2019-01-22 PROCEDURE — 96372 THER/PROPH/DIAG INJ SC/IM: CPT

## 2019-01-22 PROCEDURE — 74011250636 HC RX REV CODE- 250/636: Performed by: EMERGENCY MEDICINE

## 2019-01-22 RX ORDER — METHOCARBAMOL 750 MG/1
750 TABLET, FILM COATED ORAL 4 TIMES DAILY
Qty: 20 TAB | Refills: 0 | Status: ON HOLD | OUTPATIENT
Start: 2019-01-22 | End: 2019-07-01

## 2019-01-22 RX ORDER — PREDNISONE 20 MG/1
60 TABLET ORAL DAILY
Qty: 15 TAB | Refills: 0 | Status: SHIPPED | OUTPATIENT
Start: 2019-01-22 | End: 2019-01-27

## 2019-01-22 RX ORDER — DICLOFENAC SODIUM 10 MG/G
2 GEL TOPICAL
COMMUNITY
End: 2020-12-17 | Stop reason: ALTCHOICE

## 2019-01-22 RX ORDER — HYDROMORPHONE HYDROCHLORIDE 2 MG/1
4 TABLET ORAL
Status: DISCONTINUED | OUTPATIENT
Start: 2019-01-22 | End: 2019-01-22

## 2019-01-22 RX ORDER — LIDOCAINE 50 MG/G
PATCH TOPICAL
Qty: 1 EACH | Refills: 0 | Status: ON HOLD | OUTPATIENT
Start: 2019-01-22 | End: 2019-07-01

## 2019-01-22 RX ORDER — HYDROMORPHONE HYDROCHLORIDE 2 MG/ML
2 INJECTION, SOLUTION INTRAMUSCULAR; INTRAVENOUS; SUBCUTANEOUS ONCE
Status: COMPLETED | OUTPATIENT
Start: 2019-01-22 | End: 2019-01-22

## 2019-01-22 RX ADMIN — HYDROMORPHONE HYDROCHLORIDE 2 MG: 2 INJECTION, SOLUTION INTRAMUSCULAR; INTRAVENOUS; SUBCUTANEOUS at 12:25

## 2019-01-22 NOTE — DISCHARGE INSTRUCTIONS
Rest, ice to your back. Gentle stretches  Sciatica: Exercises  Your Care Instructions  Here are some examples of typical rehabilitation exercises for your condition. Start each exercise slowly. Ease off the exercise if you start to have pain. Your doctor or physical therapist will tell you when you can start these exercises and which ones will work best for you. When you are not being active, find a comfortable position for rest. Some people are comfortable on the floor or a medium-firm bed with a small pillow under their head and another under their knees. Some people prefer to lie on their side with a pillow between their knees. Don't stay in one position for too long. Take short walks (10 to 20 minutes) every 2 to 3 hours. Avoid slopes, hills, and stairs until you feel better. Walk only distances you can manage without pain, especially leg pain. How to do the exercises  Back stretches    1. Get down on your hands and knees on the floor. 2. Relax your head and allow it to droop. Round your back up toward the ceiling until you feel a nice stretch in your upper, middle, and lower back. Hold this stretch for as long as it feels comfortable, or about 15 to 30 seconds. 3. Return to the starting position with a flat back while you are on your hands and knees. 4. Let your back sway by pressing your stomach toward the floor. Lift your buttocks toward the ceiling. 5. Hold this position for 15 to 30 seconds. 6. Repeat 2 to 4 times. Follow-up care is a key part of your treatment and safety. Be sure to make and go to all appointments, and call your doctor if you are having problems. It's also a good idea to know your test results and keep a list of the medicines you take. Where can you learn more? Go to http://catrachito-chetan.info/. Enter I719 in the search box to learn more about \"Sciatica: Exercises. \"  Current as of: September 20, 2018  Content Version: 11.9  © 6745-1282 [employer]. Care instructions adapted under license by [employer] (which disclaims liability or warranty for this information). If you have questions about a medical condition or this instruction, always ask your healthcare professional. [address] any warranty or liability for your use of this information. Sciatica: Care Instructions  Your Care Instructions    Sciatica (say \"vgk-TI-qx-kuh\") is an irritation of one of the sciatic nerves, which come from the spinal cord in the lower back. The sciatic nerves and their branches extend down through the buttock to the foot. Sciatica can develop when an injured disc in the back presses against a spinal nerve root. Its main symptom is pain, numbness, or weakness that is often worse in the leg or foot than in the back. Sciatica often will improve and go away with time. Early treatment usually includes medicines and exercises to relieve pain. Follow-up care is a key part of your treatment and safety. Be sure to make and go to all appointments, and call your doctor if you are having problems. It's also a good idea to know your test results and keep a list of the medicines you take. How can you care for yourself at home? · Take pain medicines exactly as directed. ? If the doctor gave you a prescription medicine for pain, take it as prescribed. ? If you are not taking a prescription pain medicine, ask your doctor if you can take an over-the-counter medicine. · Use heat or ice to relieve pain. ? To apply heat, put a warm water bottle, heating pad set on low, or warm cloth on your back. Do not go to sleep with a heating pad on your skin. ? To use ice, put ice or a cold pack on the area for 10 to 20 minutes at a time. Put a thin cloth between the ice and your skin. · Avoid sitting if possible, unless it feels better than standing. · Alternate lying down with short walks.  Increase your walking distance as you are able to without making your symptoms worse. · Do not do anything that makes your symptoms worse. When should you call for help? Call 911 anytime you think you may need emergency care. For example, call if:    · You are unable to move a leg at all.   [facility] your doctor now or seek immediate medical care if:    · You have new or worse symptoms in your legs or buttocks. Symptoms may include:  ? Numbness or tingling. ? Weakness. ? Pain.     · You lose bladder or bowel control.    Watch closely for changes in your health, and be sure to contact your doctor if:    · You are not getting better as expected. Where can you learn more? Go to http://catrachito-chetan.info/. Enter [phone] in the search box to learn more about \"Sciatica: Care Instructions. \"  Current as of: September 20, 2018  Content Version: 11.9  © 9097-4747 [employer]. Care instructions adapted under license by [employer] (which disclaims liability or warranty for this information). If you have questions about a medical condition or this instruction, always ask your healthcare professional. [address] any warranty or liability for your use of this information.

## 2019-01-22 NOTE — ED NOTES
Patient discharged by [facility]. Papers given by RN and opportunities for questions provided. Home with family.

## 2019-01-22 NOTE — ED TRIAGE NOTES
Back pain/rt sciatica in November, treated with steroids x 2. Worse in last 2 weeks. Patient crying in triage. Lack of sleep. No loss of bladder/bowel control. Hx of rt. sciatica and back surgery. Has doc appointments scheduled.

## 2019-01-22 NOTE — ED PROVIDER NOTES
70 y.o. female with past medical history significant for GERD, colon polyp, osteopenia, Raynaud phenomenon, SCC, DDD, hyperlipidemia, angiomyolipoma of right kidney who presents from home via private vehicle, accompanied by , with chief complaint of right-sided back pain. Patient is ambulatory and c/o chronic back pain since November 2018 that worsened over the last week. She has taken \"2 courses of steroids\" with no relief of pain. She is also constipated. At 0830 today, she took 2 muscle relaxer pills. Patient has appointment with PCP tomorrow and appointment with Neurologist in February. She plans on having MRI prior to back surgery. She has history of right-sided sciatica, bulging disc, and she notes previous back surgery. Patient specifically denies urinary symptoms and incontinence. There are no other acute medical concerns at this time. Social hx: Never tobacco smoker; Yes EtOH use; Denies illicit drug use PCP: Teodora Fox MD 
Neurologist: Charlie Valverde MD 
 
Note written by Ethan Sadler, as dictated by Maxwell Bridges,  11:49 AM 
 
12:10 PM 
Pt with hx of right sided back pain, with hx of surgery for same 4/2017. Sx worsened 11/2017 and again the last week. Pt last had steroids 11/2018. She has used celebrex, voltaren gel, flexeril and tylenol without relief. pcp appt tomorrow. Neurology 2/14/2019. Pt denies injury, abd pain, changes in bladder or bowel function, saddle anesthesia, or inability to ambulate. She specifically denies any fevers, chills, nausea, vomiting, chest pain,urinary sx, shortness of breath, headache, rash, diarrhea, sweating or weight loss. r 
 
 
The history is provided by the patient and the spouse. No  was used. Past Medical History:  
Diagnosis Date  Agatston coronary artery calcium score less than 100 11/2015  
 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  AR (allergic rhinitis)  Colon polyp 2009 Tubular adenoma. repeat 3/2013, 3/2014 2 polyps  DDD (degenerative disc disease), lumbar Dr. Jennifer Walsh. L3-L4, L4-L5 injections . radiates to left knee. MRI 10/2015 mild DJD, no clear stenosis  GERD (gastroesophageal reflux disease)  Gluten intolerance  Hyperlipidemia 2009  Osteopenia 2014  
 mild 14 T-1.6 L fem neck  Raynaud phenomenon  SCC (squamous cell carcinoma)   
 right forearm. Dr. Brandon Logan Past Surgical History:  
Procedure Laterality Date  HX BREAST REDUCTION    HX  SECTION    
 2  
 HX COLONOSCOPY  2014  
 2 small polyps. Dr Yovanny Escobar  HX GI    
 perianal cyst  
 HX HEENT    
 face lift  HX KNEE ARTHROSCOPY L knee - Zaslov  HX KNEE REPLACEMENT Left 2012  HX LUMBAR LAMINECTOMY  2017 L1-L2 Dr Alvaro Juarez  HX ORTHOPAEDIC    
 L shoulder, L1, L2 laminectomy  HX ORTHOPAEDIC    
 L ankle surgery  HX FAN AND BSO  HX TONSILLECTOMY  VASCULAR SURGERY PROCEDURE UNLIST    
 venous stripping R Family History:  
Problem Relation Age of Onset  Heart Failure Mother  Hypertension Mother  Stroke Mother  High Cholesterol Mother  Thyroid Disease Mother  Rashes/Skin Problems Mother  Cancer Mother   
     uterine  Heart Disease Mother   
     premature  Heart Attack Mother  Hypertension Father  Heart Failure Father  High Cholesterol Father  Dementia Father  Heart Disease Father  Stroke Father  Other Sister   
     deep venous thromboses and Pulmonary embolus  Cancer Paternal Aunt   
     colon  Diabetes Paternal Grandmother  Cancer Other   
     colon Social History Socioeconomic History  Marital status:  Spouse name: Not on file  Number of children: Not on file  Years of education: Not on file  Highest education level: Not on file Social Needs  Financial resource strain: Not on file  Food insecurity - worry: Not on file  Food insecurity - inability: Not on file  Transportation needs - medical: Not on file  Transportation needs - non-medical: Not on file Occupational History  Not on file Tobacco Use  Smoking status: Never Smoker  Smokeless tobacco: Never Used Substance and Sexual Activity  Alcohol use: Yes Alcohol/week: 1.0 oz Types: 2 Glasses of wine per week  Drug use: No  
 Sexual activity: No  
Other Topics Concern  Not on file Social History Narrative  Not on file ALLERGIES: Alfentanil; Gluten; Levaquin [levofloxacin]; Morphine; Percocet [oxycodone-acetaminophen]; and Tramadol Review of Systems Constitutional: Negative for chills and fever. HENT: Negative for congestion, rhinorrhea, sneezing and sore throat. Eyes: Negative for redness and visual disturbance. Respiratory: Negative for shortness of breath. Cardiovascular: Negative for chest pain and leg swelling. Gastrointestinal: Negative for abdominal pain, nausea and vomiting. Genitourinary: Negative for difficulty urinating and frequency. Musculoskeletal: Positive for back pain. Negative for myalgias and neck stiffness. Skin: Negative for rash. Neurological: Negative for dizziness, syncope, weakness and headaches. Hematological: Negative for adenopathy. Patient Vitals for the past 12 hrs: 
 Temp Pulse Resp BP SpO2  
01/22/19 1237  74 16 136/75 95 % 01/22/19 1149 97.8 °F (36.6 °C) 90 14 144/77 100 % Physical Exam  
Constitutional: She is oriented to person, place, and time. She appears well-developed and well-nourished. No distress. HENT:  
Head: Normocephalic and atraumatic. Right Ear: External ear normal.  
Left Ear: External ear normal.  
Eyes: EOM are normal. Pupils are equal, round, and reactive to light. Neck: Neck supple. Cardiovascular: Normal rate, regular rhythm, normal heart sounds and intact distal pulses. Exam reveals no gallop and no friction rub. No murmur heard. Pulmonary/Chest: Effort normal and breath sounds normal. No stridor. No respiratory distress. She has no wheezes. She has no rales. She exhibits no tenderness. Abdominal: Soft. Bowel sounds are normal. She exhibits no distension and no mass. There is no tenderness. There is no rebound and no guarding. Musculoskeletal: Normal range of motion. She exhibits no edema, tenderness or deformity. Back:right lumbar tttp with midline ttp. No left sided ttp no swelling or step off. No discoloration. No deformity or lesions. Neg SLR neg EHL neg SKY. Ambulates without assistance. Distal n/v intact. Cap refill brisk Neurological: She is alert and oriented to person, place, and time. No cranial nerve deficit. Coordination normal.  
Skin: Skin is warm and dry. Capillary refill takes less than 2 seconds. No rash noted. No erythema. No pallor. Psychiatric: She has a normal mood and affect. Her behavior is normal.  
Nursing note and vitals reviewed. MDM Number of Diagnoses or Management Options Amount and/or Complexity of Data Reviewed Tests in the radiology section of CPT®: ordered and reviewed Obtain history from someone other than the patient: yes () Review and summarize past medical records: yes Independent visualization of images, tracings, or specimens: yes Patient Progress Patient progress: stable Procedures 12:33 PM 
Discussed pt, sx, hx and current findings with Meet Luciano DO. he is in agreement with plan. Will give analgesia, get xray and continue to monitor Anna Erazo. KIMMY Givens 
 
12:52 PM  
Updated pt and family on current sx. Will discharge with prednisone. . Lidoderm, and robaxin in place of flexeril. non acute xray. Pain improved Anna Godinezine.  KIMMY Givens 
 
LABORATORY TESTS: 
 No results found for this or any previous visit (from the past 12 hour(s)). IMAGING RESULTS: 
 
Xr Spine Lumb 2 Or 3 V Result Date: 1/22/2019 Indication: Back pain and right sciatica worsening over last 2 weeks Comparison to 11/16/2018 Three views of the lumbar spine demonstrate normal alignment without evidence of acute fracture or subluxation. Multilevel degenerative disc disease is again noted. Impression: No acute process. Lumbosacral degenerative disc disease again noted. MEDICATIONS GIVEN: 
Medications HYDROmorphone (PF) (DILAUDID) injection 2 mg (2 mg IntraMUSCular Given 1/22/19 1225) IMPRESSION: 
1. DDD (degenerative disc disease), lumbar 2. Right-sided low back pain with right-sided sciatica, unspecified chronicity PLAN: 
1. Current Discharge Medication List  
  
START taking these medications Details  
methocarbamol (ROBAXIN-750) 750 mg tablet Take 1 Tab by mouth four (4) times daily. Qty: 20 Tab, Refills: 0  
  
lidocaine (LIDODERM) 5 % Apply patch to the affected area for 12 hours a day and remove for 12 hours a day. Qty: 1 Each, Refills: 0  
  
predniSONE (DELTASONE) 20 mg tablet Take 60 mg by mouth daily for 5 days. Qty: 15 Tab, Refills: 0 CONTINUE these medications which have NOT CHANGED Details  
diclofenac (VOLTAREN) 1 % gel Apply  to affected area four (4) times daily. HYDROcodone-acetaminophen (NORCO) 5-325 mg per tablet Take 1 Tab by mouth every four (4) hours as needed for Pain for up to 12 doses. Max Daily Amount: 6 Tabs. Qty: 12 Tab, Refills: 0 Associated Diagnoses: Pain of left lower extremity  
  
acetaminophen (TYLENOL EXTRA STRENGTH) 500 mg tablet Take 1,000 mg by mouth every six (6) hours as needed for Pain.  
  
pravastatin (PRAVACHOL) 20 mg tablet TAKE 1 TABLET BY MOUTH NIGHTLY Qty: 30 Tab, Refills: 5  
  
celecoxib (CELEBREX) 200 mg capsule TAKE ONE CAPSULE BY MOUTH EVERY DAY Qty: 60 Cap, Refills: 5 estradiol (ESTRACE) 0.01 % (0.1 mg/gram) vaginal cream USE 1 GRAM VAGINALLY 3 TIMES WEEKLY Qty: 42.5 g, Refills: 3 cholecalciferol, vitamin D3, (VITAMIN D3) 2,000 unit tab Take  by mouth. DOCOSAHEXANOIC ACID/EPA (FISH OIL PO) Take  by mouth.  
  
coenzyme q10 (CO Q-10) 10 mg cap Take  by mouth. ASCORBATE CALCIUM (VITAMIN C PO) Take  by mouth. fluticasone (FLONASE) 50 mcg/actuation nasal spray USE 2 SPRAYS IN EACH NOSTRIL DAILY AS NEEDED FOR ALLERGIES Qty: 3 Bottle, Refills: 8  
  
loratadine (CLARITIN) 10 mg tablet Take 10 mg by mouth daily. MULTIVITAMIN (MULTIPLE VITAMIN PO) Take  by mouth.  
  
esomeprazole (NEXIUM) 40 mg capsule Take 1 capsule by mouth daily. Qty: 30 capsule, Refills: 5 STOP taking these medications  
  
 cyclobenzaprine (FLEXERIL) 5 mg tablet Comments:  
Reason for Stoppin.  
Follow-up Information Follow up With Specialties Details Why Contact Info Esther Owens MD Internal Medicine Schedule an appointment as soon as possible for a visit tomorrow as scheduled. [address] [place] [address] [address] [facility] [facility] 
[phone] Adalid Shen MD Neurosurgery Schedule an appointment as soon as possible for a visit 2-4 days for recheck. [address] [place] [address] 
[phone] Return to ED if worse 12:54 PM 
Pt has been reexamined. Pt has no new complaints, changes or physical findings. Care plan outlined and precautions discussed. All available results were reviewed with pt. All medications were reviewed with pt. All of pt's questions and concerns were addressed. Pt agrees to F/U as instructed and agrees to return to ED upon further deterioration. Pt is ready to go home.  
KESHA Malone

## 2019-01-23 ENCOUNTER — OFFICE VISIT (OUTPATIENT)
Dept: INTERNAL MEDICINE CLINIC | Age: 69
End: 2019-01-23

## 2019-01-23 ENCOUNTER — HOSPITAL ENCOUNTER (OUTPATIENT)
Dept: LAB | Age: 69
Discharge: HOME | End: 2019-01-23
Payer: MEDICARE

## 2019-01-23 VITALS
BODY MASS INDEX: 27.64 KG/M2 | TEMPERATURE: 98 F | OXYGEN SATURATION: 96 % | HEIGHT: 63 IN | DIASTOLIC BLOOD PRESSURE: 82 MMHG | WEIGHT: 156 LBS | SYSTOLIC BLOOD PRESSURE: 129 MMHG | HEART RATE: 100 BPM | RESPIRATION RATE: 18 BRPM

## 2019-01-23 DIAGNOSIS — M54.16 LEFT LUMBAR RADICULOPATHY: ICD-10-CM

## 2019-01-23 DIAGNOSIS — E55.9 VITAMIN D DEFICIENCY: ICD-10-CM

## 2019-01-23 DIAGNOSIS — Z98.890 S/P LUMBAR LAMINECTOMY: ICD-10-CM

## 2019-01-23 DIAGNOSIS — M79.605 PAIN OF LEFT LOWER EXTREMITY: ICD-10-CM

## 2019-01-23 DIAGNOSIS — M85.80 OSTEOPENIA, UNSPECIFIED LOCATION: Chronic | ICD-10-CM

## 2019-01-23 DIAGNOSIS — M54.16 ACUTE RIGHT LUMBAR RADICULOPATHY: Primary | ICD-10-CM

## 2019-01-23 DIAGNOSIS — E78.5 HYPERLIPIDEMIA, UNSPECIFIED HYPERLIPIDEMIA TYPE: ICD-10-CM

## 2019-01-23 DIAGNOSIS — M51.36 DDD (DEGENERATIVE DISC DISEASE), LUMBAR: ICD-10-CM

## 2019-01-23 PROCEDURE — 82306 VITAMIN D 25 HYDROXY: CPT

## 2019-01-23 PROCEDURE — 36415 COLL VENOUS BLD VENIPUNCTURE: CPT

## 2019-01-23 PROCEDURE — 80061 LIPID PANEL: CPT

## 2019-01-23 RX ORDER — CYCLOBENZAPRINE HCL 5 MG
TABLET ORAL
Refills: 2 | Status: ON HOLD | COMMUNITY
Start: 2019-01-10 | End: 2019-07-01

## 2019-01-23 RX ORDER — HYDROCODONE BITARTRATE AND ACETAMINOPHEN 5; 325 MG/1; MG/1
1 TABLET ORAL
Qty: 21 TAB | Refills: 0 | Status: SHIPPED | OUTPATIENT
Start: 2019-01-23 | End: 2019-02-08 | Stop reason: SDUPTHER

## 2019-01-23 NOTE — PROGRESS NOTES
HISTORY OF PRESENT ILLNESS Lemuel Portillo is a 70 y.o. female. HPI Low Back Pain: 
Lemuel Poritllo is a 70 y.o. female who complains of low back pain on the right for 3 week(s), is positional with bending or lifting, with radiation down the right leg. Milder radiation to  L leg. Severity of pain is 8 out of 10.  numbness, tingling, is present in right leg(s)/ foot. Reports mild weakness of leg. Precipitating factors: none recalled by the patient. Prior history of back problems: recurrent self limited episodes of low back pain in the past, previous osteoarthritis of lumbar spine, previous herniated disc and previous spinal surgery 4/6/17 Dr. Alvaro Juarez. The patient denies fevers, chills or sweats. The patient denies bowel/bladder incontinence and saddle numbness. Took medrol twice with partial relief. Was referred to back PT by Dr. Ryan Florez Went to ER yesterday for severe pain. Xray lumbar showed  normal alignment without evidence ofacute fracture or subluxation. Multilevel degenerative disc disease is again noted. Given robaxin, lidoderm in ER, also given Given dilaudid injection which helped. Taking prednisone 60 mg daily 5 days  
  
 
Dx L Veras's Cyst per ER 12/20/18. Was referred to ortho Dr. Ryan Florez. Seeing Dr. Vickie Glez soon in vascular to evaluate edema of the left leg. MRI 2/2017. IMPRESSION: 
1. The small right foramina mass at L1-2 does not enhance and has imaging 
characteristics most consistent with an extruded foraminal disc fragment. 2. Transitional lumbosacral segment numbering was changed for this dictation. Transitional segment is considered L5 for this dictation considering the patient 
has 12 rib bearing thoracic vertebra. 3. Disc bulging with minimal/mild stenosis L2-3, L3-4 and L4-5. Review of Systems Constitutional: Negative for chills, fever, malaise/fatigue and weight loss. HENT: Negative for hearing loss and tinnitus. Eyes: Negative for blurred vision. Respiratory: Negative for cough and shortness of breath. Cardiovascular: Negative for chest pain, palpitations and leg swelling. Gastrointestinal: Negative for abdominal pain, constipation and heartburn. Musculoskeletal: Positive for back pain. Negative for joint pain. Skin: Negative for rash. Neurological: Negative for dizziness, weakness and headaches. Psychiatric/Behavioral: Negative for depression. The patient does not have insomnia. All other systems reviewed and are negative. Physical Exam  
Constitutional: She is oriented to person, place, and time. She appears well-developed and well-nourished. No distress. Cardiovascular: Normal rate. Pulmonary/Chest: Effort normal.  
Musculoskeletal:  
     Lumbar back: She exhibits tenderness, bony tenderness and spasm. Positive straight leg raise on right at 30 degrees. Mild weakness with R hip flexion Neurological: She is alert and oriented to person, place, and time. Psychiatric: She has a normal mood and affect. Nursing note and vitals reviewed. ASSESSMENT and PLAN Diagnoses and all orders for this visit: 
 
1. Acute right lumbar radiculopathy Severe pain. Taking prednisone. Clearly has new pathology and likely central disc w right stenosis. Needs repeat MRI, then still see Dr. Naty Motley at [address] rx norco prn. Cont muscle relaxants Hx disc extrusion with atypical features, so needs contrast 
-     HYDROcodone-acetaminophen (NORCO) 5-325 mg per tablet; Take 1 Tab by mouth every eight (8) hours as needed for Pain. Max Daily Amount: 3 Tabs. -     MRI LUMB SPINE W WO CONT; Future 2. DDD (degenerative disc disease), lumbar 
-     HYDROcodone-acetaminophen (NORCO) 5-325 mg per tablet; Take 1 Tab by mouth every eight (8) hours as needed for Pain. Max Daily Amount: 3 Tabs. -     MRI LUMB SPINE W WO CONT; Future 3. Left lumbar radiculopathy -     HYDROcodone-acetaminophen (NORCO) 5-325 mg per tablet; Take 1 Tab by mouth every eight (8) hours as needed for Pain. Max Daily Amount: 3 Tabs. -     MRI LUMB SPINE W WO CONT; Future 4. S/P lumbar laminectomy 
-     HYDROcodone-acetaminophen (NORCO) 5-325 mg per tablet; Take 1 Tab by mouth every eight (8) hours as needed for Pain. Max Daily Amount: 3 Tabs. -     MRI LUMB SPINE W WO CONT; Future 5. Pain of left lower extremity 
-     HYDROcodone-acetaminophen (NORCO) 5-325 mg per tablet; Take 1 Tab by mouth every eight (8) hours as needed for Pain. Max Daily Amount: 3 Tabs. -     MRI LUMB SPINE W WO CONT; Future 6. Hyperlipidemia, unspecified hyperlipidemia type -     LIPID PANEL 
 
7. Osteopenia, unspecified location 8. Vitamin D deficiency 
-     VITAMIN D, 25 HYDROXY

## 2019-01-23 NOTE — PATIENT INSTRUCTIONS
Body Mass Index: Care Instructions Your Care Instructions Body mass index (BMI) can help you see if your weight is raising your risk for health problems. It uses a formula to compare how much you weigh with how tall you are. · A BMI lower than 18.5 is considered underweight. · A BMI between 18.5 and 24.9 is considered healthy. · A BMI between 25 and 29.9 is considered overweight. A BMI of 30 or higher is considered obese. If your BMI is in the normal range, it means that you have a lower risk for weight-related health problems. If your BMI is in the overweight or obese range, you may be at increased risk for weight-related health problems, such as high blood pressure, heart disease, stroke, arthritis or joint pain, and diabetes. If your BMI is in the underweight range, you may be at increased risk for health problems such as fatigue, lower protection (immunity) against illness, muscle loss, bone loss, hair loss, and hormone problems. BMI is just one measure of your risk for weight-related health problems. You may be at higher risk for health problems if you are not active, you eat an unhealthy diet, or you drink too much alcohol or use tobacco products. Follow-up care is a key part of your treatment and safety. Be sure to make and go to all appointments, and call your doctor if you are having problems. It's also a good idea to know your test results and keep a list of the medicines you take. How can you care for yourself at home? · Practice healthy eating habits. This includes eating plenty of fruits, vegetables, whole grains, lean protein, and low-fat dairy. · If your doctor recommends it, get more exercise. Walking is a good choice. Bit by bit, increase the amount you walk every day. Try for at least 30 minutes on most days of the week. · Do not smoke. Smoking can increase your risk for health problems.  If you need help quitting, talk to your doctor about stop-smoking programs and medicines. These can increase your chances of quitting for good. · Limit alcohol to 2 drinks a day for men and 1 drink a day for women. Too much alcohol can cause health problems. If you have a BMI higher than 25 · Your doctor may do other tests to check your risk for weight-related health problems. This may include measuring the distance around your waist. A waist measurement of more than 40 inches in men or 35 inches in women can increase the risk of weight-related health problems. · Talk with your doctor about steps you can take to stay healthy or improve your health. You may need to make lifestyle changes to lose weight and stay healthy, such as changing your diet and getting regular exercise. If you have a BMI lower than 18.5 · Your doctor may do other tests to check your risk for health problems. · Talk with your doctor about steps you can take to stay healthy or improve your health. You may need to make lifestyle changes to gain or maintain weight and stay healthy, such as getting more healthy foods in your diet and doing exercises to build muscle. Where can you learn more? Go to http://catrachito-chetan.info/. Enter S176 in the search box to learn more about \"Body Mass Index: Care Instructions. \" Current as of: October 13, 2016 Content Version: 11.4 © 4757-4756 [employer]. Care instructions adapted under license by [employer] (which disclaims liability or warranty for this information). If you have questions about a medical condition or this instruction, always ask your healthcare professional. [address] any warranty or liability for your use of this information.

## 2019-01-24 LAB
25(OH)D3+25(OH)D2 SERPL-MCNC: 27.5 NG/ML (ref 30–100)
CHOLEST SERPL-MCNC: 227 MG/DL (ref 100–199)
HDLC SERPL-MCNC: 91 MG/DL
INTERPRETATION, 910389: NORMAL
LDLC SERPL CALC-MCNC: 124 MG/DL (ref 0–99)
TRIGL SERPL-MCNC: 58 MG/DL (ref 0–149)
VLDLC SERPL CALC-MCNC: 12 MG/DL (ref 5–40)

## 2019-01-25 ENCOUNTER — HOSPITAL ENCOUNTER (OUTPATIENT)
Dept: MRI IMAGING | Age: 69
Discharge: HOME | End: 2019-01-25
Attending: INTERNAL MEDICINE
Payer: MEDICARE

## 2019-01-25 DIAGNOSIS — M79.605 PAIN OF LEFT LOWER EXTREMITY: ICD-10-CM

## 2019-01-25 DIAGNOSIS — M54.16 ACUTE RIGHT LUMBAR RADICULOPATHY: ICD-10-CM

## 2019-01-25 DIAGNOSIS — M54.16 LEFT LUMBAR RADICULOPATHY: ICD-10-CM

## 2019-01-25 DIAGNOSIS — M51.36 DDD (DEGENERATIVE DISC DISEASE), LUMBAR: ICD-10-CM

## 2019-01-25 DIAGNOSIS — Z98.890 S/P LUMBAR LAMINECTOMY: ICD-10-CM

## 2019-01-25 PROCEDURE — 72158 MRI LUMBAR SPINE W/O & W/DYE: CPT

## 2019-01-25 PROCEDURE — A9575 INJ GADOTERATE MEGLUMI 0.1ML: HCPCS | Performed by: RADIOLOGY

## 2019-01-25 PROCEDURE — 74011250636 HC RX REV CODE- 250/636: Performed by: RADIOLOGY

## 2019-01-25 RX ORDER — GADOTERATE MEGLUMINE 376.9 MG/ML
13 INJECTION INTRAVENOUS
Status: COMPLETED | OUTPATIENT
Start: 2019-01-25 | End: 2019-01-25

## 2019-01-25 RX ADMIN — GADOTERATE MEGLUMINE 13 ML: 376.9 INJECTION INTRAVENOUS at 19:23

## 2019-01-30 DIAGNOSIS — M25.551 RIGHT HIP PAIN: Primary | ICD-10-CM

## 2019-02-01 ENCOUNTER — HOSPITAL ENCOUNTER (OUTPATIENT)
Dept: GENERAL RADIOLOGY | Age: 69
Discharge: HOME | End: 2019-02-01
Payer: MEDICARE

## 2019-02-01 DIAGNOSIS — M25.551 RIGHT HIP PAIN: ICD-10-CM

## 2019-02-01 PROCEDURE — 73502 X-RAY EXAM HIP UNI 2-3 VIEWS: CPT

## 2019-02-02 RX ORDER — PREDNISONE 20 MG/1
TABLET ORAL
Qty: 18 TAB | Refills: 0 | Status: SHIPPED | OUTPATIENT
Start: 2019-02-02 | End: 2019-02-11

## 2019-02-03 DIAGNOSIS — M51.36 DDD (DEGENERATIVE DISC DISEASE), LUMBAR: Primary | ICD-10-CM

## 2019-02-03 DIAGNOSIS — M53.3 SACRAL PAIN: ICD-10-CM

## 2019-02-03 DIAGNOSIS — M54.17 LUMBOSACRAL RADICULOPATHY: ICD-10-CM

## 2019-02-03 DIAGNOSIS — M79.18 RIGHT BUTTOCK PAIN: ICD-10-CM

## 2019-02-04 ENCOUNTER — HOSPITAL ENCOUNTER (OUTPATIENT)
Dept: MRI IMAGING | Age: 69
Discharge: HOME | End: 2019-02-04
Attending: INTERNAL MEDICINE
Payer: MEDICARE

## 2019-02-04 VITALS — BODY MASS INDEX: 26.39 KG/M2 | WEIGHT: 149 LBS

## 2019-02-04 DIAGNOSIS — M54.17 LUMBOSACRAL RADICULOPATHY: ICD-10-CM

## 2019-02-04 DIAGNOSIS — M53.3 SACRAL PAIN: ICD-10-CM

## 2019-02-04 DIAGNOSIS — M79.18 RIGHT BUTTOCK PAIN: ICD-10-CM

## 2019-02-04 DIAGNOSIS — M51.36 DDD (DEGENERATIVE DISC DISEASE), LUMBAR: ICD-10-CM

## 2019-02-04 PROCEDURE — 72197 MRI PELVIS W/O & W/DYE: CPT

## 2019-02-04 PROCEDURE — 74011250636 HC RX REV CODE- 250/636: Performed by: RADIOLOGY

## 2019-02-04 PROCEDURE — A9575 INJ GADOTERATE MEGLUMI 0.1ML: HCPCS | Performed by: RADIOLOGY

## 2019-02-04 RX ORDER — GADOTERATE MEGLUMINE 376.9 MG/ML
13 INJECTION INTRAVENOUS
Status: DISCONTINUED | OUTPATIENT
Start: 2019-02-04 | End: 2019-02-04

## 2019-02-04 RX ORDER — GADOTERATE MEGLUMINE 376.9 MG/ML
13 INJECTION INTRAVENOUS
Status: COMPLETED | OUTPATIENT
Start: 2019-02-04 | End: 2019-02-04

## 2019-02-04 RX ADMIN — GADOTERATE MEGLUMINE 13 ML: 376.9 INJECTION INTRAVENOUS at 18:00

## 2019-02-08 ENCOUNTER — DOCUMENTATION ONLY (OUTPATIENT)
Dept: INTERNAL MEDICINE CLINIC | Age: 69
End: 2019-02-08

## 2019-02-08 DIAGNOSIS — Z98.890 S/P LUMBAR LAMINECTOMY: ICD-10-CM

## 2019-02-08 DIAGNOSIS — M79.605 PAIN OF LEFT LOWER EXTREMITY: ICD-10-CM

## 2019-02-08 DIAGNOSIS — M54.16 ACUTE RIGHT LUMBAR RADICULOPATHY: ICD-10-CM

## 2019-02-08 DIAGNOSIS — M54.16 LEFT LUMBAR RADICULOPATHY: ICD-10-CM

## 2019-02-08 DIAGNOSIS — M51.36 DDD (DEGENERATIVE DISC DISEASE), LUMBAR: ICD-10-CM

## 2019-02-08 RX ORDER — HYDROCODONE BITARTRATE AND ACETAMINOPHEN 5; 325 MG/1; MG/1
1 TABLET ORAL
Qty: 30 TAB | Refills: 0 | OUTPATIENT
Start: 2019-02-08

## 2019-02-08 RX ORDER — HYDROCODONE BITARTRATE AND ACETAMINOPHEN 5; 325 MG/1; MG/1
1 TABLET ORAL
Qty: 30 TAB | Refills: 0 | Status: ON HOLD | OUTPATIENT
Start: 2019-02-08 | End: 2019-07-01

## 2019-02-08 NOTE — TELEPHONE ENCOUNTER
Patient needs refill on Hydrocodone 5-325mg today. She has only 2 pills left. Please call to  when script is ready ([phone]). Thanks.

## 2019-02-11 ENCOUNTER — TELEPHONE (OUTPATIENT)
Dept: INTERNAL MEDICINE CLINIC | Age: 69
End: 2019-02-11

## 2019-02-11 NOTE — TELEPHONE ENCOUNTER
Pt calling and stated she wants Dr Mary Ellen Barraza to respond to her with results of her recent mri, [facility]

## 2019-03-02 RX ORDER — PRAVASTATIN SODIUM 20 MG/1
TABLET ORAL
Qty: 30 TAB | Refills: 5 | Status: SHIPPED | OUTPATIENT
Start: 2019-03-02 | End: 2019-06-26 | Stop reason: SDUPTHER

## 2019-05-07 DIAGNOSIS — I82.519 CHRONIC DEEP VEIN THROMBOSIS (DVT) OF FEMORAL VEIN, UNSPECIFIED LATERALITY (HCC): Primary | ICD-10-CM

## 2019-05-10 ENCOUNTER — HOSPITAL ENCOUNTER (OUTPATIENT)
Dept: LAB | Age: 69
Discharge: HOME | End: 2019-05-10
Payer: MEDICARE

## 2019-05-10 PROCEDURE — 36415 COLL VENOUS BLD VENIPUNCTURE: CPT

## 2019-05-10 PROCEDURE — 81240 F2 GENE: CPT

## 2019-05-20 LAB — F5 GENE MUT ANL BLD/T: NORMAL

## 2019-06-26 RX ORDER — PRAVASTATIN SODIUM 20 MG/1
TABLET ORAL
Qty: 90 TAB | Refills: 1 | Status: SHIPPED | OUTPATIENT
Start: 2019-06-26 | End: 2019-07-01

## 2019-06-30 ENCOUNTER — HOSPITAL ENCOUNTER (OUTPATIENT)
Age: 69
Setting detail: OBSERVATION
Discharge: HOME | End: 2019-07-01
Attending: EMERGENCY MEDICINE | Admitting: INTERNAL MEDICINE
Payer: MEDICARE

## 2019-06-30 ENCOUNTER — APPOINTMENT (OUTPATIENT)
Dept: CT IMAGING | Age: 69
End: 2019-06-30
Attending: EMERGENCY MEDICINE
Payer: MEDICARE

## 2019-06-30 ENCOUNTER — APPOINTMENT (OUTPATIENT)
Dept: GENERAL RADIOLOGY | Age: 69
End: 2019-06-30
Attending: EMERGENCY MEDICINE
Payer: MEDICARE

## 2019-06-30 DIAGNOSIS — R41.0 CONFUSION: Primary | ICD-10-CM

## 2019-06-30 DIAGNOSIS — H53.9 VISUAL DISTURBANCE: ICD-10-CM

## 2019-06-30 DIAGNOSIS — G45.9 TIA (TRANSIENT ISCHEMIC ATTACK): ICD-10-CM

## 2019-06-30 DIAGNOSIS — R47.01 APHASIA: ICD-10-CM

## 2019-06-30 PROBLEM — K42.9 UMBILICAL HERNIA WITHOUT OBSTRUCTION AND WITHOUT GANGRENE: Status: RESOLVED | Noted: 2018-10-25 | Resolved: 2019-06-30

## 2019-06-30 PROBLEM — R10.13 EPIGASTRIC PAIN: Status: RESOLVED | Noted: 2018-10-25 | Resolved: 2019-06-30

## 2019-06-30 PROBLEM — N28.89 LEFT RENAL MASS: Status: RESOLVED | Noted: 2018-10-25 | Resolved: 2019-06-30

## 2019-06-30 LAB
ANION GAP SERPL CALC-SCNC: 5 MMOL/L (ref 5–15)
BASOPHILS # BLD: 0 K/UL (ref 0–0.1)
BASOPHILS NFR BLD: 1 % (ref 0–1)
BUN SERPL-MCNC: 18 MG/DL (ref 6–20)
BUN/CREAT SERPL: 25 (ref 12–20)
CALCIUM SERPL-MCNC: 9.2 MG/DL (ref 8.5–10.1)
CHLORIDE SERPL-SCNC: 103 MMOL/L (ref 97–108)
CO2 SERPL-SCNC: 29 MMOL/L (ref 21–32)
CREAT SERPL-MCNC: 0.73 MG/DL (ref 0.55–1.02)
DIFFERENTIAL METHOD BLD: ABNORMAL
EOSINOPHIL # BLD: 0 K/UL (ref 0–0.4)
EOSINOPHIL NFR BLD: 0 % (ref 0–7)
ERYTHROCYTE [DISTWIDTH] IN BLOOD BY AUTOMATED COUNT: 13.7 % (ref 11.5–14.5)
ETHANOL SERPL-MCNC: <10 MG/DL
GLUCOSE BLD STRIP.AUTO-MCNC: 78 MG/DL (ref 65–100)
GLUCOSE SERPL-MCNC: 89 MG/DL (ref 65–100)
HCT VFR BLD AUTO: 40.6 % (ref 35–47)
HGB BLD-MCNC: 12.8 G/DL (ref 11.5–16)
IMM GRANULOCYTES # BLD AUTO: 0 K/UL (ref 0–0.04)
IMM GRANULOCYTES NFR BLD AUTO: 0 % (ref 0–0.5)
INR PPP: 1 (ref 0.9–1.1)
LYMPHOCYTES # BLD: 2.6 K/UL (ref 0.8–3.5)
LYMPHOCYTES NFR BLD: 38 % (ref 12–49)
MCH RBC QN AUTO: 30 PG (ref 26–34)
MCHC RBC AUTO-ENTMCNC: 31.5 G/DL (ref 30–36.5)
MCV RBC AUTO: 95.1 FL (ref 80–99)
MONOCYTES # BLD: 0.9 K/UL (ref 0–1)
MONOCYTES NFR BLD: 14 % (ref 5–13)
NEUTS SEG # BLD: 3.2 K/UL (ref 1.8–8)
NEUTS SEG NFR BLD: 47 % (ref 32–75)
NRBC # BLD: 0 K/UL (ref 0–0.01)
NRBC BLD-RTO: 0 PER 100 WBC
PLATELET # BLD AUTO: 212 K/UL (ref 150–400)
PMV BLD AUTO: 11.9 FL (ref 8.9–12.9)
POTASSIUM SERPL-SCNC: 4 MMOL/L (ref 3.5–5.1)
PROTHROMBIN TIME: 10.5 SEC (ref 9–11.1)
RBC # BLD AUTO: 4.27 M/UL (ref 3.8–5.2)
SERVICE CMNT-IMP: NORMAL
SODIUM SERPL-SCNC: 137 MMOL/L (ref 136–145)
WBC # BLD AUTO: 6.8 K/UL (ref 3.6–11)

## 2019-06-30 PROCEDURE — 80307 DRUG TEST PRSMV CHEM ANLYZR: CPT

## 2019-06-30 PROCEDURE — 74011250637 HC RX REV CODE- 250/637: Performed by: INTERNAL MEDICINE

## 2019-06-30 PROCEDURE — 85025 COMPLETE CBC W/AUTO DIFF WBC: CPT

## 2019-06-30 PROCEDURE — 99218 HC RM OBSERVATION: CPT

## 2019-06-30 PROCEDURE — 99285 EMERGENCY DEPT VISIT HI MDM: CPT

## 2019-06-30 PROCEDURE — 74011250636 HC RX REV CODE- 250/636: Performed by: INTERNAL MEDICINE

## 2019-06-30 PROCEDURE — 74011636320 HC RX REV CODE- 636/320: Performed by: STUDENT IN AN ORGANIZED HEALTH CARE EDUCATION/TRAINING PROGRAM

## 2019-06-30 PROCEDURE — 70496 CT ANGIOGRAPHY HEAD: CPT

## 2019-06-30 PROCEDURE — 82962 GLUCOSE BLOOD TEST: CPT

## 2019-06-30 PROCEDURE — 85610 PROTHROMBIN TIME: CPT

## 2019-06-30 PROCEDURE — 93005 ELECTROCARDIOGRAM TRACING: CPT

## 2019-06-30 PROCEDURE — 80048 BASIC METABOLIC PNL TOTAL CA: CPT

## 2019-06-30 PROCEDURE — 96372 THER/PROPH/DIAG INJ SC/IM: CPT

## 2019-06-30 PROCEDURE — 70450 CT HEAD/BRAIN W/O DYE: CPT

## 2019-06-30 PROCEDURE — 36415 COLL VENOUS BLD VENIPUNCTURE: CPT

## 2019-06-30 PROCEDURE — 71045 X-RAY EXAM CHEST 1 VIEW: CPT

## 2019-06-30 PROCEDURE — 80061 LIPID PANEL: CPT

## 2019-06-30 PROCEDURE — 83036 HEMOGLOBIN GLYCOSYLATED A1C: CPT

## 2019-06-30 RX ORDER — FLUTICASONE PROPIONATE 50 MCG
2 SPRAY, SUSPENSION (ML) NASAL DAILY
Status: DISCONTINUED | OUTPATIENT
Start: 2019-07-01 | End: 2019-07-01 | Stop reason: HOSPADM

## 2019-06-30 RX ORDER — ENOXAPARIN SODIUM 100 MG/ML
40 INJECTION SUBCUTANEOUS EVERY 24 HOURS
Status: DISCONTINUED | OUTPATIENT
Start: 2019-06-30 | End: 2019-07-01 | Stop reason: HOSPADM

## 2019-06-30 RX ORDER — PANTOPRAZOLE SODIUM 40 MG/1
40 TABLET, DELAYED RELEASE ORAL DAILY
Status: DISCONTINUED | OUTPATIENT
Start: 2019-07-01 | End: 2019-07-01 | Stop reason: HOSPADM

## 2019-06-30 RX ORDER — DIPHENHYDRAMINE HCL 25 MG
25 CAPSULE ORAL
Status: DISCONTINUED | OUTPATIENT
Start: 2019-06-30 | End: 2019-07-01 | Stop reason: HOSPADM

## 2019-06-30 RX ORDER — ACETAMINOPHEN 500 MG
1000 TABLET ORAL
Status: DISCONTINUED | OUTPATIENT
Start: 2019-06-30 | End: 2019-07-01 | Stop reason: HOSPADM

## 2019-06-30 RX ORDER — SODIUM CHLORIDE 0.9 % (FLUSH) 0.9 %
5-40 SYRINGE (ML) INJECTION EVERY 8 HOURS
Status: DISCONTINUED | OUTPATIENT
Start: 2019-06-30 | End: 2019-07-01 | Stop reason: HOSPADM

## 2019-06-30 RX ORDER — LIDOCAINE 4 G/100G
PATCH TOPICAL DAILY
Status: DISCONTINUED | OUTPATIENT
Start: 2019-07-01 | End: 2019-06-30

## 2019-06-30 RX ORDER — PRAVASTATIN SODIUM 20 MG/1
20 TABLET ORAL
Status: DISCONTINUED | OUTPATIENT
Start: 2019-06-30 | End: 2019-07-01

## 2019-06-30 RX ORDER — SODIUM CHLORIDE 0.9 % (FLUSH) 0.9 %
5-40 SYRINGE (ML) INJECTION AS NEEDED
Status: DISCONTINUED | OUTPATIENT
Start: 2019-06-30 | End: 2019-07-01 | Stop reason: HOSPADM

## 2019-06-30 RX ORDER — ONDANSETRON 2 MG/ML
4 INJECTION INTRAMUSCULAR; INTRAVENOUS
Status: DISCONTINUED | OUTPATIENT
Start: 2019-06-30 | End: 2019-07-01 | Stop reason: HOSPADM

## 2019-06-30 RX ORDER — GUAIFENESIN 100 MG/5ML
324 LIQUID (ML) ORAL
Status: COMPLETED | OUTPATIENT
Start: 2019-06-30 | End: 2019-06-30

## 2019-06-30 RX ADMIN — ACETAMINOPHEN 1000 MG: 500 TABLET ORAL at 23:55

## 2019-06-30 RX ADMIN — PRAVASTATIN SODIUM 20 MG: 20 TABLET ORAL at 23:55

## 2019-06-30 RX ADMIN — ENOXAPARIN SODIUM 40 MG: 40 INJECTION SUBCUTANEOUS at 23:55

## 2019-06-30 RX ADMIN — IOPAMIDOL 100 ML: 755 INJECTION, SOLUTION INTRAVENOUS at 22:09

## 2019-06-30 RX ADMIN — Medication 10 ML: at 22:41

## 2019-06-30 RX ADMIN — ASPIRIN 81 MG 324 MG: 81 TABLET ORAL at 22:53

## 2019-07-01 ENCOUNTER — APPOINTMENT (OUTPATIENT)
Dept: MRI IMAGING | Age: 69
End: 2019-07-01
Attending: INTERNAL MEDICINE
Payer: MEDICARE

## 2019-07-01 ENCOUNTER — APPOINTMENT (OUTPATIENT)
Dept: NON INVASIVE DIAGNOSTICS | Age: 69
End: 2019-07-01
Attending: INTERNAL MEDICINE
Payer: MEDICARE

## 2019-07-01 VITALS
RESPIRATION RATE: 18 BRPM | WEIGHT: 146 LBS | HEIGHT: 63 IN | DIASTOLIC BLOOD PRESSURE: 69 MMHG | TEMPERATURE: 98 F | BODY MASS INDEX: 25.87 KG/M2 | HEART RATE: 70 BPM | SYSTOLIC BLOOD PRESSURE: 114 MMHG | OXYGEN SATURATION: 96 %

## 2019-07-01 PROBLEM — G45.9 TIA (TRANSIENT ISCHEMIC ATTACK): Status: ACTIVE | Noted: 2019-07-01

## 2019-07-01 LAB
AMPHET UR QL SCN: NEGATIVE
APPEARANCE UR: CLEAR
ATRIAL RATE: 70 BPM
BACTERIA URNS QL MICRO: NEGATIVE /HPF
BARBITURATES UR QL SCN: NEGATIVE
BENZODIAZ UR QL: NEGATIVE
BILIRUB UR QL: NEGATIVE
CALCULATED P AXIS, ECG09: 16 DEGREES
CALCULATED R AXIS, ECG10: 13 DEGREES
CALCULATED T AXIS, ECG11: 13 DEGREES
CANNABINOIDS UR QL SCN: NEGATIVE
CHOLEST SERPL-MCNC: 203 MG/DL
COCAINE UR QL SCN: NEGATIVE
COLOR UR: ABNORMAL
DIAGNOSIS, 93000: NORMAL
DRUG SCRN COMMENT,DRGCM: NORMAL
ECHO AO ROOT DIAM: 3.05 CM
ECHO AV AREA PEAK VELOCITY: 2.8 CM2
ECHO AV AREA/BSA PEAK VELOCITY: 1.7 CM2/M2
ECHO AV PEAK GRADIENT: 6 MMHG
ECHO AV PEAK VELOCITY: 122.16 CM/S
ECHO LA MAJOR AXIS: 3.41 CM
ECHO LA TO AORTIC ROOT RATIO: 1.12
ECHO LA VOL 2C: 33.17 ML (ref 22–52)
ECHO LA VOL 4C: 46.5 ML (ref 22–52)
ECHO LA VOL BP: 42.55 ML (ref 22–52)
ECHO LA VOL/BSA BIPLANE: 25.15 ML/M2 (ref 16–28)
ECHO LA VOLUME INDEX A2C: 19.61 ML/M2 (ref 16–28)
ECHO LA VOLUME INDEX A4C: 27.49 ML/M2 (ref 16–28)
ECHO LV INTERNAL DIMENSION DIASTOLIC: 3.75 CM (ref 3.9–5.3)
ECHO LV INTERNAL DIMENSION SYSTOLIC: 2.27 CM
ECHO LV IVSD: 1.12 CM (ref 0.6–0.9)
ECHO LV MASS 2D: 157.2 G (ref 67–162)
ECHO LV MASS INDEX 2D: 92.9 G/M2 (ref 43–95)
ECHO LV POSTERIOR WALL DIASTOLIC: 1.14 CM (ref 0.6–0.9)
ECHO LVOT DIAM: 2.11 CM
ECHO LVOT PEAK GRADIENT: 4 MMHG
ECHO LVOT PEAK VELOCITY: 99.81 CM/S
ECHO MV A VELOCITY: 78.92 CM/S
ECHO MV E DECELERATION TIME (DT): 213.5 MS
ECHO MV E VELOCITY: 70.25 CM/S
ECHO MV E/A RATIO: 0.89
ECHO MV REGURGITANT PEAK GRADIENT: 49.8 MMHG
ECHO MV REGURGITANT PEAK VELOCITY: 352.91 CM/S
ECHO PV MAX VELOCITY: 81.79 CM/S
ECHO PV PEAK GRADIENT: 2.7 MMHG
ECHO RV INTERNAL DIMENSION: 3.54 CM
ECHO TV REGURGITANT MAX VELOCITY: 193.88 CM/S
ECHO TV REGURGITANT PEAK GRADIENT: 15 MMHG
EPITH CASTS URNS QL MICRO: ABNORMAL /LPF
EST. AVERAGE GLUCOSE BLD GHB EST-MCNC: 120 MG/DL
GLUCOSE UR STRIP.AUTO-MCNC: NEGATIVE MG/DL
HBA1C MFR BLD: 5.8 % (ref 4.2–6.3)
HDLC SERPL-MCNC: 82 MG/DL
HDLC SERPL: 2.5 {RATIO} (ref 0–5)
HGB UR QL STRIP: ABNORMAL
HYALINE CASTS URNS QL MICRO: ABNORMAL /LPF (ref 0–5)
KETONES UR QL STRIP.AUTO: 15 MG/DL
LDLC SERPL CALC-MCNC: 108.6 MG/DL (ref 0–100)
LEUKOCYTE ESTERASE UR QL STRIP.AUTO: NEGATIVE
LIPID PROFILE,FLP: ABNORMAL
METHADONE UR QL: NEGATIVE
NITRITE UR QL STRIP.AUTO: NEGATIVE
OPIATES UR QL: NEGATIVE
P-R INTERVAL, ECG05: 144 MS
PCP UR QL: NEGATIVE
PH UR STRIP: 7 [PH] (ref 5–8)
PROT UR STRIP-MCNC: NEGATIVE MG/DL
Q-T INTERVAL, ECG07: 408 MS
QRS DURATION, ECG06: 76 MS
QTC CALCULATION (BEZET), ECG08: 440 MS
RBC #/AREA URNS HPF: ABNORMAL /HPF (ref 0–5)
SP GR UR REFRACTOMETRY: 1 (ref 1–1.03)
TRIGL SERPL-MCNC: 62 MG/DL (ref ?–150)
UROBILINOGEN UR QL STRIP.AUTO: 1 EU/DL (ref 0.2–1)
VENTRICULAR RATE, ECG03: 70 BPM
VLDLC SERPL CALC-MCNC: 12.4 MG/DL
WBC URNS QL MICRO: ABNORMAL /HPF (ref 0–4)

## 2019-07-01 PROCEDURE — 74011250637 HC RX REV CODE- 250/637: Performed by: INTERNAL MEDICINE

## 2019-07-01 PROCEDURE — 97165 OT EVAL LOW COMPLEX 30 MIN: CPT

## 2019-07-01 PROCEDURE — 87086 URINE CULTURE/COLONY COUNT: CPT

## 2019-07-01 PROCEDURE — 74011250637 HC RX REV CODE- 250/637: Performed by: NURSE PRACTITIONER

## 2019-07-01 PROCEDURE — 81001 URINALYSIS AUTO W/SCOPE: CPT

## 2019-07-01 PROCEDURE — 80307 DRUG TEST PRSMV CHEM ANLYZR: CPT

## 2019-07-01 PROCEDURE — 99218 HC RM OBSERVATION: CPT

## 2019-07-01 PROCEDURE — 93306 TTE W/DOPPLER COMPLETE: CPT

## 2019-07-01 PROCEDURE — 70551 MRI BRAIN STEM W/O DYE: CPT

## 2019-07-01 PROCEDURE — 97161 PT EVAL LOW COMPLEX 20 MIN: CPT

## 2019-07-01 RX ORDER — LORATADINE 10 MG/1
10 TABLET ORAL DAILY
Status: DISCONTINUED | OUTPATIENT
Start: 2019-07-01 | End: 2019-07-01 | Stop reason: HOSPADM

## 2019-07-01 RX ORDER — PRAVASTATIN SODIUM 40 MG/1
40 TABLET ORAL
Qty: 30 TAB | Refills: 1 | Status: SHIPPED | OUTPATIENT
Start: 2019-07-01 | End: 2019-09-16 | Stop reason: SDUPTHER

## 2019-07-01 RX ORDER — GUAIFENESIN 100 MG/5ML
81 LIQUID (ML) ORAL DAILY
Status: DISCONTINUED | OUTPATIENT
Start: 2019-07-01 | End: 2019-07-01 | Stop reason: HOSPADM

## 2019-07-01 RX ORDER — ASPIRIN 81 MG/1
81 TABLET ORAL DAILY
Qty: 30 TAB | Refills: 1 | Status: SHIPPED | OUTPATIENT
Start: 2019-07-01 | End: 2019-09-09 | Stop reason: ALTCHOICE

## 2019-07-01 RX ORDER — PRAVASTATIN SODIUM 20 MG/1
40 TABLET ORAL
Status: DISCONTINUED | OUTPATIENT
Start: 2019-07-01 | End: 2019-07-01 | Stop reason: HOSPADM

## 2019-07-01 RX ORDER — ATORVASTATIN CALCIUM 20 MG/1
40 TABLET, FILM COATED ORAL
Status: DISCONTINUED | OUTPATIENT
Start: 2019-07-01 | End: 2019-07-01

## 2019-07-01 RX ORDER — FLUTICASONE PROPIONATE 50 MCG
2 SPRAY, SUSPENSION (ML) NASAL DAILY
COMMUNITY

## 2019-07-01 RX ADMIN — Medication 10 ML: at 13:46

## 2019-07-01 RX ADMIN — ASPIRIN 81 MG 81 MG: 81 TABLET ORAL at 09:33

## 2019-07-01 RX ADMIN — FLUTICASONE PROPIONATE 2 SPRAY: 50 SPRAY, METERED NASAL at 09:36

## 2019-07-01 RX ADMIN — LORATADINE 10 MG: 10 TABLET ORAL at 11:10

## 2019-07-01 NOTE — H&P
[facility]    Hospitalist Admission Note      NAME:  Yandel Mccarty   :   1950   MRN:  [id number]     PCP:  Anand Singh MD     Date/Time:  2019 10:46 PM          Subjective:     CHIEF COMPLAINT: confusion     HISTORY OF PRESENT ILLNESS:     Ms. Humberto Thacker is a 70 y.o.  female who presented to the Emergency Department complaining of confusion. It occurred tonight while she was reading. She could not feel like she was understanding what she was reading. She also felt there was a brighter area on the book page, but that its location moved around. She could not remember her best friend. No other symptoms. No speech changes, no weakness. ER workup was unremarkable. We were asked to admit her for TIA workup. Past Medical History:   Diagnosis Date    Angiomyolipoma of right kidney     3.3 cm upper pole 11/8/15.  AR (allergic rhinitis)     DDD (degenerative disc disease), lumbar     DrAsad [employer]. L3-L4, L4-L5 injections . radiates to left knee. MRI 10/2015 mild DJD, no clear stenosis    GERD (gastroesophageal reflux disease)     Gluten intolerance     Hyperlipidemia 2009    Osteopenia 2014    mild 14 T-1.6 L fem neck    Raynaud phenomenon     SCC (squamous cell carcinoma)     right forearm. Dr. Lesly Marshall        Past Surgical History:   Procedure Laterality Date    HX BREAST REDUCTION      HX  SECTION      2    HX COLONOSCOPY  2014    2 small polyps.   Dr Yahaira Castro HX GI      perianal cyst    HX HEENT      face lift    HX KNEE ARTHROSCOPY      L knee - Zaslov    HX KNEE REPLACEMENT Left 2012    HX LUMBAR LAMINECTOMY  2017    L1-L2 Dr Griselda Agarwal    HX ORTHOPAEDIC      L shoulder, L1, L2 laminectomy    HX ORTHOPAEDIC      L ankle surgery    HX FAN AND BSO      HX TONSILLECTOMY      VASCULAR SURGERY PROCEDURE UNLIST      venous stripping R       Social History     Tobacco Use    Smoking status: Never Smoker    Smokeless tobacco: Never Used   Substance Use Topics    Alcohol use: Yes     Alcohol/week: 1.0 oz     Types: 2 Glasses of wine per week        Family History   Problem Relation Age of Onset    Heart Failure Mother     Hypertension Mother     Stroke Mother     High Cholesterol Mother     Thyroid Disease Mother     Rashes/Skin Problems Mother     Cancer Mother         uterine    Heart Disease Mother         premature    Heart Attack Mother     Hypertension Father     Heart Failure Father     High Cholesterol Father     Dementia Father     Heart Disease Father     Stroke Father     Other Sister         deep venous thromboses and Pulmonary embolus    Cancer Paternal Aunt         colon    Diabetes Paternal Grandmother     Cancer Other         colon      Allergies   Allergen Reactions    Alfentanil Shortness of Breath    Gluten Other (comments)     Bloating, nausea, patient states that this can be removed from allergy list    Levaquin [Levofloxacin] Nausea Only    Morphine Hives, Rash and Itching    Percocet [Oxycodone-Acetaminophen] Itching    Tramadol Nausea Only        Prior to Admission medications    Medication Sig Start Date End Date Taking? Authorizing Provider   pravastatin (PRAVACHOL) 20 mg tablet TAKE 1 TABLET BY MOUTH NIGHTLY 6/26/19   Nikky Wells MD   HYDROcodone-acetaminophen [facility]) 5-325 mg per tablet Take 1 Tab by mouth every eight (8) hours as needed for Pain. Max Daily Amount: 3 Tabs. 2/8/19   Nikky Wells MD   cyclobenzaprine (FLEXERIL) 5 mg tablet TAKE 1 TABLET BY MOUTH THREE TIMES A DAY AS NEEDED FOR MUSCLE SPASMS 1/10/19   ProviderGovind   diclofenac (VOLTAREN) 1 % gel Apply  to affected area four (4) times daily. Other, MD Bong   methocarbamol (ROBAXIN-750) 750 mg tablet Take 1 Tab by mouth four (4) times daily. 1/22/19   Mariela Givens PA   lidocaine (LIDODERM) 5 % Apply patch to the affected area for 12 hours a day and remove for 12 hours a day.  1/22/19 KESHA Bhatti   acetaminophen (TYLENOL EXTRA STRENGTH) 500 mg tablet Take 1,000 mg by mouth every six (6) hours as needed for Pain. Other, MD Bong   celecoxib (CELEBREX) 200 mg capsule TAKE ONE CAPSULE BY MOUTH EVERY DAY 7/16/18   Ferdinand Wells MD   estradiol (ESTRACE) 0.01 % (0.1 mg/gram) vaginal cream USE 1 GRAM VAGINALLY 3 TIMES WEEKLY 9/8/15   Fedrinand Wells MD   cholecalciferol, vitamin D3, (VITAMIN D3) 2,000 unit tab Take  by mouth. Provider, Historical   DOCOSAHEXANOIC ACID/EPA (FISH OIL PO) Take  by mouth. Provider, Historical   coenzyme q10 (CO Q-10) 10 mg cap Take  by mouth. Provider, Historical   ASCORBATE CALCIUM (VITAMIN C PO) Take  by mouth. Provider, Historical   esomeprazole (NEXIUM) 40 mg capsule Take 1 capsule by mouth daily. 11/4/14   Ferdinand Wells MD   fluticasone [facility]) 50 mcg/actuation nasal spray USE 2 SPRAYS IN EACH NOSTRIL DAILY AS NEEDED FOR ALLERGIES 4/29/14   Ferdinand Wells MD   loratadine (CLARITIN) 10 mg tablet Take 10 mg by mouth daily. Provider, Historical   MULTIVITAMIN (MULTIPLE VITAMIN PO) Take  by mouth.     Provider, Historical       Review of Systems:  (bold if positive, if negative)    Gen:  Eyes:  Visual changesENT:  CVS:  Pulm:  GI:  :  MS:  Skin:  Psych:  Endo:  Hem:  Renal:  Neuro:        Objective:      VITALS:    Vital signs reviewed; most recent are:    Visit Vitals  /82 (BP 1 Location: Left arm, BP Patient Position: At rest)   Pulse 69   Temp 97.7 °F (36.5 °C)   Resp 18   Ht 5' 3\" (1.6 m)   Wt 66.5 kg (146 lb 9.7 oz)   SpO2 99%   BMI 25.97 kg/m²     SpO2 Readings from Last 6 Encounters:   06/30/19 99%   01/23/19 96%   01/22/19 94%   12/21/18 95%   12/20/18 98%   12/20/18 98%        No intake or output data in the 24 hours ending 06/30/19 4367     Exam:     Physical Exam:    Gen:  Well-developed, well-nourished, in no acute distress  HEENT:  Pink conjunctivae, PERRL, hearing intact to voice, moist mucous membranes  Neck:  Supple, without masses, thyroid non-tender  Resp:  No accessory muscle use, clear breath sounds without wheezes rales or rhonchi  Card:  No murmurs, normal S1, S2 without thrills, bruits or peripheral edema  Abd:  Soft, non-tender, non-distended, normoactive bowel sounds are present, no mass  Lymph:  No cervical or inguinal adenopathy  Musc:  No cyanosis or clubbing  Skin:  No rashes or ulcers, skin turgor is good  Neuro:  Cranial nerves are grossly intact, no focal motor weakness, follows commands appropriately  Psych:  Good insight, oriented to person, place and time, alert     Labs:    Recent Labs     06/30/19 2118   WBC 6.8   HGB 12.8   HCT 40.6        Recent Labs     06/30/19 2118      K 4.0      CO2 29   GLU 89   BUN 18   CREA 0.73   CA 9.2     Lab Results   Component Value Date/Time    Glucose (POC) 78 06/30/2019 08:57 PM     No results for input(s): PH, PCO2, PO2, HCO3, FIO2 in the last 72 hours. Recent Labs     06/30/19 2118   INR 1.0     All Micro Results     Procedure Component Value Units Date/Time    CULTURE, URINE [[id number]]     Order Status:  Sent Specimen:  Urine from Clean catch           I have reviewed previous records       Assessment and Plan:      Confusion / Visual disturbance - POA, unclear etiology. TIA vs atypical migraine vs other. Fully resolved prior to presentation. Admit to tele. Neuro consult. MRI and ECHO. Check A1c, lipids. No current PT/OT/speech needs. DDD (degenerative disc disease), lumbar - Has not required any pain meds after recovery from surgery in April. Tylenol prn. Hyperlipidemia - Monitored and treated by PCP. Continue pravastatin and check panel. GERD (gastroesophageal reflux disease) / Gluten intolerance - PPI. Raynaud phenomenon - Supportive care.     AR (allergic rhinitis) - Flonase     Telemetry reviewed:   normal sinus rhythm    Risk of deterioration: high      Total time spent with patient: 79 Minutes I reviewed chart, notes, data and current medications in the medical record. I have examined and treated the patient at bedside during this period.                  Care Plan discussed with: Patient, Family, Nursing Staff and >50% of time spent in counseling and coordination of care    Discussed:  Care Plan       ___________________________________________________    Attending Physician: Lora Reddy MD

## 2019-07-01 NOTE — ED NOTES
Notified 5th floor charge nurse of room assignment, nurse to call back for report     Relayed message to primary RN Isa Montaño, RN   ED Charge Nurse

## 2019-07-01 NOTE — PROGRESS NOTES
Occupational Therapy EVALUATION/discharge  Patient: Hayde Vyas (50 y.o. female)  Date: 2019  Primary Diagnosis: Aphasia [R47.01]       Precautions:        ASSESSMENT:   Based on the objective data described below, the patient presents with hospital admission after one episode of difficulty understanding printed text, and inability to recall names of close persons. Patient awaiting MRI and CT showing possible small meningioma. Patient with resolved symptoms and reports only tingling/numbness to R foot/toes following back surgery in March. Patient demonstrates independence with ADL tasks, no LOB with ambulation and scores 66/66 on Fugl MeyerUE motor assessment, indicating no UE motor deficits at this time. Further skilled acute occupational therapy is not indicated at this time. Discharge Recommendations: None  Further Equipment Recommendations for Discharge: none noted       SUBJECTIVE:   Patient stated I am doing fine now.     OBJECTIVE DATA SUMMARY:   HISTORY:   Past Medical History:   Diagnosis Date    Angiomyolipoma of right kidney     3.3 cm upper pole 11/8/15.  AR (allergic rhinitis)     DDD (degenerative disc disease), lumbar     Dr. Zurdo Gunter. L3-L4, L4-L5 injections . radiates to left knee. MRI 10/2015 mild DJD, no clear stenosis    GERD (gastroesophageal reflux disease)     Gluten intolerance     Hyperlipidemia 2009    Osteopenia 2014    mild 14 T-1.6 L fem neck    Raynaud phenomenon     SCC (squamous cell carcinoma)     right forearm. Dr. Priya Booker TIA (transient ischemic attack) 2019     Past Surgical History:   Procedure Laterality Date    HX BREAST REDUCTION      HX  SECTION      2    HX COLONOSCOPY  2014    2 small polyps.   Dr Brit Mack HX GI      perianal cyst    HX HEENT      face lift    HX KNEE ARTHROSCOPY      L knee - Zaslov    HX KNEE REPLACEMENT Left 2012    HX LUMBAR LAMINECTOMY  2017    L1-L2 Dr Nicolas Rivera  HX ORTHOPAEDIC      L shoulder, L1, L2 laminectomy    HX ORTHOPAEDIC      L ankle surgery    HX FAN AND BSO      HX TONSILLECTOMY      VASCULAR SURGERY PROCEDURE UNLIST      venous stripping R       Prior Level of Function/Environment/Context: independent  Occupations in which the patient is/was successful, what are the barriers preventing that success:   Performance Patterns (routines, roles, habits, and rituals):   Personal Interests and/or values:   Expanded or extensive additional review of patient history:     Home Situation  Home Environment: Private residence  # Steps to Enter: 4  Rails to Enter: Yes  Hand Rails : Bilateral  One/Two Story Residence: Two story, live on 1st floor  Living Alone: No  Support Systems: Spouse/Significant Other/Partner  Patient Expects to be Discharged to[de-identified] Private residence  Tub or Shower Type: Shower    Hand dominance: Right    EXAMINATION OF PERFORMANCE DEFICITS:  Cognitive/Behavioral Status:  Neurologic State: Alert;Eyes open spontaneously  Orientation Level: Oriented X4  Cognition: Appropriate decision making; Appropriate for age attention/concentration; Appropriate safety awareness             Skin: intact as seen    Edema: none noted     Hearing: Auditory  Auditory Impairment: None    Vision/Perceptual:                                     Range of Motion:  AROM: Within functional limits  PROM: Within functional limits                      Strength:  Strength:  Within functional limits                Coordination:  Coordination: Within functional limits            Tone & Sensation:    Tone: Normal  Sensation: Impaired(R foot baseline)                      Balance:  Sitting: Intact  Standing: Intact    Functional Mobility and Transfers for ADLs:  Bed Mobility:  Rolling: Independent  Supine to Sit: Independent  Sit to Supine: Independent    Transfers:  Sit to Stand: Independent  Stand to Sit: Independent  Bathroom Mobility: Independent  Toilet Transfer : Independent    ADL Assessment:  Feeding: Independent    Oral Facial Hygiene/Grooming: Independent    Bathing: Independent    Upper Body Dressing: Independent    Lower Body Dressing: Independent    Toileting: Independent                ADL Intervention and task modifications:             Patient educated on BEFAST protocol and verbalized understanding as evidenced by verbal confirmation. Therapeutic Exercise:     Functional Measure:  Fugl-Varghese Assessment of Motor Recovery after Stroke:     Reflex Activity  Flexors/Biceps/Fingers: Can be elicited  Extensors/Triceps: Can be elicited  Reflex Subtotal: 4    Volitional Movement Within Synergies  Shoulder Retraction: Full  Shoulder Elevation: Full  Shoulder Abduction (90 degrees): Full  Shoulder External Rotation: Full  Elbow Flexion: Full  Forearm Supination: Full  Shoulder Adduction/Internal Rotation: Full  Elbow Extension: Full  Forearm Pronation: Full  Subtotal: 18    Volitional Movement Mixing Synergies  Hand to Lumbar Spine: Full  Shoulder Flexion (0-90 degrees): Full  Pronation-Supination: Full  Subtotal: 6    Volitional Movement With Little or No Synergy  Shoulder Abduction (0-90 degrees): Full  Shoulder Flexion ( degrees): Full  Pronation/Supination: Full  Subtotal : 6    Normal Reflex Activity  Biceps, Triceps, Finger Flexors:  Full  Subtotal : 2    Upper Extremity Total   Upper Extremity Total: 36    Wrist  Stability at 15 Degree Dorsiflexion: Full  Repeated Dorsiflexion/ Volar Flexion: Full  Stability at 15 Degree Dorsiflexion: Full  Repeated Dorsiflexion/ Volar Flexion: Full  Circumduction: Full  Wrist Total: 10    Hand  Mass Flexion: Full  Mass Extension: Full  Grasp A: Full  Grasp B: Full  Grasp C: Full  Grasp D: Full  Grasp E: Full  Hand Total: 14    Coordination/Speed  Tremor: None  Dysmetria: None  Time: <1s  Coordination/Speed Total : 6    Total A-D  Total A-D (Motor Function): 66/66         This is a reliable/valid measure of arm function after a neurological event. It has established value to characterize functional status and for measuring spontaneous and therapy-induced recovery; tests proximal and distal motor functions. Fugl-Varghese Assessment  UE scores recorded between five and 30 days post neurologic event can be used to predict UE recovery at six months post neurologic event. Severe = 0-21 points   Moderately Severe = 22-33 points   Moderate = 34-47 points   Mild = 48-66 points  CAT Rios, YANDEL Adhikari, & ALMA Donaldson (1992). Measurement of motor recovery after stroke: Outcome assessment and sample size requirements.  Stroke, 23, pp. 5253-4630.   --------------------------------------------------------------------------------------------------------------------------------------------------------------------  MCID:  Stroke:   Darleen Connor et al, 2001; n = 171; mean age 79 (6) years; assessed within 16 (12) days of stroke, Acute Stroke)  FMA Motor Scores from Admission to Discharge   10 point increase in FMA Upper Extremity = 1.5 change in discharge FIM   10 point increase in FMA Lower Extremity = 1.9 change in discharge FIM  MDC:   Stroke:   Curtis Goltz et al, 2008, n = 14, mean age = 59.9 (14.6) years, assessed on average 14 (6.5) months post stroke, Chronic Stroke)   FMA = 5.2 points for the Upper Extremity portion of the assessment         Occupational Therapy Evaluation Charge Determination   History Examination Decision-Making   LOW Complexity : Brief history review  LOW Complexity : 1-3 performance deficits relating to physical, cognitive , or psychosocial skils that result in activity limitations and / or participation restrictions  LOW Complexity : No comorbidities that affect functional and no verbal or physical assistance needed to complete eval tasks       Based on the above components, the patient evaluation is determined to be of the following complexity level: LOW   Pain:  Pain Scale 1: Numeric (0 - 10)  Pain Intensity 1: 0              Activity Tolerance:   VSS  Please refer to the flowsheet for vital signs taken during this treatment. After treatment:   []  Patient left in no apparent distress sitting up in chair  [x]  Patient left in no apparent distress in bed  [x]  Call bell left within reach  [x]  Nursing notified  []  Caregiver present  []  Bed alarm activated    COMMUNICATION/EDUCATION:   Communication/Collaboration:  [x]      Home safety education was provided and the patient/caregiver indicated understanding. [x]      Patient/family have participated as able and agree with findings and recommendations. []      Patient is unable to participate in plan of care at this time.   Findings and recommendations were discussed with: Physical Therapist and Registered Nurse    VLADIMIR Amin/L  Time Calculation: 17 mins

## 2019-07-01 NOTE — PROGRESS NOTES
CM Note:  PTA pt was independent with ADL's, was driving and walked unaided. Gets her medications from [facility] [address] [place]. Reason for Admission:   aphasia                   RRAT Score:    5                 Plan for utilizing home health:      none                    Current Advanced Directive/Advance Care Plan: yes                         Transition of Care Plan:                    1. Pt to d/c to home after MRI  2.  to drive her home  CHAN Luz RN    Care Management Interventions  PCP Verified by CM: Yes(Dr. Ganesh Rojas. NN notified.)  Mode of Transport at Discharge:  Other (see comment)()  Transition of Care Consult (CM Consult): Discharge Planning  MyChart Signup: No  Discharge Durable Medical Equipment: No  Physical Therapy Consult: Yes  Occupational Therapy Consult: Yes  Speech Therapy Consult: Yes  Current Support Network: Lives with Spouse, Own Home(1 level house with 4 entry steps.)  Plan discussed with Pt/Family/Caregiver: Yes  Discharge Location  Discharge Placement: Home with one level

## 2019-07-01 NOTE — PROGRESS NOTES
Received speech consult. Patient currently NOT aphasic. However, she describes episode of word-finding issues and reading comprehension issues in addition to possible vision field cut. Reviewed BEFAST symptoms. No SLP needed at this time, but awaiting MRI for etiology/dx.

## 2019-07-01 NOTE — PROGRESS NOTES
0720- Bedside and Verbal shift change report given to [facility] (oncoming nurse) by Hermelinda Kapoor (offgoing nurse). Report included the following information SBAR, Kardex, Intake/Output, MAR and Recent Results. Pt observed in bed, sleeping, on room air, no signs of any pain or discomfort at this time, call bell within reach, will assess. 8701- Dr. Edvin Guidry and Jazmin Richardson in room assessing patient. 2791- Discharge orders placed by Dr. Malcom Hogue pending clearance from Neuro. Shayy NP updated and stated patient is cleared by neuro for discharge. Will update patient. 1520- I have reviewed discharge instructions with the patient and spouse. The patient and spouse verbalized understanding. Pt in own clothing, PIV removed, tele disconnected, denies any pain at this time. Pt  is present for discharge assistance. All questions answered to patient satisfaction. Wheelchair assistance requested.

## 2019-07-01 NOTE — PROGRESS NOTES
CM Note:  Attempted to meet with pt for d/c planning. She was being seen by a physician. Will return later.   OLVIN Griffin

## 2019-07-01 NOTE — PROGRESS NOTES
[facility]: MED RECONCILIATION    Comments/Recommendations:   Patient is awake, alert, and knowledgeable about home medications   Verifies allergies and preferred pharmacy listed. Reports compliance to prescribed regimen      Medications added:     Compounded HRT cream    Medications removed:    Cyclobenzaprine  Hydrocodone/apap  Lidocaine  Methocarbamol  Estradiol    Medications adjusted: Added dose and frequency to vitamin D, CoQ10    Allergies: Alfentanil; Levaquin [levofloxacin]; Morphine; Percocet [oxycodone-acetaminophen]; and Tramadol    Prior to Admission Medications:     Prior to Admission Medications   Prescriptions Last Dose Informant Patient Reported? Taking? ASCORBATE CALCIUM (VITAMIN C PO) 6/30/2019 at Unknown time Self Yes Yes   Sig: Take 1 Tab by mouth daily. DOCOSAHEXANOIC ACID/EPA (FISH OIL PO) 6/30/2019 at Unknown time Self Yes Yes   Sig: Take 1 Cap by mouth daily. MULTIVITAMIN (MULTIPLE VITAMIN PO) 6/30/2019 at Unknown time Self Yes Yes   Sig: Take 1 Tab by mouth daily. OTHER,NON-FORMULARY, 6/28/2019 Self Yes Yes   Sig: Apply 1 Applicator to affected area every Monday, Wednesday, Friday. Compounded HRT cream from [facility]   acetaminophen (TYLENOL EXTRA STRENGTH) 500 mg tablet 6/30/2019 at Unknown time Self Yes Yes   Sig: Take 1,000 mg by mouth every six (6) hours as needed for Pain. celecoxib (CELEBREX) 200 mg capsule 6/30/2019 at 0900 Self No Yes   Sig: TAKE ONE CAPSULE BY MOUTH EVERY DAY   cholecalciferol (VITAMIN D3) 400 unit tab tablet 6/30/2019 at 0900 Self Yes Yes   Sig: Take 1 Tab by mouth daily. coenzyme Q-10 (CO Q-10) 200 mg capsule 6/30/2019 at Unknown time Self Yes Yes   Sig: Take 1 Cap by mouth daily. diclofenac (VOLTAREN) 1 % gel  Self Yes Yes   Sig: Apply 2 g to affected area four (4) times daily as needed (pain). esomeprazole (NEXIUM) 40 mg capsule 6/30/2019 at 0900 Self No Yes   Sig: Take 1 capsule by mouth daily.    fluticasone propionate [facility] ALLERGY RELIEF) 50 mcg/actuation nasal spray 2019 at Unknown time Self Yes Yes   Si Sprays by Both Nostrils route daily. loratadine (CLARITIN) 10 mg tablet 2019 at 0900 Self Yes Yes   Sig: Take 10 mg by mouth daily.    pravastatin (PRAVACHOL) 20 mg tablet 2019 at 2300 Self No Yes   Sig: TAKE 1 TABLET BY MOUTH NIGHTLY      Facility-Administered Medications: None      Thank you,      Florentin Selby, PharmD, BCPS

## 2019-07-01 NOTE — ED NOTES
TRANSFER - OUT REPORT:    Verbal report given to [facility], RN(name) on Danilo Berry  being transferred to [facility](unit) for routine progression of care       Report consisted of patients Situation, Background, Assessment and   Recommendations(SBAR). Information from the following report(s) SBAR, Kardex, ED Summary, MAR, Recent Results and Cardiac Rhythm NSR was reviewed with the receiving nurse. Lines:   Peripheral IV 06/30/19 Left Antecubital (Active)   Site Assessment Clean, dry, & intact 6/30/2019  9:19 PM   Phlebitis Assessment 0 6/30/2019  9:19 PM   Infiltration Assessment 0 6/30/2019  9:19 PM   Dressing Status Clean, dry, & intact 6/30/2019  9:19 PM   Dressing Type Transparent 6/30/2019  9:19 PM   Hub Color/Line Status Pink 6/30/2019  9:19 PM        Opportunity for questions and clarification was provided.       Patient transported with:   Monitor  Registered Nurse

## 2019-07-01 NOTE — DISCHARGE INSTRUCTIONS
HOSPITALIST DISCHARGE INSTRUCTIONS  NAME: Kevin De La Cruz   :  1950   MRN:  [id number]     Date/Time:  2019 12:00 PM    ADMIT DATE: 2019     DISCHARGE DATE: 2019     ADMITTING DIAGNOSIS:  Confusion, blurry vision    DISCHARGE DIAGNOSIS:  TIA    MEDICATIONS:  See after visit summary       · It is important that you take the medication exactly as they are prescribed. · Keep your medication in the bottles provided by the pharmacist and keep a list of the medication names, dosages, and times to be taken in your wallet. · Do not take other medications without consulting your doctor     Pain Management: per above medications    What to do at Home    Recommended diet:  Low fat, Low cholesterol    Recommended activity: Activity as tolerated    1) Return to the hospital if you feel worse    2) If you experience any of the following symptoms then please call your primary care physician or return to the emergency room if you cannot get hold of your doctor:  Fever, chills, nausea, vomiting, diarrhea, change in mentation, falling, bleeding, shortness of breath, chest pain, severe headache, severe abdominal pain,     3) Take baby aspirin daily. Your cholesterol medication was increased    4) No driving until you are cleared by Neurology    Follow Up: Follow-up Information     Follow up With Specialties Details Why Contact Info    Edilberto Yepez MD Internal Medicine Schedule an appointment as soon as possible for a visit in 1 week  [address] [facility]  [phone]      Linh Fung MD Neurology Schedule an appointment as soon as possible for a visit in 2 weeks  [facility] [address]  [address] [facility]  [phone]              Information obtained by :  I understand that if any problems occur once I am at home I am to contact my physician. I understand and acknowledge receipt of the instructions indicated above. Physician's or R.N.'s Signature                                                                  Date/Time                                                                                                                                              Patient or Representative Signature                                                          Date/Time        Patient Education      DM Transient Ischemic Attack and/or Cerebral Vascular Accident Policy   It is the policy of the Department of Motor Vehicles, based on guidance and recommendations from the Medical Advisory Board, that if a  suffers a Transient Ischemic Attack (TIA), the s privilege to operate a motor vehicle will be suspended for three months. The three-month suspension may be shortened for drivers who have suffered a TIA, provided that treatment has been provided mitigating the risk of reoccurrence and there is no impact on a s ability to operate a motor vehicle safely. These cases may be referred to the [address] for their guidance and recommendations. If a  suffers a Cerebral Vascular Accident (CVA), the s privilege to operate a motor vehicle will be suspended for six months. This six-month suspension period may be shortened if [facility] receives information from the 's health care provider indicating that the  has fully recovered. These cases may be referred to the [address] for its guidance and recommendations. Following the six-month suspension, [facility] will request an evaluation with a certified  rehabilitation specialist if the  has suffered paralysis or cognitive changes due to the CVA.  If the s physical and cognitive information is not indicated in the medical report received by the agency, [facility] will request it from the health care provider. Based on the information received about the s cognitive abilities, the  may also be subject to the [facility] (Cognitive Impairment Policy. The  is also required to furnish an updated Vision Report (MED-4) with an examination date that is not older than 90 days and occrs after the TIA/CVA, in accordance with [place] Code Section 46.2-311. DMV may impose additional requirements on the individual depending on the information received by the agency. https://www.reyesContactual/. asp  Learning About Transient Ischemic Attack (TIA)  What is a TIA? A transient ischemic attack (TIA) means that the blood flow to a part of the brain is blocked for a short time. A TIA feels like a stroke but usually lasts only 10 to 20 minutes. Unlike a stroke, a TIA does not cause lasting brain damage. A TIA is usually caused by a blood clot that blocks blood flow in the brain. A blood clot can form in another part of the body (often the heart) and travel through the bloodstream to the brain. When blood flow to part of the brain is blocked, the brain cells in that area are affected within seconds. This causes symptoms in parts of the body controlled by those brain cells. When the blood clot dissolves, blood flow returns, and the symptoms go away. Blood clots can be the result of hardening of the arteries (atherosclerosis) or a heart attack. Sometimes a TIA is caused by a sharp drop in blood pressure that reduces blood flow to the brain. This is called a \"low-flow\" TIA. It is not as common as a TIA caused by a blood clot. What happens after a TIA? TIA is a serious warning sign of a possible stroke in the future. If you have other medical conditions such as coronary artery disease or atherosclerosis, you may also have an increased risk for a heart attack. Talk to your doctor about your risk. Understanding your risk will help you and your doctor plan your treatment options.   You can do a lot to lower your chance of having another TIA or a stroke. Medicines can help, and you may also need to make lifestyle changes. What are the symptoms? Symptoms of a TIA are the same as symptoms of a stroke. But symptoms of a TIA don't last very long. Most of the time, they go away in 10 to 20 minutes. They may include:  · Sudden numbness, tingling, weakness, or loss of movement in your face, arm, or leg, especially on only one side of your body. · Sudden vision changes. · Sudden trouble speaking. · Sudden confusion or trouble understanding simple statements. · Sudden problems with walking or balance. If you have any of these symptoms, call 911 or other emergency services right away. Ask your family, friends, and coworkers to learn the signs of a TIA. They may notice these signs before you do. Make sure they know to call 911 if these signs appear. How is a TIA treated? If you've had a TIA, you may need more testing and treatment after you get checked by your doctor. If you have a high risk of stroke, you may have to stay in the hospital for treatment. Your treatment for a TIA may include taking medicines to prevent blood clots or a stroke, or having surgery to reopen narrow arteries. How can you prevent another TIA? · Work with your doctor to treat any health problems you have. High blood pressure, high cholesterol, atrial fibrillation, and diabetes all raise your chances of having a stroke. · Be safe with medicines. Take your medicine exactly as prescribed. Call your doctor if you think you are having a problem with your medicine. · Have a healthy lifestyle. ? Do not smoke or allow others to smoke around you. If you need help quitting, talk to your doctor about stop-smoking programs and medicines. These can increase your chances of quitting for good. Smoking makes a stroke more likely. ? Limit alcohol to 2 drinks a day for men and 1 drink a day for women. ? Lose weight if you need to.  A healthy weight will help you keep your heart and body healthy. ? Be active. Ask your doctor what type and level of activity are safe for you.  ? Eat heart-healthy foods, like fruits, vegetables, and high-fiber foods. Follow-up care is a key part of your treatment and safety. Be sure to make and go to all appointments, and call your doctor if you are having problems. It's also a good idea to know your test results and keep a list of the medicines you take. Where can you learn more? Go to http://catrachito-chetan.info/. Enter R075 in the search box to learn more about \"Learning About Transient Ischemic Attack (TIA). \"  Current as of: September 26, 2018  Content Version: 11.9  © 1812-0437 Interview. Care instructions adapted under license by [employer] (which disclaims liability or warranty for this information). If you have questions about a medical condition or this instruction, always ask your healthcare professional. [place] [address] any warranty or liability for your use of this information. Patient Education        Transient Ischemic Attack: Care Instructions  Your Care Instructions    A transient ischemic attack (TIA) is when blood flow to a part of your brain is blocked for a short time. A TIA is like a stroke but usually lasts only a few minutes. A TIA does not cause lasting brain damage. Any vision problems, slurred speech, or other symptoms usually go away in 10 to 20 minutes. But they may last for up to 24 hours. TIAs are often warning signs of a stroke. Some people who have a TIA may have a stroke in the future. A stroke can cause symptoms like those of a TIA. But a stroke causes lasting damage to your brain. You can take steps to help prevent a stroke. One thing you can do is get early treatment. If you have other new symptoms, or if your symptoms do not get better, go back to the emergency room or call your doctor right away. Getting treatment right away may prevent long-term brain damage caused by a stroke. The doctor has checked you carefully, but problems can develop later. If you notice any problems or new symptoms, get medical treatment right away. Follow-up care is a key part of your treatment and safety. Be sure to make and go to all appointments, and call your doctor if you are having problems. It's also a good idea to know your test results and keep a list of the medicines you take. How can you care for yourself at home? Medicines    · Be safe with medicines. Take your medicines exactly as prescribed. Call your doctor if you think you are having a problem with your medicine.     · If you take a blood thinner, such as aspirin, be sure you get instructions about how to take your medicine safely. Blood thinners can cause serious bleeding problems.     · Call your doctor if you are not able to take your medicines for any reason.     · Do not take any over-the-counter medicines or herbal products without talking to your doctor first.     · If you take birth control pills or hormone therapy, talk to your doctor. Ask if these treatments are right for you.    Lifestyle changes    · Do not smoke. If you need help quitting, talk to your doctor about stop-smoking programs and medicines.     · Be active. If your doctor recommends it, get more exercise. Walking is a good choice. Bit by bit, increase the amount you walk every day. Try for at least 30 minutes on most days of the week. You also may want to swim, bike, or do other activities.     · Eat heart-healthy foods. These include fruits, vegetables, high-fiber foods, fish, and foods that are low in sodium, saturated fat, and trans fat.     · Stay at a healthy weight.  Lose weight if you need to.     · Limit alcohol to 2 drinks a day for men and 1 drink a day for women.    Staying healthy    · Manage other health problems such as diabetes, high blood pressure, and high cholesterol.     · Get the flu vaccine every year. When should you call for help? Call 911 anytime you think you may need emergency care. For example, call if:    · You have new or worse symptoms of a stroke. These may include:  ? Sudden numbness, tingling, weakness, or loss of movement in your face, arm, or leg, especially on only one side of your body. ? Sudden vision changes. ? Sudden trouble speaking. ? Sudden confusion or trouble understanding simple statements. ? Sudden problems with walking or balance. ? A sudden, severe headache that is different from past headaches. Call 911 even if these symptoms go away in a few minutes.     · You feel like you are having another TIA.    Watch closely for changes in your health, and be sure to contact your doctor if you have any problems. Where can you learn more? Go to http://catrachito-chetan.info/. Enter [phone] [id number] in the search box to learn more about \"Transient Ischemic Attack: Care Instructions. \"  Current as of: September 26, 2018  Content Version: 11.9  © 8204-1100 [employer]. Care instructions adapted under license by [employer] (which disclaims liability or warranty for this information). If you have questions about a medical condition or this instruction, always ask your healthcare professional. [address] any warranty or liability for your use of this information.

## 2019-07-01 NOTE — ED PROVIDER NOTES
70 y.o. female with past medical history significant for GERD, DDD who presents from home accompanied by her  with chief complaint of episode of AMS PTA. Pt states she was out reading on her porch tonight at , when she noticed the onset of blurred vision and inability to comprehend what she was reading. She further reports having to reread the same page multiple times, and still could \"not understand. \"  at bedside states the pt came into the house stating she \"didn't feel well\" and couldn't remember names of friends and family. Pt states the episode lasted about 45 minutes, then started to resolve. She reports currently feeling better, but still does not fully \"feel like herself. \" Pt specifically denies any slurred speech, speech difficultly, fever, chills, headache, shortness of breath, chest pain, abdominal pain, nausea, vomiting, numbness, weakness. There are no other acute medical concerns at this time. PSHx: Significant for  Section x2, breast reduction, tonsillectomy, colonoscopy, lumbar laminectomy, left knee replacement   Social Hx: .negative tobacco use, occasional EtOH use, negative Illicit Drug use    PCP: Aimee Bro MD    Note written by Ethan Douglas, as dictated by Aaron Joiner,  8:58 PM    The history is provided by the patient and the spouse. No  was used. Past Medical History:   Diagnosis Date    Angiomyolipoma of right kidney     3.3 cm upper pole 11/8/15.  AR (allergic rhinitis)     DDD (degenerative disc disease), lumbar     Dr. Shazia Haley. L3-L4, L4-L5 injections . radiates to left knee. MRI 10/2015 mild DJD, no clear stenosis    GERD (gastroesophageal reflux disease)     Gluten intolerance     Hyperlipidemia 2009    Osteopenia 2014    mild 14 T-1.6 L fem neck    Raynaud phenomenon     SCC (squamous cell carcinoma)     right forearm.  Dr. Candi Cronin       Past Surgical History: Procedure Laterality Date    HX BREAST REDUCTION  2009    HX  SECTION      2    HX COLONOSCOPY  2014    2 small polyps. Dr Vimal Mcginnis HX GI      perianal cyst    HX HEENT      face lift    HX KNEE ARTHROSCOPY      L knee - Zaslov    HX KNEE REPLACEMENT Left 2012    HX LUMBAR LAMINECTOMY  2017    L1-L2 Dr Anupam Truong    HX ORTHOPAEDIC      L shoulder, L1, L2 laminectomy    HX ORTHOPAEDIC      L ankle surgery    HX FAN AND BSO      HX TONSILLECTOMY      VASCULAR SURGERY PROCEDURE UNLIST      venous stripping R         Family History:   Problem Relation Age of Onset    Heart Failure Mother     Hypertension Mother     Stroke Mother     High Cholesterol Mother     Thyroid Disease Mother     Rashes/Skin Problems Mother     Cancer Mother         uterine    Heart Disease Mother         premature    Heart Attack Mother     Hypertension Father     Heart Failure Father     High Cholesterol Father     Dementia Father     Heart Disease Father     Stroke Father     Other Sister         deep venous thromboses and Pulmonary embolus    Cancer Paternal Aunt         colon    Diabetes Paternal Grandmother     Cancer Other         colon       Social History     Socioeconomic History    Marital status:      Spouse name: Not on file    Number of children: Not on file    Years of education: Not on file    Highest education level: Not on file   Occupational History    Not on file   Social Needs    Financial resource strain: Not on file    Food insecurity:     Worry: Not on file     Inability: Not on file    Transportation needs:     Medical: Not on file     Non-medical: Not on file   Tobacco Use    Smoking status: Never Smoker    Smokeless tobacco: Never Used   Substance and Sexual Activity    Alcohol use:  Yes     Alcohol/week: 1.0 oz     Types: 2 Glasses of wine per week    Drug use: No    Sexual activity: Never   Lifestyle    Physical activity:     Days per week: Not on file     Minutes per session: Not on file    Stress: Not on file   Relationships    Social connections:     Talks on phone: Not on file     Gets together: Not on file     Attends Judaism service: Not on file     Active member of club or organization: Not on file     Attends meetings of clubs or organizations: Not on file     Relationship status: Not on file    Intimate partner violence:     Fear of current or ex partner: Not on file     Emotionally abused: Not on file     Physically abused: Not on file     Forced sexual activity: Not on file   Other Topics Concern    Not on file   Social History Narrative    Not on file         ALLERGIES: Alfentanil; Gluten; Levaquin [levofloxacin]; Morphine; Percocet [oxycodone-acetaminophen]; and Tramadol    Review of Systems   Constitutional: Negative for appetite change, chills, fever and unexpected weight change. HENT: Negative for ear pain, hearing loss, rhinorrhea and trouble swallowing. Eyes: Positive for visual disturbance. Negative for pain. Respiratory: Negative for cough, chest tightness and shortness of breath. Cardiovascular: Negative for chest pain and palpitations. Gastrointestinal: Negative for abdominal distention, abdominal pain, blood in stool, nausea and vomiting. Genitourinary: Negative for dysuria, hematuria and urgency. Musculoskeletal: Negative for back pain and myalgias. Skin: Negative for rash. Neurological: Negative for dizziness, syncope, speech difficulty, weakness, numbness and headaches. Psychiatric/Behavioral: Positive for confusion. Negative for suicidal ideas. All other systems reviewed and are negative. Vitals:    06/30/19 2053 06/30/19 2119   BP: 187/82    Pulse: 69    Resp: 18    Temp: 97.7 °F (36.5 °C)    SpO2: 99%    Weight: 64.9 kg (143 lb) 66.5 kg (146 lb 9.7 oz)   Height: 5' 3\" (1.6 m)             Physical Exam   Constitutional: She is oriented to person, place, and time.  She appears well-developed and well-nourished. No distress. HENT:   Head: Normocephalic and atraumatic. Right Ear: External ear normal.   Left Ear: External ear normal.   Nose: Nose normal.   Mouth/Throat: Oropharynx is clear and moist. No oropharyngeal exudate. Eyes: Pupils are equal, round, and reactive to light. Conjunctivae and EOM are normal. Right eye exhibits no discharge. Left eye exhibits no discharge. No scleral icterus. Neck: Normal range of motion. Neck supple. No JVD present. No tracheal deviation present. Cardiovascular: Normal rate, regular rhythm, normal heart sounds and intact distal pulses. Exam reveals no gallop and no friction rub. No murmur heard. Pulmonary/Chest: Effort normal and breath sounds normal. No stridor. No respiratory distress. She has no decreased breath sounds. She has no wheezes. She has no rhonchi. She has no rales. She exhibits no tenderness. Abdominal: Soft. Bowel sounds are normal. She exhibits no distension. There is no tenderness. There is no rebound and no guarding. Musculoskeletal: Normal range of motion. She exhibits no edema or tenderness. Neurological: She is alert and oriented to person, place, and time. She has normal strength and normal reflexes. She displays normal reflexes. No cranial nerve deficit or sensory deficit. She exhibits normal muscle tone. Coordination normal. GCS eye subscore is 4. GCS verbal subscore is 5. GCS motor subscore is 6. Skin: Skin is warm and dry. No rash noted. She is not diaphoretic. No erythema. No pallor. Psychiatric: She has a normal mood and affect. Her behavior is normal. Judgment and thought content normal.   Nursing note and vitals reviewed.    Note written by Ethan Dietz, as dictated by Patria Koch DO 8:58 PM    MDM  Number of Diagnoses or Management Options  Aphasia:   Confusion:   TIA (transient ischemic attack):      Amount and/or Complexity of Data Reviewed  Clinical lab tests: ordered and reviewed  Tests in the radiology section of CPT®: ordered and reviewed  Tests in the medicine section of CPT®: ordered and reviewed  Discuss the patient with other providers: yes (Hospitalist)  Independent visualization of images, tracings, or specimens: yes (ekg)    Risk of Complications, Morbidity, and/or Mortality  Presenting problems: moderate  Diagnostic procedures: moderate  Management options: moderate    Patient Progress  Patient progress: stable         Procedures    Chief Complaint   Patient presents with    Altered mental status       The patient's presenting problems have been discussed, and they are in agreement with the care plan formulated and outlined with them. I have encouraged them to ask questions as they arise throughout their visit. MEDICATIONS GIVEN:  Medications   aspirin chewable tablet 324 mg (has no administration in time range)   acetaminophen (TYLENOL) tablet 1,000 mg (has no administration in time range)   esomeprazole (NEXIUM) capsule 40 mg (has no administration in time range)   fluticasone propionate (FLONASE) 50 mcg/actuation nasal spray 2 Spray (has no administration in time range)   . PHARMACY TO SUBSTITUTE PER PROTOCOL (Reordered from: lidocaine (LIDODERM) 5 %) (has no administration in time range)   pravastatin (PRAVACHOL) tablet 20 mg (has no administration in time range)   sodium chloride (NS) flush 5-40 mL (has no administration in time range)   sodium chloride (NS) flush 5-40 mL (has no administration in time range)   enoxaparin (LOVENOX) injection 40 mg (has no administration in time range)   diphenhydrAMINE (BENADRYL) capsule 25 mg (has no administration in time range)   ondansetron (ZOFRAN) injection 4 mg (has no administration in time range)   iopamidol (ISOVUE-370) 76 % injection 100 mL (100 mL IntraVENous Given 6/30/19 4679)       LABS REVIEWED:  Recent Results (from the past 24 hour(s))   GLUCOSE, POC    Collection Time: 06/30/19  8:57 PM   Result Value Ref Range    Glucose (POC) 78 65 - 100 mg/dL    Performed by Sudha Sewell    CBC WITH AUTOMATED DIFF    Collection Time: 06/30/19  9:18 PM   Result Value Ref Range    WBC 6.8 3.6 - 11.0 K/uL    RBC 4.27 3.80 - 5.20 M/uL    HGB 12.8 11.5 - 16.0 g/dL    HCT 40.6 35.0 - 47.0 %    MCV 95.1 80.0 - 99.0 FL    MCH 30.0 26.0 - 34.0 PG    MCHC 31.5 30.0 - 36.5 g/dL    RDW 13.7 11.5 - 14.5 %    PLATELET 641 235 - 318 K/uL    MPV 11.9 8.9 - 12.9 FL    NRBC 0.0 0  WBC    ABSOLUTE NRBC 0.00 0.00 - 0.01 K/uL    NEUTROPHILS 47 32 - 75 %    LYMPHOCYTES 38 12 - 49 %    MONOCYTES 14 (H) 5 - 13 %    EOSINOPHILS 0 0 - 7 %    BASOPHILS 1 0 - 1 %    IMMATURE GRANULOCYTES 0 0.0 - 0.5 %    ABS. NEUTROPHILS 3.2 1.8 - 8.0 K/UL    ABS. LYMPHOCYTES 2.6 0.8 - 3.5 K/UL    ABS. MONOCYTES 0.9 0.0 - 1.0 K/UL    ABS. EOSINOPHILS 0.0 0.0 - 0.4 K/UL    ABS. BASOPHILS 0.0 0.0 - 0.1 K/UL    ABS. IMM.  GRANS. 0.0 0.00 - 0.04 K/UL    DF AUTOMATED     METABOLIC PANEL, BASIC    Collection Time: 06/30/19  9:18 PM   Result Value Ref Range    Sodium 137 136 - 145 mmol/L    Potassium 4.0 3.5 - 5.1 mmol/L    Chloride 103 97 - 108 mmol/L    CO2 29 21 - 32 mmol/L    Anion gap 5 5 - 15 mmol/L    Glucose 89 65 - 100 mg/dL    BUN 18 6 - 20 MG/DL    Creatinine 0.73 0.55 - 1.02 MG/DL    BUN/Creatinine ratio 25 (H) 12 - 20      GFR est AA >60 >60 ml/min/1.73m2    GFR est non-AA >60 >60 ml/min/1.73m2    Calcium 9.2 8.5 - 10.1 MG/DL   PROTHROMBIN TIME + INR    Collection Time: 06/30/19  9:18 PM   Result Value Ref Range    INR 1.0 0.9 - 1.1      Prothrombin time 10.5 9.0 - 11.1 sec   EKG, 12 LEAD, INITIAL    Collection Time: 06/30/19  9:46 PM   Result Value Ref Range    Ventricular Rate 70 BPM    Atrial Rate 70 BPM    P-R Interval 144 ms    QRS Duration 76 ms    Q-T Interval 408 ms    QTC Calculation (Bezet) 440 ms    Calculated P Axis 16 degrees    Calculated R Axis 13 degrees    Calculated T Axis 13 degrees    Diagnosis       Normal sinus rhythm  Normal ECG  No previous ECGs available VITAL SIGNS:  Patient Vitals for the past 24 hrs:   Temp Pulse Resp BP SpO2   06/30/19 2053 97.7 °F (36.5 °C) 69 18 187/82 99 %       RADIOLOGY RESULTS:  The following have been ordered and reviewed:  Xr Chest Port    Result Date: 6/30/2019  INDICATION: Altered mental status. Acute stroke symptoms. FINDINGS: AP portable imaging of the chest performed at 9:23 PM demonstrates a normal cardiomediastinal silhouette. The lungs are clear bilaterally. No significant osseous abnormalities are seen. IMPRESSION: No evidence of acute cardiopulmonary process. Cta Code Neuro Head And Neck W Cont    Result Date: 6/30/2019  *PRELIMINARY REPORT* No acute thrombosis or significant stenosis. Preliminary report was provided by Dr. Riley Bolanos, the on-call radiologist, at 10:28 PM. Final report to follow. *END PRELIMINARY REPORT*     Ct Code Neuro Head Wo Contrast    Result Date: 6/30/2019  EXAM: CT CODE NEURO HEAD WO CONTRAST INDICATION: confusion COMPARISON: None. CONTRAST: None. TECHNIQUE: Unenhanced CT of the head was performed using 5 mm images. Brain and bone windows were generated. CT dose reduction was achieved through use of a standardized protocol tailored for this examination and automatic exposure control for dose modulation. FINDINGS: No extra-axial fluid collection hemorrhage or shift. Incidental calcified extra-axial lesion right posterior parietal likely meningioma. IMPRESSION: No acute findings. ED EKG interpretation:  Rhythm: normal sinus rhythm; and regular . Rate (approx.): 70; Axis: normal; P wave: normal; QRS interval: normal ; ST/T wave: normal; Other findings: normal. This EKG was interpreted by Luis Oakes DO, ED Provider. CONSULTATIONS:     CONSULT NOTE:  9:04 PM Errol Green DO spoke with Dr. Alejandra Shultz, Consult for Neurology. Discussed available diagnostic tests and clinical findings.   States if the pt is resolved he does not need to evaluate via tele neuro.    CONSULT NOTE:  10:30 PM Kimberley Ogden DO spoke with Dr. Lori Arechiga, Consult for Hospitalist.  Discussed available diagnostic tests and clinical findings. Will admit. PROGRESS NOTES:  10:30 PM  Discussed results and plan with patient. Pt is note a TPA candidate. Will give ASA after swallow screen passed. Patient will be admitted/observed for further evaluation and treatment. DIAGNOSIS:    1. Confusion    2. Aphasia    3. TIA (transient ischemic attack)        PLAN:    10:39 PM  Patient is being admitted to the hospital.  The results of their tests and reasons for their admission have been discussed with them and/or available family. They convey agreement and understanding for the need to be admitted and for their admission diagnosis. ED COURSE: The patient's hospital course has been uncomplicated.

## 2019-07-01 NOTE — PROGRESS NOTES
physical Therapy EVALUATION/DISCHARGE  Patient: Nabil Flower (89 y.o. female)  Date: 2019  Primary Diagnosis: Aphasia [R47.01]       Precautions:      ASSESSMENT :  Based on the objective data described below, the patient presents with independent level of mobility, intact balance, symmetrical strength and full ROM following admission for aphasia. Pt reports back to baseline. States R foot numbness following back surgery in March. Difficulty with stairs at baseline d/t knee replacement 7 years ago. Ambulated without difficulty 300ft. Scored 56/56 on Lopez Balance Assessment. Awaiting MRI. CT shows possible small meningioma. Will sign off at this time as pt has no acute therapy needs. Further skilled acute physical therapy is not indicated at this time. 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     OBJECTIVE DATA SUMMARY:   HISTORY:    Past Medical History:   Diagnosis Date    Angiomyolipoma of right kidney     3.3 cm upper pole 11/8/15.  AR (allergic rhinitis)     DDD (degenerative disc disease), lumbar     Dr. Moraima Freeman. L3-L4, L4-L5 injections . radiates to left knee. MRI 10/2015 mild DJD, no clear stenosis    GERD (gastroesophageal reflux disease)     Gluten intolerance     Hyperlipidemia 2009    Osteopenia 2014    mild 14 T-1.6 L fem neck    Raynaud phenomenon     SCC (squamous cell carcinoma)     right forearm. Dr. Tamika Braxton     Past Surgical History:   Procedure Laterality Date    HX BREAST REDUCTION      HX  SECTION      2    HX COLONOSCOPY  2014    2 small polyps.   Dr Conner Bryson HX GI      perianal cyst    HX HEENT      face lift    HX KNEE ARTHROSCOPY      L knee - Zaslov    HX KNEE REPLACEMENT Left 2012    HX LUMBAR LAMINECTOMY  2017    L1-L2 Dr Casey Roman [facility] L shoulder, L1, L2 laminectomy    HX ORTHOPAEDIC      L ankle surgery    HX FAN AND BSO      HX TONSILLECTOMY      VASCULAR SURGERY PROCEDURE UNLIST      venous stripping R     Prior Level of Function/Home Situation: Independent  Personal factors and/or comorbidities impacting plan of care: DDD, osteopenia    Home Situation  Home Environment: Private residence  # Steps to Enter: 4  Rails to Enter: Yes  Hand Rails : Bilateral  One/Two Story Residence: Two story, live on 1st floor  Living Alone: No  Support Systems: Spouse/Significant Other/Partner  Patient Expects to be Discharged to[de-identified] Private residence  Tub or Shower Type: Shower    EXAMINATION/PRESENTATION/DECISION MAKING:   Critical Behavior:  Neurologic State: Alert, Eyes open spontaneously  Orientation Level: Oriented X4  Cognition: Appropriate decision making, Appropriate for age attention/concentration, Appropriate safety awareness, Follows commands     Hearing: Auditory  Auditory Impairment: None  Skin:    Edema:   Range Of Motion:  AROM: Within functional limits           PROM: Within functional limits           Strength:    Strength: Within functional limits                    Tone & Sensation:   Tone: Normal              Sensation: Impaired(R foot baseline)               Coordination:  Coordination: Within functional limits  Vision:      Functional Mobility:  Bed Mobility:  Rolling: Independent  Supine to Sit: Independent  Sit to Supine: Independent     Transfers:  Sit to Stand: Independent  Stand to Sit: Independent                       Balance:   Sitting: Intact  Standing: Intact  Ambulation/Gait Training:              Gait Description (WDL): Within defined limits                                             Stairs:                Therapeutic Exercises:       Functional Measure:  Lopez Balance Test:    Sitting to Standin  Standing Unsupported: 4  Sitting with Back Unsupported: 4  Standing to Sittin  Transfers: 4  Standing Unsupported with Eyes Closed: 4  Standing Unsupported with Feet Together: 4  Reach Forward with Outstretched Arm: 4   Object: 4  Turn to Look Over Shoulders: 4  Turn 360 Degrees: 4  Alternate Foot on Step/Stool: 4  Standing Unsupported One Foot in Front: 4  Stand on One Le  Total: 56         56=Maximum possible score;   0-20=High fall risk  21-40=Moderate fall risk   41-56=Low fall risk                  Physical Therapy Evaluation Charge Determination   History Examination Presentation Decision-Making   MEDIUM  Complexity : 1-2 comorbidities / personal factors will impact the outcome/ POC  MEDIUM Complexity : 3 Standardized tests and measures addressing body structure, function, activity limitation and / or participation in recreation  LOW Complexity : Stable, uncomplicated  Other outcome measures Lopez  LOW       Based on the above components, the patient evaluation is determined to be of the following complexity level: LOW     Pain:  Pain Scale 1: Numeric (0 - 10)  Pain Intensity 1: 0  Pain Location 1: Head  Activity Tolerance:   Good  Please refer to the flowsheet for vital signs taken during this treatment. After treatment:   []   Patient left in no apparent distress sitting up in chair  [x]   Patient left in no apparent distress in bed  [x]   Call bell left within reach  [x]   Nursing notified  []   Caregiver present  []   Bed alarm activated    COMMUNICATION/EDUCATION:   Communication/Collaboration:  [x]   Fall prevention education was provided and the patient/caregiver indicated understanding. [x]   Patient/family have participated as able and agree with findings and recommendations. []   Patient is unable to participate in plan of care at this time.   Findings and recommendations were discussed with: Registered Nurse    Thank you for this referral.  Judit Guidry, PT   Time Calculation: 19 mins

## 2019-07-01 NOTE — ED TRIAGE NOTES
Triage: About an hour ago was sitting on the front porch came inside and told her  that she didn't feel well. Told her  that the book didn't make since and dint know her best friends name. Onset 1 hour ago, episode lasting 45 minutes. VAN negative.

## 2019-07-01 NOTE — CONSULTS
[facility]: [id number] [address] Neurology  [address]  [phone]      Name:   Nabil Flower   Medical record #: [id number]  Admission Date: 6/30/2019     Who Consulted: Dr. Lee Arellano    Reason for Consult:  TIA    HISTORY OF PRESENT ILLNESS:     This is a 70 y.o. female who is admitted for confusion. Ms. Angelo Aggarwal presented to the ED after an episode of seeing a bright light and inability to comprehend what she was reading. She further reports having to reread the same page multiple times, and still could \"not understand. \"  at bedside states the pt came into the house stating she \"didn't feel well\" and couldn't remember names of friends and family. Pt states the episode lasted about 45 minutes, then started to resolve. Her Bp upon admission to the ED was 187/82. The Neurology Service is asked to evaluate for TIA. Neuro-imaging:     CT Head: No extra-axial fluid collection hemorrhage or shift. Incidental calcified  extra-axial lesion right posterior parietal likely meningioma. CTA Head and Neck:   1. No evidence of large vessel occlusion or hemodynamically significant carotid  artery stenosis. 2.  Incidental note of a fetal right PCA. 3.  Calcified right parietal mass most consistent with a meningioma. EKG: normal sinus rhythm. Care Plan discussed with:  Patient x   Family    RN    Care Manager    Consultant/Specialist:         Thank you for allowing the Neurology Service the pleasure of participating in the care of your patient. This patient will be discussed with my collaborating care team physician,  Dr. Rossana Taveras and he may have further recommendations regarding this patient's care      Shayy Davidson, BOBBY-BC  ====================      Attending Attestation:       NEUROLOGY NOTE ADDENDUM:    7/1/2019      I have reviewed the documentation provided by the nurse practitioner, discussed her findings, clinical impression, and the proposed management plans with regards to this patient's encounter. I have personally performed a face to face diagnostic evaluation on this patient. My findings are as follows:      Umm Miller is a 70 y.o. female who  has a past medical history of Angiomyolipoma of right kidney, AR (allergic rhinitis), DDD (degenerative disc disease), lumbar, GERD (gastroesophageal reflux disease), Gluten intolerance, Hyperlipidemia (1/8/2009), Osteopenia (4/2014), Raynaud phenomenon, SCC (squamous cell carcinoma) (2014), and TIA (transient ischemic attack) (7/1/2019). She also has no past medical history of Abuse, Anemia NEC, Arrhythmia, Asthma, Autoimmune disease ([address] CAD (coronary artery disease), Calculus of kidney, Chronic kidney disease, Chronic pain, Congestive heart failure, unspecified, COPD, Depression, Diabetes ([address] Headache(784.0), Hypertension, Liver disease, Psychotic disorder ([address] PUD (peptic ulcer disease), Seizures ([address] Thromboembolus ([address] Thyroid disease, or Trauma. who presents for evaluation of change in vision. Patient apparently had a transient visual disturbance described as \"problem comprehending what she was reading\". Exam:  Visit Vitals  /69 (BP 1 Location: Right arm, BP Patient Position: At rest)   Pulse 70   Temp 98 °F (36.7 °C)   Resp 18   Ht 5' 3\" (1.6 m)   Wt 66.2 kg (146 lb)   SpO2 96%   BMI 25.86 kg/m²     Gen: Awake, alert, follows commands  Appropriate appearance, normal speech. Oriented to all spheres. No visual field defect on confrontation exam.  Full eyes movement, with no nystagmus, no diplopia, no ptosis. Normal gag and swallow. All remaining cranial nerves were normal  Motor function: 5/5 in all extremities  Sensory: intact to LT, PP and JPS  Good FTN and HTS   Gait: Deferred      Assessment  Visual disturbance, MRI brain showed a 1.3 cm R occipital meningioma which potentially can cause visual disturbance due to irritation of adjacent visual cortex    Plan  1.  Needs Neurosurgical evaluation which can be done as out patient      Thank you for the consultation. Yenifer Zamarripa MD  Diplomate, American Board of Psychiatry and Neurology  Diplomate, Neuromuscular Medicine  Diplomate, American Board of Electrodiagnostic Medicine                       Assessment/Plan:     1. Transient Ischemic Attack, r/o Stroke:    · ASA 81 mg  · Will need ASA at discharge  · Neurochecks:  Every 4 hours  · Blood Sugar Goal:  140-180  · BP Goal: Less than 140    Stroke work up  · A1C:  5.8  · LDL:   108.6, pravastatin increased to 40 mg  · TTE:    · MRI:  · Carotid vascular imaging:  No stenosis    Risk factors for stroke include:  Hx of DVT,  HLD, physical inactivity  · Discussed with patient    · Discussed signs/symptoms of stroke and when to call 911    3. Mobility:   · Has been OOB. · PT/OT to eval for rehab    4. Diet:    · Does not need SLP     5. VTE Prophylaxes:   · Per Primary team           Review of Systems: 10 point ROS was performed. Pertinent positives listed in HPI. Negative ROS is as follows. Pt denies: angina, palpitations, paresthesias, weakness, vision loss, slurred speech, aphasia, confusion, fever, chills, falls, headache, diplopia, back pain, neck pain, prior episodes of vertigo, hallucinations, new medications or dosage changes. Physical Exam    General:   Alert, cooperative, no acute distress. Lungs:   Clear to auscultation bilaterally. No crackles/wheezes. Heart:  Abdominal:  Normal rhythm, no carotid bruits, no peripheral edema  Soft and nondistended   NEUROLOGICAL EXAM:     Mental Status: Oriented to time, place and person. Fully attentive. No aphasia. Full fund of knowledge. Normal recent and remote memory. Cranial Nerves:   Visual Fields:  normal in all quadrants in both eyes. EOM: no nystagmus. Facial movements:  symmetric, no ptosis Facial sensation:  intact to LT on both sides.  Hearing:  normal.       Language:  no dysarthria, no aphasia, normal fluency, normal repetition. Tongue: midline. Soft palate: not examined  SCMs: normal, symmetric. Reflexes:   LUExt: 2+/ 4                 RUExt: 2+/ 4  LLExt: 2+/4                  RLExt: 2+/ 4          Sensory:   LT and Temp intact in all extremities            Cerebellar:  No resting, no postural tremors, normal finger nose finger. No pronator drift                            Motor:           LUExt: 5/ 5               RUExt: 5/ 5                                              LLExt: 5/ 5                RLExt: 5/ 5        Gait:   Not tested              Allergies: Allergies   Allergen Reactions    Alfentanil Shortness of Breath    Gluten Other (comments)     Bloating, nausea, patient states that this can be removed from allergy list    Levaquin [Levofloxacin] Nausea Only    Morphine Hives, Rash and Itching    Percocet [Oxycodone-Acetaminophen] Itching    Tramadol Nausea Only       Outpatient Meds  No current facility-administered medications on file prior to encounter. Current Outpatient Medications on File Prior to Encounter   Medication Sig Dispense Refill    pravastatin (PRAVACHOL) 20 mg tablet TAKE 1 TABLET BY MOUTH NIGHTLY 90 Tab 1    cyclobenzaprine (FLEXERIL) 5 mg tablet TAKE 1 TABLET BY MOUTH THREE TIMES A DAY AS NEEDED FOR MUSCLE SPASMS  2    acetaminophen (TYLENOL EXTRA STRENGTH) 500 mg tablet Take 1,000 mg by mouth every six (6) hours as needed for Pain.  celecoxib (CELEBREX) 200 mg capsule TAKE ONE CAPSULE BY MOUTH EVERY DAY 60 Cap 5    estradiol (ESTRACE) 0.01 % (0.1 mg/gram) vaginal cream USE 1 GRAM VAGINALLY 3 TIMES WEEKLY 42.5 g 3    cholecalciferol, vitamin D3, (VITAMIN D3) 2,000 unit tab Take  by mouth.  DOCOSAHEXANOIC ACID/EPA (FISH OIL PO) Take  by mouth.  coenzyme q10 (CO Q-10) 10 mg cap Take  by mouth.  ASCORBATE CALCIUM (VITAMIN C PO) Take  by mouth.  esomeprazole (NEXIUM) 40 mg capsule Take 1 capsule by mouth daily.  30 capsule 5    fluticasone (FLONASE) 50 mcg/actuation nasal spray USE 2 SPRAYS IN EACH NOSTRIL DAILY AS NEEDED FOR ALLERGIES 3 Bottle 8    loratadine (CLARITIN) 10 mg tablet Take 10 mg by mouth daily.  MULTIVITAMIN (MULTIPLE VITAMIN PO) Take  by mouth.  HYDROcodone-acetaminophen (NORCO) 5-325 mg per tablet Take 1 Tab by mouth every eight (8) hours as needed for Pain. Max Daily Amount: 3 Tabs. 30 Tab 0    diclofenac (VOLTAREN) 1 % gel Apply  to affected area four (4) times daily.  methocarbamol (ROBAXIN-750) 750 mg tablet Take 1 Tab by mouth four (4) times daily. 20 Tab 0    lidocaine (LIDODERM) 5 % Apply patch to the affected area for 12 hours a day and remove for 12 hours a day. 1 Each 0       Inpatient Meds    Current Facility-Administered Medications   Medication Dose Route Frequency Provider Last Rate Last Dose    acetaminophen (TYLENOL) tablet 1,000 mg  1,000 mg Oral Q6H PRN Debbie Villalta MD   1,000 mg at 06/30/19 2355    pantoprazole (PROTONIX) tablet 40 mg  40 mg Oral DAILY Debbie Villalta MD        fluticasone propionate (FLONASE) 50 mcg/actuation nasal spray 2 Spray  2 Spray Both Nostrils DAILY Debbie Villalta MD        pravastatin (PRAVACHOL) tablet 20 mg  20 mg Oral QHS Debbie Villalta MD   20 mg at 06/30/19 2355    sodium chloride (NS) flush 5-40 mL  5-40 mL IntraVENous Q8H Debbie Villalta MD   10 mL at 06/30/19 2241    sodium chloride (NS) flush 5-40 mL  5-40 mL IntraVENous PRN Debbie Villalta MD        enoxaparin (LOVENOX) injection 40 mg  40 mg SubCUTAneous Q24H Debbie Villalta MD   40 mg at 06/30/19 2355    diphenhydrAMINE (BENADRYL) capsule 25 mg  25 mg Oral Q4H PRN Debbie Villalta MD        [facility]dansetron [facility]) injection 4 mg  4 mg IntraVENous Q4H PRN Debbie Vilallta MD               Past Medical History:   Diagnosis Date    Angiomyolipoma of right kidney     3.3 cm upper pole 11/8/15.       AR (allergic rhinitis)     DDD (degenerative disc disease), lumbar     Dr. Manav Billy. L3-L4, L4-L5 injections . radiates to left knee. MRI 10/2015 mild DJD, no clear stenosis    GERD (gastroesophageal reflux disease)     Gluten intolerance     Hyperlipidemia 2009    Osteopenia 2014    mild 14 T-1.6 L fem neck    Raynaud phenomenon     SCC (squamous cell carcinoma)     right forearm. Dr. Parish Figueroa       Past Surgical History:   Procedure Laterality Date    HX BREAST REDUCTION      HX  SECTION      2    HX COLONOSCOPY  2014    2 small polyps. Dr Martinez Orn HX GI      perianal cyst    HX HEENT      face lift    HX KNEE ARTHROSCOPY      L knee - Zaslov    HX KNEE REPLACEMENT Left 2012    HX LUMBAR LAMINECTOMY  2017    L1-L2 Dr Greer Prude HX ORTHOPAEDIC      L shoulder, L1, L2 laminectomy    HX ORTHOPAEDIC      L ankle surgery    HX FAN AND BSO      HX TONSILLECTOMY      VASCULAR SURGERY PROCEDURE UNLIST      venous stripping R       family history includes Cancer in her mother, paternal aunt, and another family member; Dementia in her father; Diabetes in her paternal grandmother; Heart Attack in her mother; Heart Disease in her father and mother; Heart Failure in her father and mother; High Cholesterol in her father and mother; Hypertension in her father and mother; Other in her sister; Rashes/Skin Problems in her mother; Stroke in her father and mother; Thyroid Disease in her mother. reports that she has never smoked. She has never used smokeless tobacco. She reports that she drinks about 1.0 oz of alcohol per week. She reports that she does not use drugs.            Lab Results (last 24 hrs)  Recent Results (from the past 24 hour(s))   GLUCOSE, POC    Collection Time: 19  8:57 PM   Result Value Ref Range    Glucose (POC) 78 65 - 100 mg/dL    Performed by Jason Caputo    CBC WITH AUTOMATED DIFF    Collection Time: 19  9:18 PM   Result Value Ref Range    WBC 6.8 3.6 - 11.0 K/uL    RBC 4.27 3.80 - 5.20 M/uL    HGB 12.8 11.5 - 16.0 g/dL    HCT 40.6 35.0 - 47.0 %    MCV 95.1 80.0 - 99.0 FL    MCH 30.0 26.0 - 34.0 PG    MCHC 31.5 30.0 - 36.5 g/dL    RDW 13.7 11.5 - 14.5 %    PLATELET 625 847 - 979 K/uL    MPV 11.9 8.9 - 12.9 FL    NRBC 0.0 0  WBC    ABSOLUTE NRBC 0.00 0.00 - 0.01 K/uL    NEUTROPHILS 47 32 - 75 %    LYMPHOCYTES 38 12 - 49 %    MONOCYTES 14 (H) 5 - 13 %    EOSINOPHILS 0 0 - 7 %    BASOPHILS 1 0 - 1 %    IMMATURE GRANULOCYTES 0 0.0 - 0.5 %    ABS. NEUTROPHILS 3.2 1.8 - 8.0 K/UL    ABS. LYMPHOCYTES 2.6 0.8 - 3.5 K/UL    ABS. MONOCYTES 0.9 0.0 - 1.0 K/UL    ABS. EOSINOPHILS 0.0 0.0 - 0.4 K/UL    ABS. BASOPHILS 0.0 0.0 - 0.1 K/UL    ABS. IMM.  GRANS. 0.0 0.00 - 0.04 K/UL    DF AUTOMATED     METABOLIC PANEL, BASIC    Collection Time: 06/30/19  9:18 PM   Result Value Ref Range    Sodium 137 136 - 145 mmol/L    Potassium 4.0 3.5 - 5.1 mmol/L    Chloride 103 97 - 108 mmol/L    CO2 29 21 - 32 mmol/L    Anion gap 5 5 - 15 mmol/L    Glucose 89 65 - 100 mg/dL    BUN 18 6 - 20 MG/DL    Creatinine 0.73 0.55 - 1.02 MG/DL    BUN/Creatinine ratio 25 (H) 12 - 20      GFR est AA >60 >60 ml/min/1.73m2    GFR est non-AA >60 >60 ml/min/1.73m2    Calcium 9.2 8.5 - 10.1 MG/DL   PROTHROMBIN TIME + INR    Collection Time: 06/30/19  9:18 PM   Result Value Ref Range    INR 1.0 0.9 - 1.1      Prothrombin time 10.5 9.0 - 11.1 sec   EKG, 12 LEAD, INITIAL    Collection Time: 06/30/19  9:46 PM   Result Value Ref Range    Ventricular Rate 70 BPM    Atrial Rate 70 BPM    P-R Interval 144 ms    QRS Duration 76 ms    Q-T Interval 408 ms    QTC Calculation (Bezet) 440 ms    Calculated P Axis 16 degrees    Calculated R Axis 13 degrees    Calculated T Axis 13 degrees    Diagnosis       Normal sinus rhythm  Normal ECG  No previous ECGs available     ETHYL ALCOHOL    Collection Time: 06/30/19 10:57 PM   Result Value Ref Range    ALCOHOL(ETHYL),SERUM <10 <10 MG/DL   LIPID PANEL    Collection Time: 06/30/19 10:57 PM   Result Value Ref Range    LIPID PROFILE          Cholesterol, total 203 (H) <200 MG/DL    Triglyceride 62 <150 MG/DL    HDL Cholesterol 82 MG/DL    LDL, calculated 108.6 (H) 0 - 100 MG/DL    VLDL, calculated 12.4 MG/DL    CHOL/HDL Ratio 2.5 0.0 - 5.0     URINALYSIS W/MICROSCOPIC    Collection Time: 07/01/19 12:03 AM   Result Value Ref Range    Color YELLOW/STRAW      Appearance CLEAR CLEAR      Specific gravity 1.005 1.003 - 1.030      pH (UA) 7.0 5.0 - 8.0      Protein NEGATIVE  NEG mg/dL    Glucose NEGATIVE  NEG mg/dL    Ketone 15 (A) NEG mg/dL    Bilirubin NEGATIVE  NEG      Blood TRACE (A) NEG      Urobilinogen 1.0 0.2 - 1.0 EU/dL    Nitrites NEGATIVE  NEG      Leukocyte Esterase NEGATIVE  NEG      WBC 0-4 0 - 4 /hpf    RBC 0-5 0 - 5 /hpf    Epithelial cells FEW FEW /lpf    Bacteria NEGATIVE  NEG /hpf    Hyaline cast 0-2 0 - 5 /lpf   DRUG SCREEN, URINE    Collection Time: 07/01/19 12:03 AM   Result Value Ref Range    AMPHETAMINES NEGATIVE  NEG      BARBITURATES NEGATIVE  NEG      BENZODIAZEPINES NEGATIVE  NEG      COCAINE NEGATIVE  NEG      METHADONE NEGATIVE  NEG      OPIATES NEGATIVE  NEG      PCP(PHENCYCLIDINE) NEGATIVE  NEG      THC (TH-CANNABINOL) NEGATIVE  NEG      Drug screen comment (NOTE)

## 2019-07-01 NOTE — DISCHARGE SUMMARY
[facility] [place] [address]  [address]  [phone]    Physician Discharge Summary     Patient ID:  Brandt Mishra  [id number]  9 y.o.  1950    Admit date: 6/30/2019    Discharge date and time: 7/1/2019    Admission Diagnoses: Aphasia [R47.01]    Discharge Diagnoses:  Principal Diagnosis TIA (transient ischemic attack)                                            Principal Problem:    TIA (transient ischemic attack) (7/1/2019)    Active Problems:    Raynaud phenomenon (1/8/2009)      GERD (gastroesophageal reflux disease) (1/8/2009)      AR (allergic rhinitis) (1/8/2009)      Gluten intolerance (1/18/2012)      DDD (degenerative disc disease), lumbar ()      Overview: Dr. Jose Acuña. injections 2013. radiates to left knee      Hyperlipidemia (1/8/2009)      Confusion (6/30/2019)      Visual disturbance (6/30/2019)           Hospital Course:     71 yo hx of lumbar radiculopathy, DDD, hyperlipidemia, presented w/ confusion, blurry vision    1) TIA/confusion/blurry vision: symptoms have resolved. Head MRI, echo pending. Neuro following. ASA was started. Cont statin. No need for PT/OT/speech. Patient was told no driving until cleared by neurology     2) DDD (degenerative disc disease), lumbar: cont NSAIDs prn     3) Hyperlipidemia: LDL was 108.   Pravastatin was increased to 40mg daily    4) GERD: cont PPI     PCP: Aniket Valente MD     Consults: Neurology    Significant Diagnostic Studies: head MRI    Discharge Exam:  Physical Exam:    Gen: Well-developed, well-nourished, in no acute distress  HEENT:  Pink conjunctivae, PERRL, hearing intact to voice, moist mucous membranes  Neck: Supple, without masses, thyroid non-tender  Resp: No accessory muscle use, clear breath sounds without wheezes rales or rhonchi  Card: No murmurs, normal S1, S2 without thrills, bruits or peripheral edema  Abd:  Soft, non-tender, non-distended, normoactive bowel sounds are present  Lymph: No cervical or inguinal adenopathy  Musc: No cyanosis or clubbing  Skin: No rashes  Neuro:  Cranial nerves are grossly intact, no focal motor weakness, follows commands appropriately  Psych:  Good insight, oriented to person, place and time, alert    Disposition: home  Discharge Condition: Stable    Patient Instructions:   Current Discharge Medication List      START taking these medications    Details   aspirin delayed-release 81 mg tablet Take 1 Tab by mouth daily. Qty: 30 Tab, Refills: 1         CONTINUE these medications which have CHANGED    Details   pravastatin (PRAVACHOL) 40 mg tablet Take 1 Tab by mouth nightly. Qty: 30 Tab, Refills: 1         CONTINUE these medications which have NOT CHANGED    Details   OTHER,NON-FORMULARY, Apply 1 Applicator to affected area every Monday, Wednesday, Friday. Compounded HRT cream from [address]      fluticasone propionate [facility] ALLERGY RELIEF) 50 mcg/actuation nasal spray 2 Sprays by Both Nostrils route daily. diclofenac (VOLTAREN) 1 % gel Apply 2 g to affected area four (4) times daily as needed (pain). acetaminophen (TYLENOL EXTRA STRENGTH) 500 mg tablet Take 1,000 mg by mouth every six (6) hours as needed for Pain. celecoxib (CELEBREX) 200 mg capsule TAKE ONE CAPSULE BY MOUTH EVERY DAY  Qty: 60 Cap, Refills: 5      cholecalciferol (VITAMIN D3) 400 unit tab tablet Take 1 Tab by mouth daily. DOCOSAHEXANOIC ACID/EPA (FISH OIL PO) Take 1 Cap by mouth daily. coenzyme Q-10 (CO Q-10) 200 mg capsule Take 1 Cap by mouth daily. ASCORBATE CALCIUM (VITAMIN C PO) Take 1 Tab by mouth daily. esomeprazole (NEXIUM) 40 mg capsule Take 1 capsule by mouth daily. Qty: 30 capsule, Refills: 5      loratadine (CLARITIN) 10 mg tablet Take 10 mg by mouth daily. MULTIVITAMIN (MULTIPLE VITAMIN PO) Take 1 Tab by mouth daily.            Activity: Activity as tolerated  Diet: Low fat, Low cholesterol  Wound Care: None needed    Follow-up with  Follow-up Information     Follow up With Specialties Details Why Contact Info    Tima Rangel MD Internal Medicine Schedule an appointment as soon as possible for a visit in 1 week  [address]  [phone]      Yusuf Abad MD Neurology Schedule an appointment as soon as possible for a visit in 2 weeks  [address] [facility] [address] [place] [address]  [phone]            Follow-up tests/labs: none    Signed:  Brandie Ramos MD  7/1/2019  12:02 PM    I spent 31 min on discharge

## 2019-07-02 ENCOUNTER — TELEPHONE (OUTPATIENT)
Dept: INTERNAL MEDICINE CLINIC | Age: 69
End: 2019-07-02

## 2019-07-02 ENCOUNTER — PATIENT OUTREACH (OUTPATIENT)
Dept: INTERNAL MEDICINE CLINIC | Age: 69
End: 2019-07-02

## 2019-07-02 LAB
BACTERIA SPEC CULT: NORMAL
CC UR VC: NORMAL
SERVICE CMNT-IMP: NORMAL

## 2019-07-02 NOTE — TELEPHONE ENCOUNTER
Patient needs a BS [facility]  [facility] apt with in a week  DX:  TIA , transient ischemic attack  She can be reached at [phone] to schedule

## 2019-07-02 NOTE — PROGRESS NOTES
Hospital Discharge Follow-Up      Date/Time:  2019 11:51 AM    Patient was admitted to [facility] on 2019 and discharged on 2019 for TIA. The physician discharge summary was available at the time of outreach. Patient was contacted within 1 business days of discharge. Top Challenges reviewed with the provider   None at this time. Method of communication with provider :none    Inpatient RRAT score: 5  This was not a readmission. Nurse Navigator (NN) contacted the patient by telephone to perform post hospital discharge assessment. Verified name and  with patient as identifiers. Provided introduction to self, and explanation of the Nurse Navigator role. Reviewed discharge instructions and red flags with patient who verbalized understanding. Patient given an opportunity to ask questions and does not have any further questions or concerns at this time. The patient agrees to contact the PCP office for questions related to their healthcare. NN provided contact information for future reference. Disease Specific:   N/A    Summary of patient's top problems:  1. TIA - patient has no deficits and will follow up with Neurology. Home Health orders at discharge:None ordered. Barriers to care: None at this time. Advance Care Planning:   Does patient have an Advance Directive:  not on file     Medication(s):   New Medications at Discharge: aspirin delayed-release 81 mg tablet  Changed Medications at Discharge: pravastatin 40 mg tablet (PRAVACHOL)  Discontinued Medications at Discharge: None. Medication reconciliation was performed with patient, who verbalizes understanding of administration of home medications. There were no barriers to obtaining medications identified at this time.     Referral to Pharm D needed: no     Current Outpatient Medications   Medication Sig    fluticasone propionate (FLONASE ALLERGY RELIEF) 50 mcg/actuation nasal spray 2 Sprays by Both Nostrils route daily.  pravastatin (PRAVACHOL) 40 mg tablet Take 1 Tab by mouth nightly.  aspirin delayed-release 81 mg tablet Take 1 Tab by mouth daily.  celecoxib (CELEBREX) 200 mg capsule TAKE ONE CAPSULE BY MOUTH EVERY DAY    cholecalciferol (VITAMIN D3) 400 unit tab tablet Take 1 Tab by mouth daily.  DOCOSAHEXANOIC ACID/EPA (FISH OIL PO) Take 1 Cap by mouth daily.  coenzyme Q-10 (CO Q-10) 200 mg capsule Take 1 Cap by mouth daily.  ASCORBATE CALCIUM (VITAMIN C PO) Take 1 Tab by mouth daily.  esomeprazole (NEXIUM) 40 mg capsule Take 1 capsule by mouth daily.  loratadine (CLARITIN) 10 mg tablet Take 10 mg by mouth daily.  MULTIVITAMIN (MULTIPLE VITAMIN PO) Take 1 Tab by mouth daily. Nikky Fung, Apply 1 Applicator to affected area every Monday, Wednesday, Friday. Compounded HRT cream from [facility]    diclofenac (VOLTAREN) 1 % gel Apply 2 g to affected area four (4) times daily as needed (pain).  acetaminophen (TYLENOL EXTRA STRENGTH) 500 mg tablet Take 1,000 mg by mouth every six (6) hours as needed for Pain. No current facility-administered medications for this visit. There are no discontinued medications. [facility] follow up appointment(s):   Future Appointments   Date Time Provider Russell Mckeon   7/8/2019 10:00 AM Rosetta Wells MD [address] [id number]   7/19/2019  9:30 AM O'Neha, Byron Backer, NP [address]      Non-[facility] follow up appointment(s): Will schedule Neurosurgery appointment. [facility]:  n/a       Goals        Post Hospitalization     Understands red flags post discharge. 7/2/2019 Patient has no deficits. Speech and vision are back to normal. She understands signs and symptoms of stroke and when to seek medical care.  PAC

## 2019-07-08 ENCOUNTER — OFFICE VISIT (OUTPATIENT)
Dept: INTERNAL MEDICINE CLINIC | Age: 69
End: 2019-07-08

## 2019-07-08 VITALS
SYSTOLIC BLOOD PRESSURE: 108 MMHG | HEIGHT: 63 IN | HEART RATE: 72 BPM | RESPIRATION RATE: 18 BRPM | WEIGHT: 145.25 LBS | DIASTOLIC BLOOD PRESSURE: 71 MMHG | OXYGEN SATURATION: 98 % | TEMPERATURE: 98 F | BODY MASS INDEX: 25.73 KG/M2

## 2019-07-08 DIAGNOSIS — G45.9 TIA (TRANSIENT ISCHEMIC ATTACK): Primary | ICD-10-CM

## 2019-07-08 DIAGNOSIS — Z87.898 HISTORY OF APHASIA: ICD-10-CM

## 2019-07-08 DIAGNOSIS — I73.9 PERIPHERAL ARTERIAL DISEASE (HCC): ICD-10-CM

## 2019-07-08 DIAGNOSIS — Z86.69 HISTORY OF VISUAL DISTURBANCE: ICD-10-CM

## 2019-07-08 DIAGNOSIS — D32.9 MENINGIOMA (HCC): ICD-10-CM

## 2019-07-08 DIAGNOSIS — R41.0 EPISODE OF CONFUSION: ICD-10-CM

## 2019-07-08 NOTE — PROGRESS NOTES
HISTORY OF PRESENT ILLNESS    Chief Complaint   Patient presents with   [facility] Follow Up       TIA (transient ischemic attack) (7/1/2019)       Presents for 14-day transitional care management (TCM) visit s/p of hospital admission. Nurse Navigator call noted to patient and documented in [facility]. Hospital record and relevant lab results, test results and consult notes have personally been reviewed by me at this office visit. Medications reviewed and reconciled. Hospital Course: 6/30- 7/1/19     71 yo hx of lumbar radiculopathy, DDD, hyperlipidemia, presented w/ confusion, blurry vision  1) TIA/confusion/blurry vision: symptoms have resolved. Head MRI, echo. Neuro following. ASA was started. Cont statin. No need for PT/OT/speech. Patient was told no driving until cleared by neurology  2) DDD (degenerative disc disease), lumbar: cont NSAIDs prn  3) Hyperlipidemia: LDL was 108. Pravastatin was increased to 40mg daily  4) GERD: cont PPI    Patient reports that she was reading reports she had noticed a region of the page on the right side which she could no longer see well. It appeared relatively bright. She then realized that she could not read and understand words that she was trying to read. Had difficulty putting thoughts together and speaking in sentences. Contacted her  to the emergency room. Says that her symptoms overall lasted perhaps 45 minutes to an hour than largely resolved. Denies any associated headache. Denied any speech deficits, seizure type activity, focal motor deficits. Extensive work-up was done in the hospital including CTA of head and neck, MRI of the brain, echocardiogram, EKG, basic labs. Was clinically diagnosed with a TIA. Started on aspirin which she was not taking previously. Also increase pravastatin for LDL above goal.  MRI incidentally showed a calcified right parietal 1.3 cm meningioma. Today, reports she is doing well.   Denies any residual symptoms or recurrent symptoms. Is reading without difficulty. No visual changes. Tolerating aspirin and pravastatin. She is concerned about whether she needs to take additional anticoagulant therapy because of having had a TIA. She is not driving as instructed. She also says that her vascular doctor previously told her that she must have had clots in the arteries of her legs because of some noted scar tissue. Was told that she should be on anticoagulant therapy at the time of surgeries if needed. I do not have details of Dr. Sun Cox notes. Review of Systems   All other systems reviewed and are negative, except as noted in HPI    Past Medical and Surgical History   has a past medical history of Angiomyolipoma of right kidney, AR (allergic rhinitis), DDD (degenerative disc disease), lumbar, GERD (gastroesophageal reflux disease), Gluten intolerance, Hyperlipidemia (2009), Osteopenia (2014), Peripheral arterial disease ([address] Raynaud phenomenon, SCC (squamous cell carcinoma) (), and TIA (transient ischemic attack) (2019). She also has no past medical history of Abuse, Anemia NEC, Arrhythmia, Asthma, Autoimmune disease ([address] CAD (coronary artery disease), Calculus of kidney, Chronic kidney disease, Chronic pain, Congestive heart failure, unspecified, COPD, Depression, Diabetes ([address] Headache(784.0), Hypertension, Liver disease, Psychotic disorder ([address] PUD (peptic ulcer disease), Seizures ([address] Thromboembolus ([address] Thyroid disease, or Trauma. has a past surgical history that includes hx gi; hx  section; vascular surgery procedure unlist; hx breast reduction (); hx tonsillectomy; hx heent; hx colonoscopy (2014); hx lumbar laminectomy (2017); hx win and bso; hx orthopaedic; hx orthopaedic; hx knee arthroscopy; and hx knee replacement (Left, 2012). reports that she has never smoked.  She has never used smokeless tobacco. She reports that she drinks about 1.0 oz of alcohol per week. She reports that she does not use drugs. family history includes Cancer in her mother, paternal aunt, and another family member; Dementia in her father; Diabetes in her paternal grandmother; Heart Attack in her mother; Heart Disease in her father and mother; Heart Failure in her father and mother; High Cholesterol in her father and mother; Hypertension in her father and mother; Other in her sister; Rashes/Skin Problems in her mother; Stroke in her father and mother; Thyroid Disease in her mother. Physical Exam   Nursing note and vitals reviewed. Blood pressure 108/71, pulse 72, temperature 98 °F (36.7 °C), temperature source Oral, resp. rate 18, height 5' 3\" (1.6 m), weight 145 lb 4 oz (65.9 kg), last menstrual period 01/01/2000, SpO2 98 %. Constitutional:  No distress. Eyes: Conjunctivae are normal.   Ears:  Hearing grossly intact  Cardiovascular: Normal rate. regular rhythm, no murmurs or gallops  No edema  Pulmonary/Chest: Effort normal.   CTAB  Musculoskeletal: moves all 4 extremities   Neurological: Alert and oriented to person, place, and time. Skin: No rash noted. Psychiatric: Normal mood and affect. Behavior is normal.     ASSESSMENT and PLAN  Diagnoses and all orders for this visit:    1. TIA (transient ischemic attack)  2. Episode of confusion  3. History of aphasia  4. History of visual disturbance  Presumed TIA. No residual deficits. No other clear explanation for her transient symptoms. Complex migraine it is possible. She has no clear evidence of vascular disease on her imaging. Agree with treating this as a TIA. Agree that aspirin is the first line and no additional anticoagulant therapy is really needed at this point. I agree with aggressive statin management. Repeat fasting lipids in 3 months. She will follow-up with neurology next week. It may be clear to drive. 5. Meningioma (HCC)  Asymptomatic.   She sees Dr. Jasmin Coto in neurosurgery at [facility] and she will follow-up with him about this. I would recommend repeat imaging in 6 to 12 months. 6. Peripheral arterial disease ([address]? ? We will try to get notes from Dr. Hannah Solis about her leg arteries. If she does have significant PAD, this may be an indication to change her aspirin to Plavix. lab results and schedule of future lab studies reviewed with patient  reviewed medications and side effects in detail    Return to clinic for further evaluation if new symptoms develop    Follow-up and Dispositions    · Return in about 3 months (around 10/8/2019). Current Outpatient Medications   Medication Sandip Manual, Apply 1 Applicator to affected area every Monday, Wednesday, Friday. Compounded HRT cream from [address]    fluticasone propionate [facility] ALLERGY RELIEF) 50 mcg/actuation nasal spray 2 Sprays by Both Nostrils route daily.  pravastatin (PRAVACHOL) 40 mg tablet Take 1 Tab by mouth nightly.  aspirin delayed-release 81 mg tablet Take 1 Tab by mouth daily.  diclofenac (VOLTAREN) 1 % gel Apply 2 g to affected area four (4) times daily as needed (pain).  acetaminophen (TYLENOL EXTRA STRENGTH) 500 mg tablet Take 1,000 mg by mouth every six (6) hours as needed for Pain.  cholecalciferol (VITAMIN D3) 400 unit tab tablet Take 1 Tab by mouth daily.  DOCOSAHEXANOIC ACID/EPA (FISH OIL PO) Take 1 Cap by mouth daily.  coenzyme Q-10 (CO Q-10) 200 mg capsule Take 1 Cap by mouth daily.  ASCORBATE CALCIUM (VITAMIN C PO) Take 1 Tab by mouth daily.  esomeprazole (NEXIUM) 40 mg capsule Take 1 capsule by mouth daily.  loratadine (CLARITIN) 10 mg tablet Take 10 mg by mouth daily.  MULTIVITAMIN (MULTIPLE VITAMIN PO) Take 1 Tab by mouth daily. No current facility-administered medications for this visit.

## 2019-07-19 ENCOUNTER — OFFICE VISIT (OUTPATIENT)
Dept: NEUROLOGY | Age: 69
End: 2019-07-19

## 2019-07-19 ENCOUNTER — PATIENT OUTREACH (OUTPATIENT)
Dept: INTERNAL MEDICINE CLINIC | Age: 69
End: 2019-07-19

## 2019-07-19 VITALS
OXYGEN SATURATION: 98 % | SYSTOLIC BLOOD PRESSURE: 118 MMHG | DIASTOLIC BLOOD PRESSURE: 70 MMHG | WEIGHT: 145 LBS | HEIGHT: 63 IN | HEART RATE: 77 BPM | BODY MASS INDEX: 25.69 KG/M2

## 2019-07-19 DIAGNOSIS — G45.9 TIA (TRANSIENT ISCHEMIC ATTACK): Primary | ICD-10-CM

## 2019-07-19 NOTE — LETTER
7/19/2019 10:26 AM 
 
Ms. Rick Deleon [address] [id number] To Whom It May Concern,  
 
Ms. Kristine Brooks is currently under the care of [facility]. She was evaluated in the office today and considered neurologically stable to drive. If you have any further questions please contact our office. Sincerely, Murali Glass NP

## 2019-07-19 NOTE — PROGRESS NOTES
Doing well since hospital. Would like to know more information regarding the regulations of stroke and driving.

## 2019-07-19 NOTE — PROGRESS NOTES
Goals        Post Hospitalization     Understands red flags post discharge. 7/19/2019 Patient continues to do well. She has had no further symptoms and is back to herself. She did attend neurology appointment. At this time she can manage herself and appointments and will not require further NN follow up. [facility]    7/2/2019 Patient has no deficits. Speech and vision are back to normal. She understands signs and symptoms of stroke and when to seek medical care. She will follow up with the Neurology NP and Neurosurgeon.   PAC

## 2019-07-19 NOTE — PROGRESS NOTES
Scooter Osullivan is a 70 y.o. female who presents with the following  Chief Complaint   Patient presents with   [facility] Follow Up       HPI     Patient comes in for a follow up for TIA. She has been doing well since discharge. She has noticed no residual issues right now. She has no headaches, dizziness, memory loss, or aphasia. Feeling back to her full self.  agrees. Doing well overall. On Pravastatin  And Aspirin. No acute concerns. As a recap, Ms. Ivon Neal presented to the ED after an episode of seeing a bright light and inability to comprehend what she was reading. She further reports having to reread the same page multiple times, and still could \"not understand. \"  at bedside states the pt came into the house stating she \"didn't feel well\" and couldn't remember names of friends and family. Pt states the episode lasted about 45 minutes, then started to resolve. Her Bp upon admission to the ED was 187/82. The Neurology Service is asked to evaluate for TIA. Allergies   Allergen Reactions    Alfentanil Shortness of Breath    Levaquin [Levofloxacin] Nausea Only    Morphine Hives, Rash and Itching    Percocet [Oxycodone-Acetaminophen] Itching    Tramadol Nausea Only       Current Outpatient Medications   Medication Sig    OTHER,NON-FORMULARY, Apply 1 Applicator to affected area every Monday, Wednesday, Friday. Compounded HRT cream from [address]    fluticasone propionate [facility] ALLERGY RELIEF) 50 mcg/actuation nasal spray 2 Sprays by Both Nostrils route daily.  pravastatin (PRAVACHOL) 40 mg tablet Take 1 Tab by mouth nightly.  aspirin delayed-release 81 mg tablet Take 1 Tab by mouth daily.  diclofenac (VOLTAREN) 1 % gel Apply 2 g to affected area four (4) times daily as needed (pain).  acetaminophen (TYLENOL EXTRA STRENGTH) 500 mg tablet Take 1,000 mg by mouth every six (6) hours as needed for Pain.     cholecalciferol (VITAMIN D3) 400 unit tab tablet Take 1 Tab by mouth daily.  DOCOSAHEXANOIC ACID/EPA (FISH OIL PO) Take 1 Cap by mouth daily.  ASCORBATE CALCIUM (VITAMIN C PO) Take 1 Tab by mouth daily.  esomeprazole (NEXIUM) 40 mg capsule Take 1 capsule by mouth daily.  loratadine (CLARITIN) 10 mg tablet Take 10 mg by mouth daily.  MULTIVITAMIN (MULTIPLE VITAMIN PO) Take 1 Tab by mouth daily.  coenzyme Q-10 (CO Q-10) 200 mg capsule Take 1 Cap by mouth daily. No current facility-administered medications for this visit. Social History     Tobacco Use   Smoking Status Never Smoker   Smokeless Tobacco Never Used       Past Medical History:   Diagnosis Date    Angiomyolipoma of right kidney     3.3 cm upper pole 11/8/15.  AR (allergic rhinitis)     DDD (degenerative disc disease), lumbar     Dr. Romelia Ponce. L3-L4, L4-L5 injections . radiates to left knee. MRI 10/2015 mild DJD, no clear stenosis    GERD (gastroesophageal reflux disease)     Gluten intolerance     Hyperlipidemia 2009    Osteopenia 2014    mild 14 T-1.6 L fem neck    Peripheral arterial disease ([address]     per Dr. Angela Jamison of old arterial clots\"    Raynaud phenomenon     SCC (squamous cell carcinoma)     right forearm. Dr. Lewis January TIA (transient ischemic attack) 2019    episode of visual change, aphasia. Past Surgical History:   Procedure Laterality Date    HX BREAST REDUCTION      HX  SECTION      2    HX COLONOSCOPY  2014    2 small polyps.   Dr Guero Nelson HX GI      perianal cyst    HX HEENT      face lift    HX KNEE ARTHROSCOPY      L knee - Zaslov    HX KNEE REPLACEMENT Left 2012    HX LUMBAR LAMINECTOMY  2017    L1-L2 Dr Ariana Root    HX ORTHOPAEDIC      L shoulder, L1, L2 laminectomy    HX ORTHOPAEDIC      L ankle surgery    HX FAN AND BSO      HX TONSILLECTOMY      VASCULAR SURGERY PROCEDURE UNLIST      venous stripping R       Family History   Problem Relation Age of Onset    Heart Failure Mother     Hypertension Mother     Stroke Mother     High Cholesterol Mother     Thyroid Disease Mother     Rashes/Skin Problems Mother     Cancer Mother         uterine    Heart Disease Mother         premature    Heart Attack Mother     Hypertension Father     Heart Failure Father     High Cholesterol Father     Dementia Father     Heart Disease Father     Stroke Father     Other Sister         deep venous thromboses and Pulmonary embolus    Cancer Paternal Aunt         colon    Diabetes Paternal Grandmother     Cancer Other         colon       Social History     Socioeconomic History    Marital status:      Spouse name: Not on file    Number of children: Not on file    Years of education: Not on file    Highest education level: Not on file   Tobacco Use    Smoking status: Never Smoker    Smokeless tobacco: Never Used   Substance and Sexual Activity    Alcohol use: Yes     Alcohol/week: 1.7 standard drinks     Types: 2 Glasses of wine per week    Drug use: No    Sexual activity: Never       Review of Systems   Eyes: Negative for blurred vision, double vision and photophobia. Gastrointestinal: Negative for nausea and vomiting. Neurological: Negative for dizziness, tingling, tremors, seizures and headaches. Remainder of comprehensive review is negative. Physical Exam :    Visit Vitals  /70   Pulse 77   Ht 5' 3\" (1.6 m)   Wt 65.8 kg (145 lb)   LMP 01/01/2000   SpO2 98%   BMI 25.69 kg/m²       General: Well defined, nourished, and groomed individual in no acute distress.    Neck: Supple, nontender, no bruits, no pain with resistance to active range of motion.    Heart: Regular rate and rhythm, no murmurs, rub, or gallop. Normal S1S2.   Lungs: Clear to auscultation bilaterally with equal chest expansion, no cough, no wheeze  Musculoskeletal: Extremities revealed no edema and had full range of motion of joints.    Psych: Good mood and bright affect    NEUROLOGICAL EXAMINATION:    Mental Status: Alert and oriented to person, place, and time    Cranial Nerves:    II, III, IV, VI: Visual acuity grossly intact. Visual fields are normal.    Pupils are equal, round, and reactive to light and accommodation.    Extra-ocular movements are full and fluid. Fundoscopic exam was benign, no ptosis or nystagmus.    V-XII: Hearing is grossly intact. Facial features are symmetric, with normal sensation and strength. The palate rises symmetrically and the tongue protrudes midline. Sternocleidomastoids 5/5. Motor Examination: Normal tone, bulk, and strength, 5/5 muscle strength throughout. Coordination: Finger to nose was normal. No resting or intention tremor    Gait and Station: Steady while walking. Normal arm swing. No pronator drift. No muscle wasting or fasiculations noted. Reflexes: DTRs 2+ throughout. Results for orders placed or performed during the hospital encounter of 06/30/19   CULTURE, URINE   Result Value Ref Range    Special Requests: NO SPECIAL REQUESTS      [place] Count <1,000 CFU/ML      Culture result: NO GROWTH 1 DAY     CBC WITH AUTOMATED DIFF   Result Value Ref Range    WBC 6.8 3.6 - 11.0 K/uL    RBC 4.27 3.80 - 5.20 M/uL    HGB 12.8 11.5 - 16.0 g/dL    HCT 40.6 35.0 - 47.0 %    MCV 95.1 80.0 - 99.0 FL    MCH 30.0 26.0 - 34.0 PG    MCHC 31.5 30.0 - 36.5 g/dL    RDW 13.7 11.5 - 14.5 %    PLATELET 907 039 - 140 K/uL    MPV 11.9 8.9 - 12.9 FL    NRBC 0.0 0  WBC    ABSOLUTE NRBC 0.00 0.00 - 0.01 K/uL    NEUTROPHILS 47 32 - 75 %    LYMPHOCYTES 38 12 - 49 %    MONOCYTES 14 (H) 5 - 13 %    EOSINOPHILS 0 0 - 7 %    BASOPHILS 1 0 - 1 %    IMMATURE GRANULOCYTES 0 0.0 - 0.5 %    ABS. NEUTROPHILS 3.2 1.8 - 8.0 K/UL    ABS. LYMPHOCYTES 2.6 0.8 - 3.5 K/UL    ABS. MONOCYTES 0.9 0.0 - 1.0 K/UL    ABS. EOSINOPHILS 0.0 0.0 - 0.4 K/UL    ABS. BASOPHILS 0.0 0.0 - 0.1 K/UL    ABS. IMM.  GRANS. 0.0 0.00 - 0.04 K/UL    DF AUTOMATED     METABOLIC PANEL, BASIC   Result Value Ref Range    Sodium 137 136 - 145 mmol/L    Potassium 4.0 3.5 - 5.1 mmol/L    Chloride 103 97 - 108 mmol/L    CO2 29 21 - 32 mmol/L    Anion gap 5 5 - 15 mmol/L    Glucose 89 65 - 100 mg/dL    BUN 18 6 - 20 MG/DL    Creatinine 0.73 0.55 - 1.02 MG/DL    BUN/Creatinine ratio 25 (H) 12 - 20      GFR est AA >60 >60 ml/min/1.73m2    GFR est non-AA >60 >60 ml/min/1.73m2    Calcium 9.2 8.5 - 10.1 MG/DL   PROTHROMBIN TIME + INR   Result Value Ref Range    INR 1.0 0.9 - 1.1      Prothrombin time 10.5 9.0 - 11.1 sec   URINALYSIS W/MICROSCOPIC   Result Value Ref Range    Color YELLOW/STRAW      Appearance CLEAR CLEAR      Specific gravity 1.005 1.003 - 1.030      pH (UA) 7.0 5.0 - 8.0      Protein NEGATIVE  NEG mg/dL    Glucose NEGATIVE  NEG mg/dL    Ketone 15 (A) NEG mg/dL    Bilirubin NEGATIVE  NEG      Blood TRACE (A) NEG      Urobilinogen 1.0 0.2 - 1.0 EU/dL    Nitrites NEGATIVE  NEG      Leukocyte Esterase NEGATIVE  NEG      WBC 0-4 0 - 4 /hpf    RBC 0-5 0 - 5 /hpf    Epithelial cells FEW FEW /lpf    Bacteria NEGATIVE  NEG /hpf    Hyaline cast 0-2 0 - 5 /lpf   DRUG SCREEN, URINE   Result Value Ref Range    AMPHETAMINES NEGATIVE  NEG      BARBITURATES NEGATIVE  NEG      BENZODIAZEPINES NEGATIVE  NEG      COCAINE NEGATIVE  NEG      METHADONE NEGATIVE  NEG      OPIATES NEGATIVE  NEG      PCP(PHENCYCLIDINE) NEGATIVE  NEG      THC (TH-CANNABINOL) NEGATIVE  NEG      Drug screen comment (NOTE)    ETHYL ALCOHOL   Result Value Ref Range    ALCOHOL(ETHYL),SERUM <10 <10 MG/DL   LIPID PANEL   Result Value Ref Range    LIPID PROFILE          Cholesterol, total 203 (H) <200 MG/DL    Triglyceride 62 <150 MG/DL    HDL Cholesterol 82 MG/DL    LDL, calculated 108.6 (H) 0 - 100 MG/DL    VLDL, calculated 12.4 MG/DL    CHOL/HDL Ratio 2.5 0.0 - 5.0     HEMOGLOBIN A1C WITH EAG   Result Value Ref Range    Hemoglobin A1c 5.8 4.2 - 6.3 %    Est. average glucose 120 mg/dL   GLUCOSE, POC   Result Value Ref Range    Glucose (POC) 78 65 - 100 mg/dL    Performed by Seven Valentin    EKG, 12 LEAD, INITIAL   Result Value Ref Range    Ventricular Rate 70 BPM    Atrial Rate 70 BPM    P-R Interval 144 ms    QRS Duration 76 ms    Q-T Interval 408 ms    QTC Calculation (Bezet) 440 ms    Calculated P Axis 16 degrees    Calculated R Axis 13 degrees    Calculated T Axis 13 degrees    Diagnosis       Normal sinus rhythm  Normal ECG  No previous ECGs available  Confirmed by Chio Hassan MD. ([id number]) on 7/1/2019 8:46:36 AM     ECHO ADULT COMPLETE   Result Value Ref Range    LA Volume 42.55 22 - 52 mL    Ao Root D 3.05 cm    Aortic Valve Systolic Peak Velocity 222.74 cm/s    Aortic Valve Area by Continuity of Peak Velocity 2.8 cm2    AoV PG 6.0 mmHg    LVIDd 3.75 (A) 3.9 - 5.3 cm    LVPWd 1.14 (A) 0.6 - 0.9 cm    LVIDs 2.27 cm    IVSd 1.12 (A) 0.6 - 0.9 cm    LVOT d 2.11 cm    LVOT Peak Velocity 99.81 cm/s    LVOT Peak Gradient 4.0 mmHg    MV A Jerry 78.92 cm/s    MV E Jerry 70.25 cm/s    MV E/A 0.89     Left Atrium to Aortic Root Ratio 1.12     RVIDd 3.54 cm    LA Vol 4C 46.50 22 - 52 mL    LA Vol 2C 33.17 22 - 52 mL    LV Mass .2 67 - 162 g    LV Mass AL Index 92.9 43 - 95 g/m2    Mitral Regurgitant Peak Velocity 352.91 cm/s    Mitral Valve E Wave Deceleration Time 213.5 ms    Left Atrium Major Axis 3.41 cm    Triscuspid Valve Regurgitation Peak Gradient 15.0 mmHg    Pulmonic Valve Max Velocity 81.79 cm/s    TR Max Velocity 193.88 cm/s    LA Vol Index 25.15 16 - 28 ml/m2    LA Vol Index 19.61 16 - 28 ml/m2    LA Vol Index 27.49 16 - 28 ml/m2    MR Peak Gradient 49.8 mmHg    SUSHANT/BSA Pk Jerry 1.7 cm2/m2    PV peak gradient 2.7 mmHg       No orders of the defined types were placed in this encounter. No diagnosis found. TIA . Keep current medications. Want LDL below 70 per guidelines. modify lifestyle and risk factors discussed. Change diet and increase exercise. All testing up to date.    understsands s/s of a stroke and when to call 911. No neurological deficits on exam. Resume all activity.          This note will not be viewable in [employer]

## 2019-08-01 ENCOUNTER — PATIENT OUTREACH (OUTPATIENT)
Dept: INTERNAL MEDICINE CLINIC | Age: 69
End: 2019-08-01

## 2019-08-01 NOTE — PROGRESS NOTES
Patient has graduated from the Transitions of Care Coordination  program on 8/1/2019. Patient's symptoms are stable at this time. Patient/family has the ability to self-manage. Care management goals have been completed at this time. No further nurse navigator follow up scheduled. Pt has nurse navigator's contact information for any further questions, concerns, or needs.   Patients upcoming visits:    Future Appointments   Date Time Provider Russell Mckeon   9/9/2019  9:40 AM Ceci Wells MD [id number] [id number]

## 2019-08-06 RX ORDER — CEFDINIR 300 MG/1
300 CAPSULE ORAL 2 TIMES DAILY
Qty: 20 CAP | Refills: 0 | Status: SHIPPED | OUTPATIENT
Start: 2019-08-06 | End: 2019-08-16

## 2019-09-09 ENCOUNTER — OFFICE VISIT (OUTPATIENT)
Dept: INTERNAL MEDICINE CLINIC | Age: 69
End: 2019-09-09

## 2019-09-09 ENCOUNTER — HOSPITAL ENCOUNTER (OUTPATIENT)
Dept: LAB | Age: 69
Discharge: HOME | End: 2019-09-09
Payer: MEDICARE

## 2019-09-09 VITALS
RESPIRATION RATE: 18 BRPM | HEART RATE: 71 BPM | HEIGHT: 63 IN | BODY MASS INDEX: 25.98 KG/M2 | OXYGEN SATURATION: 97 % | SYSTOLIC BLOOD PRESSURE: 131 MMHG | DIASTOLIC BLOOD PRESSURE: 83 MMHG | WEIGHT: 146.6 LBS | TEMPERATURE: 97.9 F

## 2019-09-09 DIAGNOSIS — M85.80 OSTEOPENIA, UNSPECIFIED LOCATION: ICD-10-CM

## 2019-09-09 DIAGNOSIS — G45.9 TIA (TRANSIENT ISCHEMIC ATTACK): ICD-10-CM

## 2019-09-09 DIAGNOSIS — E78.5 HYPERLIPIDEMIA, UNSPECIFIED HYPERLIPIDEMIA TYPE: Primary | ICD-10-CM

## 2019-09-09 DIAGNOSIS — E55.9 VITAMIN D DEFICIENCY: ICD-10-CM

## 2019-09-09 DIAGNOSIS — Z78.0 ASYMPTOMATIC MENOPAUSAL STATE: ICD-10-CM

## 2019-09-09 DIAGNOSIS — Q21.12 PFO (PATENT FORAMEN OVALE): ICD-10-CM

## 2019-09-09 PROBLEM — I49.9 CARDIAC ARRHYTHMIA: Status: ACTIVE | Noted: 2019-01-01

## 2019-09-09 PROCEDURE — 80061 LIPID PANEL: CPT

## 2019-09-09 PROCEDURE — 82306 VITAMIN D 25 HYDROXY: CPT

## 2019-09-09 PROCEDURE — 36415 COLL VENOUS BLD VENIPUNCTURE: CPT

## 2019-09-09 PROCEDURE — 80053 COMPREHEN METABOLIC PANEL: CPT

## 2019-09-09 NOTE — PATIENT INSTRUCTIONS
Body Mass Index: Care Instructions  Your Care Instructions    Body mass index (BMI) can help you see if your weight is raising your risk for health problems. It uses a formula to compare how much you weigh with how tall you are. · A BMI lower than 18.5 is considered underweight. · A BMI between 18.5 and 24.9 is considered healthy. · A BMI between 25 and 29.9 is considered overweight. A BMI of 30 or higher is considered obese. If your BMI is in the normal range, it means that you have a lower risk for weight-related health problems. If your BMI is in the overweight or obese range, you may be at increased risk for weight-related health problems, such as high blood pressure, heart disease, stroke, arthritis or joint pain, and diabetes. If your BMI is in the underweight range, you may be at increased risk for health problems such as fatigue, lower protection (immunity) against illness, muscle loss, bone loss, hair loss, and hormone problems. BMI is just one measure of your risk for weight-related health problems. You may be at higher risk for health problems if you are not active, you eat an unhealthy diet, or you drink too much alcohol or use tobacco products. Follow-up care is a key part of your treatment and safety. Be sure to make and go to all appointments, and call your doctor if you are having problems. It's also a good idea to know your test results and keep a list of the medicines you take. How can you care for yourself at home? · Practice healthy eating habits. This includes eating plenty of fruits, vegetables, whole grains, lean protein, and low-fat dairy. · If your doctor recommends it, get more exercise. Walking is a good choice. Bit by bit, increase the amount you walk every day. Try for at least 30 minutes on most days of the week. · Do not smoke. Smoking can increase your risk for health problems. If you need help quitting, talk to your doctor about stop-smoking programs and medicines. These can increase your chances of quitting for good. · Limit alcohol to 2 drinks a day for men and 1 drink a day for women. Too much alcohol can cause health problems. If you have a BMI higher than 25  · Your doctor may do other tests to check your risk for weight-related health problems. This may include measuring the distance around your waist. A waist measurement of more than 40 inches in men or 35 inches in women can increase the risk of weight-related health problems. · Talk with your doctor about steps you can take to stay healthy or improve your health. You may need to make lifestyle changes to lose weight and stay healthy, such as changing your diet and getting regular exercise. If you have a BMI lower than 18.5  · Your doctor may do other tests to check your risk for health problems. · Talk with your doctor about steps you can take to stay healthy or improve your health. You may need to make lifestyle changes to gain or maintain weight and stay healthy, such as getting more healthy foods in your diet and doing exercises to build muscle. Where can you learn more? Go to http://catrachito-chetan.info/. Enter S176 in the search box to learn more about \"Body Mass Index: Care Instructions. \"  Current as of: October 13, 2016  Content Version: 11.4  © 2896-7360 [employer]. Care instructions adapted under license by [employer] (which disclaims liability or warranty for this information). If you have questions about a medical condition or this instruction, always ask your healthcare professional. [address] any warranty or liability for your use of this information.

## 2019-09-09 NOTE — PROGRESS NOTES
HISTORY OF PRESENT ILLNESS    Chief Complaint   Patient presents with    Cholesterol Problem       Presents for follow-up  Doing well. No other episodes of confusion since her presumed TIA on July 30. She is seeing cardiology at [facility]. HILARY showed patent PFO per Dr. Earl Florence. Siobhan Paredes 9/30 interventional cardiology. Her TTE echo and bubble study at [facility] 7/1 neg. Event monitor x1 mo done. Was found to have 6 sec run of Afib. Asymptomatic. Further evaluation pending. Taking Eliquis since 8/9/19 for PFO per Dr. Earl Florence. PAD? Dr. Charlies Hodgkin. scarring of L popliteal.      Will have MRI w contrast brain 1/2020 to f/u meningioma    R elbow injection 8/14/19 Dr. Hayley Dawkins. Mild improvement. Perhaps will consider PRP. Hyperlipidemia  Currently she takes 40 mg pravastatin since 7/1/19  ROS: taking medications as instructed, no medication side effects noted  No new myalgias, no joint pains, no weakness  No TIA's, no chest pain on exertion, no dyspnea on exertion, no swelling of ankles. Lab Results   Component Value Date/Time    Cholesterol, total 203 (H) 06/30/2019 10:57 PM    HDL Cholesterol 82 06/30/2019 10:57 PM    LDL, calculated 108.6 (H) 06/30/2019 10:57 PM    VLDL, calculated 12.4 06/30/2019 10:57 PM    Triglyceride 62 06/30/2019 10:57 PM    CHOL/HDL Ratio 2.5 06/30/2019 10:57 PM             Review of Systems   All other systems reviewed and are negative, except as noted in HPI    Past Medical and Surgical History   has a past medical history of Angiomyolipoma of right kidney, AR (allergic rhinitis), DDD (degenerative disc disease), lumbar, GERD (gastroesophageal reflux disease), Gluten intolerance, Hyperlipidemia (1/8/2009), Osteopenia (4/2014), Peripheral arterial disease ([facility] [address] Raynaud phenomenon, SCC (squamous cell carcinoma) (2014), and TIA (transient ischemic attack) (07/01/2019).  She also has no past medical history of Abuse, Anemia NEC, Arrhythmia, Asthma, Autoimmune disease ([facility] [address] CAD (coronary artery disease), Calculus of kidney, Chronic kidney disease, Chronic pain, Congestive heart failure, unspecified, COPD, Depression, Diabetes ([address] Headache(784.0), Hypertension, Liver disease, Psychotic disorder ([address] PUD (peptic ulcer disease), Seizures ([address] Thromboembolus ([facility] [address] Thyroid disease, or Trauma. has a past surgical history that includes hx gi; hx  section; vascular surgery procedure unlist; hx breast reduction (); hx tonsillectomy; hx heent; hx colonoscopy (2014); hx lumbar laminectomy (2017); hx win and bso; hx orthopaedic; hx orthopaedic; hx knee arthroscopy; and hx knee replacement (Left, 2012). reports that she has never smoked. She has never used smokeless tobacco. She reports that she drinks about 1.7 standard drinks of alcohol per week. She reports that she does not use drugs. family history includes Cancer in her mother, paternal aunt, and another family member; Dementia in her father; Diabetes in her paternal grandmother; Heart Attack in her mother; Heart Disease in her father and mother; Heart Failure in her father and mother; High Cholesterol in her father and mother; Hypertension in her father and mother; Other in her sister; Rashes/Skin Problems in her mother; Stroke in her father and mother; Thyroid Disease in her mother. Physical Exam   Nursing note and vitals reviewed. Blood pressure 131/83, pulse 71, temperature 97.9 °F (36.6 °C), temperature source Oral, resp. rate 18, height 5' 3\" (1.6 m), weight 146 lb 9.6 oz (66.5 kg), last menstrual period 2000, SpO2 97 %. Constitutional:  No distress. Eyes: Conjunctivae are normal.   Ears:  Hearing grossly intact  Cardiovascular: Normal rate. regular rhythm, no murmurs or gallops  No edema  Pulmonary/Chest: Effort normal.   CTAB  Musculoskeletal: moves all 4 extremities   Neurological: Alert and oriented to person, place, and time. Skin: No rash noted. Psychiatric: Normal mood and affect. Behavior is normal.     ASSESSMENT and PLAN  Diagnoses and all orders for this visit:    1. Hyperlipidemia, unspecified hyperlipidemia type  Controlled on current regimen. Continue current medications as written in chart  -     LIPID PANEL  -     METABOLIC PANEL, COMPREHENSIVE    2. PFO (patent foramen ovale)    She is consulting interventional cardiology. Continue Eliquis. 3. TIA (transient ischemic attack)   still undergoing evaluation for possible Afib  Continue Eliquis. 4. Osteopenia, unspecified location  History of osteopenia 5 years ago. Repeat scan. Consider Reclast every 18 months if progressive.  -     DEXA BONE DENSITY STUDY AXIAL; Future  -     VITAMIN D, 25 HYDROXY  5. Vitamin D deficiency  6. Asymptomatic menopausal state  -     DEXA BONE DENSITY STUDY AXIAL; Future  -     VITAMIN D, 25 HYDROXY    lab results and schedule of future lab studies reviewed with patient  reviewed medications and side effects in detail    Return to clinic for further evaluation if new symptoms develop        Current Outpatient Medications   Medication Sig    apixaban (ELIQUIS) 5 mg tablet Take 5 mg by mouth two (2) times a day. Euel Bolton, Apply 1 Applicator to affected area every Monday, Wednesday, Friday. Compounded HRT cream from [address]    fluticasone propionate [facility] ALLERGY RELIEF) 50 mcg/actuation nasal spray 2 Sprays by Both Nostrils route daily.  pravastatin (PRAVACHOL) 40 mg tablet Take 1 Tab by mouth nightly.  diclofenac (VOLTAREN) 1 % gel Apply 2 g to affected area four (4) times daily as needed (pain).  acetaminophen (TYLENOL EXTRA STRENGTH) 500 mg tablet Take 1,000 mg by mouth every six (6) hours as needed for Pain.  cholecalciferol (VITAMIN D3) 400 unit tab tablet Take 1 Tab by mouth daily.  DOCOSAHEXANOIC ACID/EPA (FISH OIL PO) Take 1 Cap by mouth daily.     coenzyme Q-10 (CO Q-10) 200 mg capsule Take 1 Cap by mouth daily.  ASCORBATE CALCIUM (VITAMIN C PO) Take 1 Tab by mouth daily.  esomeprazole (NEXIUM) 40 mg capsule Take 1 capsule by mouth daily.  loratadine (CLARITIN) 10 mg tablet Take 10 mg by mouth daily.  MULTIVITAMIN (MULTIPLE VITAMIN PO) Take 1 Tab by mouth daily. No current facility-administered medications for this visit. Discussed the patient's BMI with her. The BMI follow up plan is as follows:     dietary management education, guidance, and counseling  encourage exercise  monitor weight  prescribed dietary intake    An After Visit Summary was printed and given to the patient.

## 2019-09-10 LAB
25(OH)D3+25(OH)D2 SERPL-MCNC: 34.6 NG/ML (ref 30–100)
ALBUMIN SERPL-MCNC: 4.8 G/DL (ref 3.5–4.8)
ALBUMIN/GLOB SERPL: 2.7 {RATIO} (ref 1.2–2.2)
ALP SERPL-CCNC: 45 IU/L (ref 39–117)
ALT SERPL-CCNC: 23 IU/L (ref 0–32)
AST SERPL-CCNC: 19 IU/L (ref 0–40)
BILIRUB SERPL-MCNC: 0.3 MG/DL (ref 0–1.2)
BUN SERPL-MCNC: 14 MG/DL (ref 8–27)
BUN/CREAT SERPL: 22 (ref 12–28)
CALCIUM SERPL-MCNC: 9.4 MG/DL (ref 8.7–10.3)
CHLORIDE SERPL-SCNC: 102 MMOL/L (ref 96–106)
CHOLEST SERPL-MCNC: 215 MG/DL (ref 100–199)
CO2 SERPL-SCNC: 27 MMOL/L (ref 20–29)
CREAT SERPL-MCNC: 0.65 MG/DL (ref 0.57–1)
GLOBULIN SER CALC-MCNC: 1.8 G/DL (ref 1.5–4.5)
GLUCOSE SERPL-MCNC: 102 MG/DL (ref 65–99)
HDLC SERPL-MCNC: 93 MG/DL
INTERPRETATION, 910389: NORMAL
LDLC SERPL CALC-MCNC: 109 MG/DL (ref 0–99)
POTASSIUM SERPL-SCNC: 4.5 MMOL/L (ref 3.5–5.2)
PROT SERPL-MCNC: 6.6 G/DL (ref 6–8.5)
SODIUM SERPL-SCNC: 143 MMOL/L (ref 134–144)
TRIGL SERPL-MCNC: 64 MG/DL (ref 0–149)
VLDLC SERPL CALC-MCNC: 13 MG/DL (ref 5–40)

## 2020-02-06 ENCOUNTER — HOSPITAL ENCOUNTER (OUTPATIENT)
Dept: MAMMOGRAPHY | Age: 70
Discharge: HOME | End: 2020-02-06
Attending: INTERNAL MEDICINE
Payer: MEDICARE

## 2020-02-06 DIAGNOSIS — M85.80 OSTEOPENIA, UNSPECIFIED LOCATION: ICD-10-CM

## 2020-02-06 DIAGNOSIS — Z78.0 ASYMPTOMATIC MENOPAUSAL STATE: ICD-10-CM

## 2020-02-06 PROCEDURE — 77080 DXA BONE DENSITY AXIAL: CPT

## 2020-03-22 RX ORDER — PRAVASTATIN SODIUM 40 MG/1
TABLET ORAL
Qty: 90 TAB | Refills: 1 | Status: SHIPPED | OUTPATIENT
Start: 2020-03-22 | End: 2020-10-14

## 2020-10-14 RX ORDER — PRAVASTATIN SODIUM 40 MG/1
TABLET ORAL
Qty: 90 TAB | Refills: 1 | Status: SHIPPED | OUTPATIENT
Start: 2020-10-14 | End: 2021-05-27

## 2020-12-10 ENCOUNTER — TELEPHONE (OUTPATIENT)
Dept: INTERNAL MEDICINE CLINIC | Age: 70
End: 2020-12-10

## 2020-12-10 NOTE — TELEPHONE ENCOUNTER
----- Message from [place] sent at 12/10/2020 10:11 AM EST -----  Regarding: Dr. Mariajose Anaya Message/Vendor Calls    Caller's first and last name: Benjamin Park      Reason for call: Pt has been experiencing epigastric discomfort in center of chest for 5 weeks. Pt had a endoscopy on Monday and it was clear. Requesting a call back to discuss.       Callback required yes/no and why: yes      Best contact number(s): [phone]      Details to clarify the request: [id number] [facility]

## 2020-12-10 NOTE — TELEPHONE ENCOUNTER
Returned call to pt. She states she has had epigastric pain for 5 weeks. She had an EGD this Monday with esophageal dilation and biopsy. She states she was advised today that her bx was normal. Pt is questioning if something may be wrong with her gallbladder. She requests to schedule an appt with PCP to eval. Appt scheduled. She was also advised to follow up with GI about this as well.  She voices understanding and thanks

## 2020-12-17 ENCOUNTER — OFFICE VISIT (OUTPATIENT)
Dept: INTERNAL MEDICINE CLINIC | Age: 70
End: 2020-12-17
Payer: MEDICARE

## 2020-12-17 VITALS
TEMPERATURE: 97.9 F | RESPIRATION RATE: 16 BRPM | OXYGEN SATURATION: 99 % | WEIGHT: 150 LBS | HEART RATE: 73 BPM | HEIGHT: 63 IN | BODY MASS INDEX: 26.58 KG/M2 | DIASTOLIC BLOOD PRESSURE: 66 MMHG | SYSTOLIC BLOOD PRESSURE: 104 MMHG

## 2020-12-17 DIAGNOSIS — R10.13 EPIGASTRIC PAIN: Primary | ICD-10-CM

## 2020-12-17 DIAGNOSIS — R07.89 ANTERIOR CHEST WALL PAIN: ICD-10-CM

## 2020-12-17 DIAGNOSIS — M25.562 CHRONIC KNEE PAIN AFTER TOTAL REPLACEMENT OF LEFT KNEE JOINT: ICD-10-CM

## 2020-12-17 DIAGNOSIS — I87.2 VENOUS INSUFFICIENCY OF LEFT LEG: ICD-10-CM

## 2020-12-17 DIAGNOSIS — G89.29 CHRONIC KNEE PAIN AFTER TOTAL REPLACEMENT OF LEFT KNEE JOINT: ICD-10-CM

## 2020-12-17 DIAGNOSIS — E78.5 HYPERLIPIDEMIA, UNSPECIFIED HYPERLIPIDEMIA TYPE: ICD-10-CM

## 2020-12-17 DIAGNOSIS — Z98.890 HISTORY OF ESOPHAGEAL DILATATION: ICD-10-CM

## 2020-12-17 DIAGNOSIS — Z96.652 CHRONIC KNEE PAIN AFTER TOTAL REPLACEMENT OF LEFT KNEE JOINT: ICD-10-CM

## 2020-12-17 PROCEDURE — 3017F COLORECTAL CA SCREEN DOC REV: CPT | Performed by: INTERNAL MEDICINE

## 2020-12-17 PROCEDURE — 1090F PRES/ABSN URINE INCON ASSESS: CPT | Performed by: INTERNAL MEDICINE

## 2020-12-17 PROCEDURE — G8399 PT W/DXA RESULTS DOCUMENT: HCPCS | Performed by: INTERNAL MEDICINE

## 2020-12-17 PROCEDURE — G8427 DOCREV CUR MEDS BY ELIG CLIN: HCPCS | Performed by: INTERNAL MEDICINE

## 2020-12-17 PROCEDURE — G8536 NO DOC ELDER MAL SCRN: HCPCS | Performed by: INTERNAL MEDICINE

## 2020-12-17 PROCEDURE — G0463 HOSPITAL OUTPT CLINIC VISIT: HCPCS | Performed by: INTERNAL MEDICINE

## 2020-12-17 PROCEDURE — G8419 CALC BMI OUT NRM PARAM NOF/U: HCPCS | Performed by: INTERNAL MEDICINE

## 2020-12-17 PROCEDURE — G8432 DEP SCR NOT DOC, RNG: HCPCS | Performed by: INTERNAL MEDICINE

## 2020-12-17 PROCEDURE — G9899 SCRN MAM PERF RSLTS DOC: HCPCS | Performed by: INTERNAL MEDICINE

## 2020-12-17 PROCEDURE — 99214 OFFICE O/P EST MOD 30 MIN: CPT | Performed by: INTERNAL MEDICINE

## 2020-12-17 PROCEDURE — 1101F PT FALLS ASSESS-DOCD LE1/YR: CPT | Performed by: INTERNAL MEDICINE

## 2020-12-17 RX ORDER — ESTRADIOL 0.1 MG/G
CREAM VAGINAL
COMMUNITY
End: 2022-02-07 | Stop reason: ALTCHOICE

## 2020-12-17 RX ORDER — PRAVASTATIN SODIUM 40 MG/1
40 TABLET ORAL DAILY
COMMUNITY
Start: 2019-09-16 | End: 2020-12-17 | Stop reason: SDUPTHER

## 2020-12-17 NOTE — PROGRESS NOTES
HISTORY OF PRESENT ILLNESS    Chief Complaint   Patient presents with    Epigastric Pain       Presents for follow-up    Presents with epigastric lower sternal pain for about 6 weeks. Pain is relatively continuous all day and every day but does have some waves of worsening pain from time to time. Pain radiates to her mid back at times. Severity of pain remains from 1 to 6 out of 10. There is no obvious precipitating factor or event that clearly exacerbates or improves pain. Sometimes it seems to worsen with certain movements but sometimes occurs without any movement at all. There is no significant change with swallowing or eating. There is no associated nausea or vomiting. There is no associated shortness of breath or diaphoresis. Denies any cough. Sometimes the pain can be a little bit pleuritic but usually it is not. She denies any unintentional weight loss. She was sure that she had a peptic ulcer because of taking 325 mg of aspirin for years and she consulted GI Dr. Rachel Amezcua who performed an endoscopy last month which she reports was normal.  She did have a mild esophageal stricture which she said he dilated. She denies any significant dysphagia before or after the stricture dilation. She says she has had a little more soreness in that region since the dilation was performed but this seems to be improving day by day. She continues to take PPI therapy. No ulcer was seen. Dr. Rachel Amezcua also ordered an abdominal ultrasound to evaluate her gallbladder which she says appears normal.  Again, pain is not exacerbated by eating and is not really in the right upper quadrant. No recent blood work. She is concerned about resolving the etiology of this pain since she is going to have a redo of a left total knee replacement in early February at [facility]. She has a preop visit scheduled for January 15 at [facility].     She has a history of venous abnormalities seen on venous reflux ultrasound April 2019 by  [facility]. Was found to have scarring in the femoral and popliteal veins with patent flow channels. She was told that she will need to be on anticoagulant therapy for any further surgeries because this vein abnormality could only be scarring due to her previous clot. She does not have any previous history of DVT or pulmonary embolism. She is seeing Dr. Brody Dinh this month for follow-up of her event monitor. No obvious arrhythmias noted according to patient. Wt Readings from Last 3 Encounters:   20 150 lb (68 kg)   19 146 lb 9.6 oz (66.5 kg)   19 145 lb (65.8 kg)         Review of Systems   All other systems reviewed and are negative, except as noted in HPI    Past Medical and Surgical History   has a past medical history of Angiomyolipoma of right kidney, AR (allergic rhinitis), Cardiac arrhythmia (), DDD (degenerative disc disease), lumbar, GERD (gastroesophageal reflux disease), Gluten intolerance, Hyperlipidemia (2009), Osteopenia (2014), Peripheral arterial disease ([address] PFO (patent foramen ovale) (), Raynaud phenomenon, SCC (squamous cell carcinoma) (), and TIA (transient ischemic attack) (2019). She also has no past medical history of Abuse, Anemia NEC, Asthma, Autoimmune disease ([address] CAD (coronary artery disease), Calculus of kidney, Chronic kidney disease, Chronic pain, Congestive heart failure, unspecified, COPD, Depression, Diabetes ([address] Headache(784.0), Hypertension, Liver disease, Psychotic disorder ([address] PUD (peptic ulcer disease), Seizures ([address] Thromboembolus ([address] Thyroid disease, or Trauma.    has a past surgical history that includes hx gi; hx  section; vascular surgery procedure unlist; hx breast reduction (); hx tonsillectomy; hx heent; hx colonoscopy (2014); hx lumbar laminectomy (2017); hx win and bso; hx orthopaedic; hx orthopaedic; hx knee arthroscopy (Left); hx knee replacement (Left, 2012); and hx endoscopy (12/2020). reports that she has never smoked. She has never used smokeless tobacco. She reports current alcohol use of about 1.7 standard drinks of alcohol per week. She reports that she does not use drugs. family history includes Cancer in her mother, paternal aunt, and another family member; Dementia in her father; Diabetes in her paternal grandmother; Heart Attack in her mother; Heart Disease in her father and mother; Heart Failure in her father and mother; High Cholesterol in her father and mother; Hypertension in her father and mother; Other in her sister; Rashes/Skin Problems in her mother; Stroke in her father and mother; Thyroid Disease in her mother. Physical Exam   Nursing note and vitals reviewed. Blood pressure 104/66, pulse 73, temperature 97.9 °F (36.6 °C), temperature source Oral, resp. rate 16, height 5' 3\" (1.6 m), weight 150 lb (68 kg), last menstrual period 01/01/2000, SpO2 99 %. Constitutional:  No distress. Eyes: Conjunctivae are normal.   Ears:  Hearing grossly intact  Cardiovascular: Normal rate. regular rhythm, no murmurs or gallops  Lower chest wall mediastinal region discomfort with palpation. No palpable abnormalities of sternum. She also has significant discomfort with light to moderate palpation of epigastric region below xiphoid process. No palpable mass. No edema  Pulmonary/Chest: Effort normal.   CTAB  Musculoskeletal: moves all 4 extremities   Neurological: Alert and oriented to person, place, and time. Skin: No rash noted. Psychiatric: Normal mood and affect. Behavior is normal.     ASSESSMENT and PLAN  Diagnoses and all orders for this visit:    1. Epigastric pain  2. Anterior Inferior chest wall pain  6 weeks of relatively continuous discomfort of moderate intensity.   Endoscopy and abdominal ultrasound have not shown a clear cause although esophageal spasm may still be on the differential.  She is relatively sensitive in the upper abdomen and lower sternal wall region with palpation. Radiates to her mid back. Complete work-up ss needed. No other alarm symptoms such as weight loss or night sweats. Check pancreatic and liver enzymes and for any signs of anemia although no ulcer was seen. Recommend chest and abdominal CT to evaluate this region completely. Consideration of thoracic aortic aneurysm, pancreatic mass, extrinsic gastric or esophageal mass, mediastinal mass, thoracic spine disease all possible. This does not sound cardiac.  -     LIPASE; Future  -     AMYLASE; Future  -     CBC W/O DIFF; Future  -     METABOLIC PANEL, COMPREHENSIVE; Future  -     CT CHEST W CONT; Future  -     CT ABD W CONT; Future    3. History of esophageal dilatation  Does not appear to be related to her current symptoms although esophageal spasm is certainly possible. 4. Chronic knee pain after total replacement of left knee joint  Pending knee replacement surgery at [address] in early February. Preop in January. See below regarding preop management by me for anticoagulant therapy. 5. Venous insufficiency of left leg  Recommend Lovenox therapy during the perioperative period. She will consult me when ready. I can send in the prescription without another appointment. She is aware how to administer Lovenox. 6. Hyperlipidemia, unspecified hyperlipidemia type  Taking pravastatin daily. Unclear control  -     LIPID PANEL; Future    Identity apnea I did thank you  lab results and schedule of future lab studies reviewed with patient  reviewed medications and side effects in detail    Return to clinic for further evaluation if new symptoms develop        Current Outpatient Medications   Medication Sig    estradioL (ESTRACE) 0.01 % (0.1 mg/gram) vaginal cream Insert  into vagina.  pravastatin (PRAVACHOL) 40 mg tablet TAKE 1 TABLET BY MOUTH EVERY DAY    OTHER,NON-FORMULARY, Apply 1 Applicator to affected area every Monday, Wednesday, Friday.  Compounded HRT cream from [address]    fluticasone propionate [facility] ALLERGY RELIEF) 50 mcg/actuation nasal spray 2 Sprays by Both Nostrils route daily.  cholecalciferol (VITAMIN D3) 400 unit tab tablet Take 1 Tab by mouth daily.  coenzyme Q-10 (CO Q-10) 200 mg capsule Take 1 Cap by mouth daily.  esomeprazole (NEXIUM) 40 mg capsule Take 1 capsule by mouth daily.  loratadine (CLARITIN) 10 mg tablet Take 10 mg by mouth daily.  MULTIVITAMIN (MULTIPLE VITAMIN PO) Take 1 Tab by mouth daily. No current facility-administered medications for this visit.

## 2020-12-18 LAB
ALBUMIN SERPL-MCNC: 4.2 G/DL (ref 3.5–5)
ALBUMIN/GLOB SERPL: 1.6 {RATIO} (ref 1.1–2.2)
ALP SERPL-CCNC: 43 U/L (ref 45–117)
ALT SERPL-CCNC: 23 U/L (ref 12–78)
AMYLASE SERPL-CCNC: 48 U/L (ref 25–115)
ANION GAP SERPL CALC-SCNC: 7 MMOL/L (ref 5–15)
AST SERPL-CCNC: 13 U/L (ref 15–37)
BILIRUB SERPL-MCNC: 0.3 MG/DL (ref 0.2–1)
BUN SERPL-MCNC: 14 MG/DL (ref 6–20)
BUN/CREAT SERPL: 21 (ref 12–20)
CALCIUM SERPL-MCNC: 8.6 MG/DL (ref 8.5–10.1)
CHLORIDE SERPL-SCNC: 107 MMOL/L (ref 97–108)
CHOLEST SERPL-MCNC: 172 MG/DL
CO2 SERPL-SCNC: 28 MMOL/L (ref 21–32)
CREAT SERPL-MCNC: 0.67 MG/DL (ref 0.55–1.02)
ERYTHROCYTE [DISTWIDTH] IN BLOOD BY AUTOMATED COUNT: 14.8 % (ref 11.5–14.5)
GLOBULIN SER CALC-MCNC: 2.6 G/DL (ref 2–4)
GLUCOSE SERPL-MCNC: 93 MG/DL (ref 65–100)
HCT VFR BLD AUTO: 39.3 % (ref 35–47)
HDLC SERPL-MCNC: 82 MG/DL
HDLC SERPL: 2.1 {RATIO} (ref 0–5)
HGB BLD-MCNC: 12.2 G/DL (ref 11.5–16)
LDLC SERPL CALC-MCNC: 72.8 MG/DL (ref 0–100)
LIPASE SERPL-CCNC: 106 U/L (ref 73–393)
LIPID PROFILE,FLP: NORMAL
MCH RBC QN AUTO: 29.7 PG (ref 26–34)
MCHC RBC AUTO-ENTMCNC: 31 G/DL (ref 30–36.5)
MCV RBC AUTO: 95.6 FL (ref 80–99)
NRBC # BLD: 0 K/UL (ref 0–0.01)
NRBC BLD-RTO: 0 PER 100 WBC
PLATELET # BLD AUTO: 225 K/UL (ref 150–400)
PMV BLD AUTO: 12.2 FL (ref 8.9–12.9)
POTASSIUM SERPL-SCNC: 3.6 MMOL/L (ref 3.5–5.1)
PROT SERPL-MCNC: 6.8 G/DL (ref 6.4–8.2)
RBC # BLD AUTO: 4.11 M/UL (ref 3.8–5.2)
SODIUM SERPL-SCNC: 142 MMOL/L (ref 136–145)
TRIGL SERPL-MCNC: 86 MG/DL (ref ?–150)
VLDLC SERPL CALC-MCNC: 17.2 MG/DL
WBC # BLD AUTO: 5.1 K/UL (ref 3.6–11)

## 2020-12-21 ENCOUNTER — HOSPITAL ENCOUNTER (OUTPATIENT)
Dept: CT IMAGING | Age: 70
Discharge: HOME | End: 2020-12-21
Attending: INTERNAL MEDICINE
Payer: MEDICARE

## 2020-12-21 DIAGNOSIS — R10.13 EPIGASTRIC PAIN: ICD-10-CM

## 2020-12-21 DIAGNOSIS — R07.89 ANTERIOR CHEST WALL PAIN: ICD-10-CM

## 2020-12-21 PROCEDURE — 71260 CT THORAX DX C+: CPT

## 2020-12-21 PROCEDURE — 74160 CT ABDOMEN W/CONTRAST: CPT

## 2020-12-21 PROCEDURE — 74011000636 HC RX REV CODE- 636: Performed by: RADIOLOGY

## 2020-12-21 RX ADMIN — IOPAMIDOL 100 ML: 755 INJECTION, SOLUTION INTRAVENOUS at 16:32

## 2021-01-07 RX ORDER — ASPIRIN 325 MG
325 TABLET ORAL DAILY
Qty: 30 TAB | Refills: 11
Start: 2021-01-07 | End: 2022-05-17 | Stop reason: ALTCHOICE

## 2021-01-12 ENCOUNTER — OFFICE VISIT (OUTPATIENT)
Dept: SURGERY | Age: 71
End: 2021-01-12
Payer: MEDICARE

## 2021-01-12 VITALS
HEART RATE: 78 BPM | BODY MASS INDEX: 26.4 KG/M2 | SYSTOLIC BLOOD PRESSURE: 134 MMHG | WEIGHT: 149 LBS | DIASTOLIC BLOOD PRESSURE: 80 MMHG | TEMPERATURE: 98.1 F | RESPIRATION RATE: 20 BRPM | HEIGHT: 63 IN | OXYGEN SATURATION: 97 %

## 2021-01-12 DIAGNOSIS — R10.13 EPIGASTRIC ABDOMINAL PAIN: Primary | ICD-10-CM

## 2021-01-12 PROCEDURE — 1101F PT FALLS ASSESS-DOCD LE1/YR: CPT | Performed by: SURGERY

## 2021-01-12 PROCEDURE — G8399 PT W/DXA RESULTS DOCUMENT: HCPCS | Performed by: SURGERY

## 2021-01-12 PROCEDURE — G8510 SCR DEP NEG, NO PLAN REQD: HCPCS | Performed by: SURGERY

## 2021-01-12 PROCEDURE — 3017F COLORECTAL CA SCREEN DOC REV: CPT | Performed by: SURGERY

## 2021-01-12 PROCEDURE — 99213 OFFICE O/P EST LOW 20 MIN: CPT | Performed by: SURGERY

## 2021-01-12 PROCEDURE — 1090F PRES/ABSN URINE INCON ASSESS: CPT | Performed by: SURGERY

## 2021-01-12 PROCEDURE — G9899 SCRN MAM PERF RSLTS DOC: HCPCS | Performed by: SURGERY

## 2021-01-12 PROCEDURE — G8419 CALC BMI OUT NRM PARAM NOF/U: HCPCS | Performed by: SURGERY

## 2021-01-12 PROCEDURE — G8427 DOCREV CUR MEDS BY ELIG CLIN: HCPCS | Performed by: SURGERY

## 2021-01-12 PROCEDURE — G8536 NO DOC ELDER MAL SCRN: HCPCS | Performed by: SURGERY

## 2021-01-12 NOTE — PROGRESS NOTES
1. Have you been to the ER, urgent care clinic since your last visit? Hospitalized since your last visit? No    2. Have you seen or consulted any other health care providers outside of the [address] [place] [address] since your last visit? Include any pap smears or colon screening.  Dr Payton Stokes ,cardiologist  At [facility]

## 2021-01-18 NOTE — PATIENT INSTRUCTIONS

## 2021-01-18 NOTE — PROGRESS NOTES
Subjective:      Ángel Huynh is a 69 y.o. female presents for ongoing evaluation of epigastric pain. She underwent a antireflux work-up in the past which did not suggest antireflux surgery would be effective. She has a known [place] diaphragmatic hernia of unclear significance. She complains of epigastric pain, radiating to her chest, worse with meals. She denies nausea or vomiting. No improvement with antiacid medications. Recent abdominal ultrasound was negative. Objective:     Visit Vitals  /80   Pulse 78   Temp 98.1 °F (36.7 °C)   Resp 20   Ht 5' 3\" (1.6 m)   Wt 149 lb (67.6 kg)   LMP 01/01/2000   SpO2 97%   BMI 26.39 kg/m²       General:  alert, cooperative, no distress, appears stated age   Abdomen:  Deferred   Incision:   Deferred     Assessment:     Epigastric abdominal pain, oftentimes postprandial.  While she has above the left diaphragmatic hernia, her symptoms are more foregut/hepatobiliary in nature, and I suspect biliary dyskinesia. I recommended HIDA scan and she agrees with this plan. Plan:     1. Continue current medications. 2.  Low-fat diet. 3.  We will schedule for HIDA scan; follow up telephonically afterwards to review results and plan.

## 2021-01-21 ENCOUNTER — HOSPITAL ENCOUNTER (OUTPATIENT)
Dept: NUCLEAR MEDICINE | Age: 71
Discharge: HOME | End: 2021-01-21
Attending: SURGERY
Payer: MEDICARE

## 2021-01-21 VITALS — BODY MASS INDEX: 26.04 KG/M2 | WEIGHT: 147 LBS

## 2021-01-21 DIAGNOSIS — R10.13 EPIGASTRIC ABDOMINAL PAIN: ICD-10-CM

## 2021-01-21 PROCEDURE — 78227 HEPATOBIL SYST IMAGE W/DRUG: CPT

## 2021-01-21 PROCEDURE — 74011000258 HC RX REV CODE- 258: Performed by: SURGERY

## 2021-01-21 PROCEDURE — 74011250636 HC RX REV CODE- 250/636: Performed by: SURGERY

## 2021-01-21 RX ORDER — SODIUM CHLORIDE 9 MG/ML
25 INJECTION, SOLUTION INTRAVENOUS
Status: COMPLETED | OUTPATIENT
Start: 2021-01-21 | End: 2021-01-21

## 2021-01-21 RX ADMIN — SINCALIDE 1.33 MCG: 5 INJECTION, POWDER, LYOPHILIZED, FOR SOLUTION INTRAVENOUS at 12:30

## 2021-01-21 RX ADMIN — SODIUM CHLORIDE 25 ML: 9 INJECTION, SOLUTION INTRAVENOUS at 12:30

## 2021-01-22 ENCOUNTER — TELEPHONE (OUTPATIENT)
Dept: SURGERY | Age: 71
End: 2021-01-22

## 2021-01-22 NOTE — TELEPHONE ENCOUNTER
Patient is scheduled for 2/9 but patient is unsure why she needs appointment instead of being scheduled for surgery. I stated that Dr Judith Vicente requested appointment to follow up with results from labs per his office note. 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

## 2021-01-22 NOTE — TELEPHONE ENCOUNTER
I called the Nadege back and she said the patient called her and asked her to fax over her stress test to us. I provided her with our fax number. Nadege will fax to us.

## 2021-01-22 NOTE — TELEPHONE ENCOUNTER
I called the patient back and she is questioning why she needs to make an virtual appointment as surgery has already been discussed. She wanted to know why I could not schedule her surgery since she has looked at her results. I told her per Dr [employer] last office note he wants to do a virtual visit to discuss results and surgery will be set up at that time. Pt in agreement.

## 2021-01-22 NOTE — TELEPHONE ENCOUNTER
Patient was told to notify office when her hydascan was completed and she stated that she had a stress test done and her levels were normal and wanted to discuss with Dr Wendi Blair.

## 2021-01-22 NOTE — TELEPHONE ENCOUNTER
Nadege with [facility] called stating she received a message/call regarding Dr. Caren Gosselin needed her results to her stress test and wanted to know if there was anything else he needed before she faxed it over.

## 2021-02-02 ENCOUNTER — TELEPHONE (OUTPATIENT)
Dept: INTERNAL MEDICINE CLINIC | Age: 71
End: 2021-02-02

## 2021-02-02 DIAGNOSIS — M94.0 COSTOCHONDRITIS: Primary | ICD-10-CM

## 2021-02-02 RX ORDER — CELECOXIB 100 MG/1
100 CAPSULE ORAL 2 TIMES DAILY
Qty: 60 CAP | Refills: 2 | Status: SHIPPED | OUTPATIENT
Start: 2021-02-02 | End: 2021-05-16

## 2021-02-02 NOTE — TELEPHONE ENCOUNTER
Patient called requesting DR. Wells/Nurse please call DR. Nelly Lopez, Rheumatologist and schedule patient with him as soon as possible. Patient reports DR. Nelly Lopez office requires PCP office to schedule 1st appointment. Patient stated she believes she has Costochondritis which has been causing her symptoms which she has been seen by PCP for. Please call patient to advise of appointment information once scheduled. DR. Nelly Lopez, Rheumatologist  [phone]    PT# [phone]    New Order, office notes, and labs also requested for DR. Nelly Lopez, Rheumatologist.

## 2021-02-04 ENCOUNTER — DOCUMENTATION ONLY (OUTPATIENT)
Dept: INTERNAL MEDICINE CLINIC | Age: 71
End: 2021-02-04

## 2021-02-04 NOTE — PROGRESS NOTES
Attempted to schedule pt appt with Dr Lisa Levin, no answer, left voicemail requesting return call to schedule new patient appt.

## 2021-02-08 ENCOUNTER — TELEPHONE (OUTPATIENT)
Dept: INTERNAL MEDICINE CLINIC | Age: 71
End: 2021-02-08

## 2021-02-08 NOTE — TELEPHONE ENCOUNTER
----- Message from [facility] sent at 2/5/2021  4:34 PM EST -----  Regarding: DR. ULLOA/TELEPHONE  Referral    Caller (first and last name if not the patient or from practice):PT      Caller's relationship to patient (if not from a practice):PT      Name of caller (if calling from a practice):PT      Name of [address]      Specialist's title, first, and last name:DR. Abundio Lomeli, [facility]      Office Phone [phone]      Fax [phone]      Date and time of appointment:NONE      Reason for appointment:COSTOCONDRITIS      Details to clarify the request: SOMEONE WAS SUPPOSED TO CALL BACK TO SCHEDULE PT AND NO ONE HAS CALLED YET.       [facility]

## 2021-02-08 NOTE — TELEPHONE ENCOUNTER
Returned call to Dr Connor [facility] office, left third voicemail requesting return call to schedule pt for a new patient appt.

## 2021-02-09 ENCOUNTER — TELEPHONE (OUTPATIENT)
Dept: INTERNAL MEDICINE CLINIC | Age: 71
End: 2021-02-09

## 2021-02-09 NOTE — TELEPHONE ENCOUNTER
Rheumatology appt scheduled with Dr Demarco as Dr Sonido Harman does not accept her insurance per rheum office. Appt with Dr Demarco May 11 2021 at 8am, arrive at 7:30am at [address]. Pt has been advised. She states she prefers to see Dr Harman and will call their office to confirm that she can not see him. Pt thanks for the information

## 2021-03-03 ENCOUNTER — DOCUMENTATION ONLY (OUTPATIENT)
Dept: INTERNAL MEDICINE CLINIC | Age: 71
End: 2021-03-03

## 2021-03-30 ENCOUNTER — VIRTUAL VISIT (OUTPATIENT)
Dept: INTERNAL MEDICINE CLINIC | Age: 71
End: 2021-03-30
Payer: MEDICARE

## 2021-03-30 DIAGNOSIS — J01.90 ACUTE NON-RECURRENT SINUSITIS, UNSPECIFIED LOCATION: Primary | ICD-10-CM

## 2021-03-30 DIAGNOSIS — D32.9 MENINGIOMA (HCC): ICD-10-CM

## 2021-03-30 DIAGNOSIS — M25.512 LEFT SHOULDER PAIN, UNSPECIFIED CHRONICITY: ICD-10-CM

## 2021-03-30 DIAGNOSIS — R42 VERTIGO: ICD-10-CM

## 2021-03-30 DIAGNOSIS — H92.03 OTALGIA OF BOTH EARS: ICD-10-CM

## 2021-03-30 PROCEDURE — G0463 HOSPITAL OUTPT CLINIC VISIT: HCPCS | Performed by: INTERNAL MEDICINE

## 2021-03-30 PROCEDURE — 1090F PRES/ABSN URINE INCON ASSESS: CPT | Performed by: INTERNAL MEDICINE

## 2021-03-30 PROCEDURE — G8536 NO DOC ELDER MAL SCRN: HCPCS | Performed by: INTERNAL MEDICINE

## 2021-03-30 PROCEDURE — 3017F COLORECTAL CA SCREEN DOC REV: CPT | Performed by: INTERNAL MEDICINE

## 2021-03-30 PROCEDURE — 99213 OFFICE O/P EST LOW 20 MIN: CPT | Performed by: INTERNAL MEDICINE

## 2021-03-30 PROCEDURE — G8510 SCR DEP NEG, NO PLAN REQD: HCPCS | Performed by: INTERNAL MEDICINE

## 2021-03-30 PROCEDURE — G9899 SCRN MAM PERF RSLTS DOC: HCPCS | Performed by: INTERNAL MEDICINE

## 2021-03-30 PROCEDURE — G8399 PT W/DXA RESULTS DOCUMENT: HCPCS | Performed by: INTERNAL MEDICINE

## 2021-03-30 PROCEDURE — G8419 CALC BMI OUT NRM PARAM NOF/U: HCPCS | Performed by: INTERNAL MEDICINE

## 2021-03-30 PROCEDURE — 1101F PT FALLS ASSESS-DOCD LE1/YR: CPT | Performed by: INTERNAL MEDICINE

## 2021-03-30 PROCEDURE — G8427 DOCREV CUR MEDS BY ELIG CLIN: HCPCS | Performed by: INTERNAL MEDICINE

## 2021-03-30 RX ORDER — PREDNISONE 20 MG/1
40 TABLET ORAL DAILY
Qty: 10 TAB | Refills: 0 | Status: SHIPPED | OUTPATIENT
Start: 2021-03-30 | End: 2021-04-04

## 2021-03-30 RX ORDER — CEFDINIR 300 MG/1
300 CAPSULE ORAL 2 TIMES DAILY
Qty: 20 CAP | Refills: 0 | Status: SHIPPED | OUTPATIENT
Start: 2021-03-30 | End: 2021-04-13 | Stop reason: ALTCHOICE

## 2021-03-30 NOTE — PROGRESS NOTES
Consent: Abbey Allen, who was seen by synchronous (real-time) audio-video technology, and/or her healthcare decision maker, is aware that this patient-initiated, Telehealth encounter on 3/30/2021 is a billable service, with coverage as determined by her insurance carrier. She is aware that she may receive a bill and has provided verbal consent to proceed: Yes. 712  Subjective:   Abbey Allen is a 69 y.o. female who was seen for Sinus Infection (A few weeks ), Ear Pain (Pain, pressure and fluid), Dizziness (Lightheaded), Headache, and Arm Pain (Discomfort from COVID vaccine on 2/27/21)    Reports left arm pain/shoulder pain since her first [employer] vaccine 2/27/21. Radiates to her left trapezius region. May feel some small lymph nodes in that region that are tender. She can move it and sleep on it. Tolerated 2nd shot to R shoulder 3/20/21. Only side effect was fatigue, no shoulder pain. Taking celebrex. Taking lidocaine patch with partial relief as well. Reports a 3-week history of bilateral anterior maxillary sinus pressure, headache, occasional vertigo, bilateral ear pressure, L> R. Felt perhaps a low-grade fever at onset. Denies any significant sore throat. Has a mild nonproductive cough, mostly in the morning. She does have a history of seasonal allergies and she is using flonase bid, tylenol, claritin. Denies any lymphadenopathy noted. Symptoms are stable. Current Outpatient Medications   Medication Sig    celecoxib (CELEBREX) 100 mg capsule Take 1 Cap by mouth two (2) times a day for 90 days.  aspirin (ASPIRIN) 325 mg tablet Take 1 Tab by mouth daily.  estradioL (ESTRACE) 0.01 % (0.1 mg/gram) vaginal cream Insert  into vagina.  pravastatin (PRAVACHOL) 40 mg tablet TAKE 1 TABLET BY MOUTH EVERY DAY    OTHER,NON-FORMULARY, Apply 1 Applicator to affected area every Monday, Wednesday, Friday.  Compounded HRT cream from [address]    fluticasone propionate (FLONASE ALLERGY RELIEF) 50 mcg/actuation nasal spray 2 Sprays by Both Nostrils route daily.   • cholecalciferol (VITAMIN D3) 400 unit tab tablet Take 1 Tab by mouth daily.   • coenzyme Q-10 (CO Q-10) 200 mg capsule Take 1 Cap by mouth daily.   • esomeprazole (NEXIUM) 40 mg capsule Take 1 capsule by mouth daily.   • loratadine (CLARITIN) 10 mg tablet Take 10 mg by mouth daily.   • MULTIVITAMIN (MULTIPLE VITAMIN PO) Take 1 Tab by mouth daily.     No current facility-administered medications for this visit.        Allergies   Allergen Reactions   • Alfentanil Shortness of Breath   • Levaquin [Levofloxacin] Nausea Only   • Morphine Hives, Rash and Itching   • Percocet [Oxycodone-Acetaminophen] Itching   • Tramadol Nausea Only       Past Medical History:   Diagnosis Date   • Angiomyolipoma of right kidney     3.3 cm upper pole 11/8/15.     • AR (allergic rhinitis)    • Cardiac arrhythmia 2019    Dr. Mary.  has implanted event monitor   • DDD (degenerative disc disease), lumbar     Dr. Klein.  L3-L4, L4-L5 injections 2013. radiates to left knee.MRI 10/2015 mild DJD, no clear stenosis   • GERD (gastroesophageal reflux disease)    • Gluten intolerance    • Hyperlipidemia 1/8/2009   • Osteopenia 04/2014    mild 4/20/14 T-1.6 L fem neck.  Stable 2/2020 T -1.8 L fem neck   • Peripheral arterial disease (HCC)     per Dr. Bradley \"evidence of old arterial clots\"   • PFO (patent foramen ovale) 2019    Dr Mary at [facility], Dr. Ribera   • Raynaud phenomenon    • SCC (squamous cell carcinoma) 2014    right forearm. Dr. Ward   • TIA (transient ischemic attack) 07/01/2019    episode of visual change, aphasia.   • Venous insufficiency of left leg 04/22/2019    scarring of femoral and politeal veins w reflux of L CF, fem, pop.  Dr Bradley.  needs anticoag for surgery       ROS  All other systems reviewed and negative, unless mentioned in HPI    Objective:   Vital Signs: (As obtained by patient/caregiver at  home)  Visit Vitals  LMP 01/01/2000        [INSTRUCTIONS:  \"[x]\" Indicates a positive item  \"[]\" Indicates a negative item  -- DELETE ALL ITEMS NOT EXAMINED]    Constitutional: [x] Appears well-developed and well-nourished [x] No apparent distress      [] Abnormal -     Mental status: [x] Alert and awake  [x] Oriented to person/place/time [x] Able to follow commands    [] Abnormal -     Eyes:   EOM    [x]  Normal    [] Abnormal -   Sclera  [x]  Normal    [] Abnormal -          Discharge [x]  None visible   [] Abnormal -     HENT: [x] Normocephalic, atraumatic  [] Abnormal -   [x] Mouth/Throat: Mucous membranes are moist    External Ears [x] Normal  [] Abnormal -    Neck: [x] No visualized mass [] Abnormal -     Pulmonary/Chest: [x] Respiratory effort normal   [x] No visualized signs of difficulty breathing or respiratory distress        [] Abnormal -      Musculoskeletal:   [x] Normal gait with no signs of ataxia         [x] Normal range of motion of neck        [] Abnormal -     Neurological:        [x] No Facial Asymmetry (Cranial nerve 7 motor function) (limited exam due to video visit)          [x] No gaze palsy        [] Abnormal -          Skin:        [x] No significant exanthematous lesions or discoloration noted on facial skin         [] Abnormal -            Psychiatric:       [x] Normal Affect [] Abnormal -        [x] No Hallucinations    Other pertinent observable physical exam findings:-        Assessment & Plan:   Diagnoses and all orders for this visit:    1. Acute non-recurrent sinusitis, unspecified location  Difficult to determine whether this is an infection or simply congestion from seasonal allergies. Unable to examine a virtual visit. Recommend trial of antibiotics for now, but low if stressful for starting prednisone later next week if needed for congestion. We will try to hold off a few days on steroid because she is only 10 days out from her COVID-19 vaccine.   -     cefdinir (OMNICEF) 300 mg capsule; Take 1 Cap by mouth two (2) times a day. 2. Otalgia of both ears  Suspect this is more inner ear congestion rather than infection. Allergies. Will give Omnicef as above for possible sinus component. -     predniSONE (DELTASONE) 20 mg tablet; Take 40 mg by mouth daily for 5 days. 3. Vertigo  Very likely related to above. 4. Left shoulder pain, unspecified chronicity  This is a reactive lymphadenopathy and possible neuropathy pain from her injection. I suspect it will continue to improve week by week. Reassured. 5. Meningioma ([address]  This condition is being evaluated and managed by her specialist .   No acute findings today warrant change in management plan. We discussed the expected course, resolution and complications of the diagnosis(es) in detail. Medication risks, benefits, costs, interactions, and alternatives were discussed as indicated. I advised her to contact the office if her condition worsens, changes or fails to improve as anticipated. She expressed understanding with the diagnosis(es) and plan. King William is a 69 y.o. female who was evaluated by an audio-video encounter for concerns as above. Patient identification was verified prior to start of the visit. A caregiver was present when appropriate. Due to this being a TeleHealth encounter ([facility]), evaluation of the following organ systems was limited: Vitals/Constitutional/EENT/Resp/CV/GI//MS/Neuro/Skin/Heme-Lymph-Imm. Pursuant to the emergency declaration under the [facility], 1135 waiver authority and the ulike and [employer] Act, this Virtual Visit was conducted, with patient's (and/or legal guardian's) consent, to reduce the patient's risk of exposure to COVID-19 and provide necessary medical care.      Services were provided through a synchronous discussion virtually to substitute for in-person clinic visit. I was in the office. The patient was at home.      Joann Toth MD

## 2021-04-05 RX ORDER — AMOXICILLIN AND CLAVULANATE POTASSIUM 875; 125 MG/1; MG/1
1 TABLET, FILM COATED ORAL 2 TIMES DAILY
Qty: 14 TAB | Refills: 0 | Status: SHIPPED | OUTPATIENT
Start: 2021-04-05 | End: 2021-04-13 | Stop reason: ALTCHOICE

## 2021-04-12 ENCOUNTER — TELEPHONE (OUTPATIENT)
Dept: INTERNAL MEDICINE CLINIC | Age: 71
End: 2021-04-12

## 2021-04-12 NOTE — TELEPHONE ENCOUNTER
----- Message from Armani Ray sent at 4/12/2021 12:51 PM EDT -----  Regarding: telephone  General Message/Vendor Calls    Caller's first and last name: PT       Reason for call: PT has seen Dr Surendra Green [place] [address] about her sinus infection, sinus pressure she says she isnt getting any better and dosent want any further VV she wants to come in the office to be seen so someone can look in her ears and nose       Callback required yes/no and why: yes b/c PT dosent want a VV      Best contact number(s): [phone]      Details to clarify the request:       Armani Ray

## 2021-04-12 NOTE — TELEPHONE ENCOUNTER
She has had sinus symptoms for over 1 month. This is not COVID-19. Has had both vaccinations as well. We can see her in the office. This likely is allergies.

## 2021-04-12 NOTE — TELEPHONE ENCOUNTER
PSR returned patient's call, in office appointment scheduled. Per PCP okay for patient to be seen in office for evaluation.       Geremias Sprague MRN: [id number]    Date: 4/13/2021 Status: Dominic    Time: 8:40 AM Length: 20 [id number]   Visit Type: ROUTINE CARE [[id number]] Copay: $0.00    Provider: Akbar Herrera NP Department: [facility]    Referral #:   Referral Status:      Referring Provider:   Patient Type:      Notes: on going sinus issues

## 2021-04-13 ENCOUNTER — OFFICE VISIT (OUTPATIENT)
Dept: INTERNAL MEDICINE CLINIC | Age: 71
End: 2021-04-13
Payer: MEDICARE

## 2021-04-13 VITALS
HEART RATE: 75 BPM | SYSTOLIC BLOOD PRESSURE: 138 MMHG | RESPIRATION RATE: 16 BRPM | WEIGHT: 152 LBS | BODY MASS INDEX: 26.93 KG/M2 | DIASTOLIC BLOOD PRESSURE: 84 MMHG | HEIGHT: 63 IN | OXYGEN SATURATION: 95 % | TEMPERATURE: 98 F

## 2021-04-13 DIAGNOSIS — J01.41 ACUTE RECURRENT PANSINUSITIS: Primary | ICD-10-CM

## 2021-04-13 PROCEDURE — G8427 DOCREV CUR MEDS BY ELIG CLIN: HCPCS | Performed by: NURSE PRACTITIONER

## 2021-04-13 PROCEDURE — G8419 CALC BMI OUT NRM PARAM NOF/U: HCPCS | Performed by: NURSE PRACTITIONER

## 2021-04-13 PROCEDURE — G8536 NO DOC ELDER MAL SCRN: HCPCS | Performed by: NURSE PRACTITIONER

## 2021-04-13 PROCEDURE — G0463 HOSPITAL OUTPT CLINIC VISIT: HCPCS | Performed by: NURSE PRACTITIONER

## 2021-04-13 PROCEDURE — 1101F PT FALLS ASSESS-DOCD LE1/YR: CPT | Performed by: NURSE PRACTITIONER

## 2021-04-13 PROCEDURE — G8432 DEP SCR NOT DOC, RNG: HCPCS | Performed by: NURSE PRACTITIONER

## 2021-04-13 PROCEDURE — 3017F COLORECTAL CA SCREEN DOC REV: CPT | Performed by: NURSE PRACTITIONER

## 2021-04-13 PROCEDURE — 1090F PRES/ABSN URINE INCON ASSESS: CPT | Performed by: NURSE PRACTITIONER

## 2021-04-13 PROCEDURE — G9899 SCRN MAM PERF RSLTS DOC: HCPCS | Performed by: NURSE PRACTITIONER

## 2021-04-13 PROCEDURE — 99213 OFFICE O/P EST LOW 20 MIN: CPT | Performed by: NURSE PRACTITIONER

## 2021-04-13 PROCEDURE — G8399 PT W/DXA RESULTS DOCUMENT: HCPCS | Performed by: NURSE PRACTITIONER

## 2021-04-13 RX ORDER — DOXYCYCLINE 100 MG/1
100 CAPSULE ORAL 2 TIMES DAILY
Qty: 28 CAP | Refills: 0 | Status: SHIPPED | OUTPATIENT
Start: 2021-04-13 | End: 2022-02-07 | Stop reason: ALTCHOICE

## 2021-04-13 RX ORDER — PREDNISONE 20 MG/1
TABLET ORAL
Qty: 10 TAB | Refills: 0 | Status: SHIPPED | OUTPATIENT
Start: 2021-04-13 | End: 2022-02-07 | Stop reason: ALTCHOICE

## 2021-04-13 NOTE — PATIENT INSTRUCTIONS
Sinusitis: Care Instructions Your Care Instructions Sinusitis is an infection of the lining of the sinus cavities in your head. Sinusitis often follows a cold. It causes pain and pressure in your head and face. In most cases, sinusitis gets better on its own in 1 to 2 weeks. But some mild symptoms may last for several weeks. Sometimes antibiotics are needed. Follow-up care is a key part of your treatment and safety. Be sure to make and go to all appointments, and call your doctor if you are having problems. It's also a good idea to know your test results and keep a list of the medicines you take. How can you care for yourself at home? · Take an over-the-counter pain medicine, such as acetaminophen (Tylenol), ibuprofen (Advil, Motrin), or naproxen (Aleve). Read and follow all instructions on the label. · If the doctor prescribed antibiotics, take them as directed. Do not stop taking them just because you feel better. You need to take the full course of antibiotics. · Be careful when taking over-the-counter cold or flu medicines and Tylenol at the same time. Many of these medicines have acetaminophen, which is Tylenol. Read the labels to make sure that you are not taking more than the recommended dose. Too much acetaminophen (Tylenol) can be harmful. · Breathe warm, moist air from a steamy shower, a hot bath, or a sink filled with hot water. Avoid cold, dry air. Using a humidifier in your home may help. Follow the directions for cleaning the machine. · Use saline (saltwater) nasal washes. This can help keep your nasal passages open and wash out mucus and bacteria. You can buy saline nose drops at a grocery store or drugstore. Or you can make your own at home by adding 1 teaspoon of salt and 1 teaspoon of baking soda to 2 cups of distilled water. If you make your own, fill a bulb syringe with the solution, insert the tip into your nostril, and squeeze gently. Elfreda Skeans your nose.  
· Put a hot, wet towel or a warm gel pack on your face 3 or 4 times a day for 5 to 10 minutes each time. · Try a decongestant nasal spray like oxymetazoline (Afrin). Do not use it for more than 3 days in a row. Using it for more than 3 days can make your congestion worse. When should you call for help? Call your doctor now or seek immediate medical care if: 
  · You have new or worse swelling or redness in your face or around your eyes.  
  · You have a new or higher fever. Watch closely for changes in your health, and be sure to contact your doctor if: 
  · You have new or worse facial pain.  
  · The mucus from your nose becomes thicker (like pus) or has new blood in it.  
  · You are not getting better as expected. Where can you learn more? Go to http://www.gray.com/ Enter W322 in the search box to learn more about \"Sinusitis: Care Instructions. \" Current as of: December 2, 2020               Content Version: 12.8 © 2006-2021 [employer]. Care instructions adapted under license by [employer] (which disclaims liability or warranty for this information). If you have questions about a medical condition or this instruction, always ask your healthcare professional. [place] [address] any warranty or liability for your use of this information. Saline Nasal Washes: Care Instructions Your Care Instructions Saline nasal washes help keep the nasal passages open by washing out thick or dried mucus. This simple remedy can help relieve symptoms of allergies, sinusitis, and colds. It also can make the nose feel more comfortable by keeping the mucous membranes moist. You may notice a little burning sensation in your nose the first few times you use the solution, but this usually gets better in a few days. Follow-up care is a key part of your treatment and safety. Be sure to make and go to all appointments, and call your doctor if you are having problems.  It's also a good idea to know your test results and keep a list of the medicines you take. How can you care for yourself at home? · You can buy premixed saline solution in a squeeze bottle or other sinus rinse products at a drugstore. Read and follow the instructions on the label. · You also can make your own saline solution by adding 1 teaspoon of salt and 1 teaspoon of baking soda to 2 cups of distilled water. · If you use a homemade solution, pour a small amount into a clean bowl. Using a rubber bulb syringe, squeeze the syringe and place the tip in the salt water. Pull a small amount of the salt water into the syringe by relaxing your hand. · Sit down with your head tilted slightly back. Do not lie down. Put the tip of the bulb syringe or the squeeze bottle a little way into one of your nostrils. Gently drip or squirt a few drops into the nostril. Repeat with the other nostril. Some sneezing and gagging are normal at first. 
· Gently blow your nose. · Wipe the syringe or bottle tip clean after each use. · Repeat this 2 or 3 times a day. · Use nasal washes gently if you have nosebleeds often. When should you call for help? Watch closely for changes in your health, and be sure to contact your doctor if: 
  · You often get nosebleeds.  
  · You have problems doing the nasal washes. Where can you learn more? Go to http://www.gray.com/ Enter [id number] [id number] [phone] in the search box to learn more about \"Saline Nasal Washes: Care Instructions. \" Current as of: December 2, 2020               Content Version: 12.8 © 4199-5599 [employer]. Care instructions adapted under license by [employer] (which disclaims liability or warranty for this information). If you have questions about a medical condition or this instruction, always ask your healthcare professional. [place] [address] any warranty or liability for your use of this information.

## 2021-04-13 NOTE — PROGRESS NOTES
Lan Jeffers (: 1950) is a 69 y.o. female, established patient, here for evaluation of the following chief complaint(s):  Sinus Infection (on going - medication changes and still not feeling any better - pressure on her head and ears )       ASSESSMENT/PLAN:  1. Acute recurrent pansinusitis -- has not had relief with Omnicef and Augmentin; will treat with extended course of Doxycycline and Prednisone but will have her see ENT if symptoms do not resolve after treatment. She may benefit from use of saline nasal rinses; instructions given. -     doxycycline (VIBRAMYCIN) 100 mg capsule; Take 1 Cap by mouth two (2) times a day., Normal, Disp-28 Cap, R-0  -     predniSONE (DELTASONE) 20 mg tablet; Take 2 tabs daily x 3 days then 1 tab daily x 3 days then 1/2 tab daily x 2 days, Normal, Disp-10 Tab, R-0  -     REFERRAL TO ENT-OTOLARYNGOLOGY          SUBJECTIVE/OBJECTIVE:  HPI    Patient of Dr. Surendra Green who presents with complaints of persistent pain and pressure to frontal and maxillary sinuses for the past several weeks. She had virtual visit with Dr. Surendra Green on 3/30 and diagnosed with sinusitis; treated with cefdinir but did not see any improvement after 4 to 5 days so she contacted office and antibiotic was switched to Augmentin for 1 week along with oral steroids. Reports she completed prednisone course 2 days ago and took her last Augmentin tablet yesterday but continues to have fatigue, sinus pain, ear pressure. Reports she has had recurrent sinus infections in the past which typically respond well to Augmentin. Has not seen ENT for years.     Patient Active Problem List   Diagnosis Code    Raynaud phenomenon I73.00    GERD (gastroesophageal reflux disease) K21.9    AR (allergic rhinitis) J30.9    Gluten intolerance K90.41    DDD (degenerative disc disease), lumbar M51.36    Hyperlipidemia E78.5    Visual disturbance H53.9    TIA (transient ischemic attack) G45.9    PFO (patent foramen ovale) Q21.1  Cardiac arrhythmia I49.9    Venous insufficiency of left leg I87.2    Meningioma (HCC) D32.9     Past Surgical History:   Procedure Laterality Date    HX BREAST REDUCTION      HX  SECTION      2    HX COLONOSCOPY  2014    2 small polyps. Dr James Tran HX ENDOSCOPY  2020    Dr. Erica Bowden. dilation esophagus, otherwise normal    HX GI      perianal cyst    HX HEENT      face lift    HX KNEE ARTHROSCOPY Left     HX KNEE REPLACEMENT Left 2012    Dr. Craft Peed  2017    L1-L2 Dr Barros [place] HX ORTHOPAEDIC      L shoulder, L1, L2 laminectomy    HX ORTHOPAEDIC      L ankle surgery    HX FAN AND BSO      HX TONSILLECTOMY      VASCULAR SURGERY PROCEDURE UNLIST      venous stripping R     Social History     Socioeconomic History    Marital status:      Spouse name: Not on file    Number of children: Not on file    Years of education: Not on file    Highest education level: Not on file   Occupational History    Not on file   Social Needs    Financial resource strain: Not on file    Food insecurity     Worry: Not on file     Inability: Not on file    Transportation needs     Medical: Not on file     Non-medical: Not on file   Tobacco Use    Smoking status: Never Smoker    Smokeless tobacco: Never Used   Substance and Sexual Activity    Alcohol use:  Yes     Alcohol/week: 1.7 standard drinks     Types: 2 Glasses of wine per week    Drug use: No    Sexual activity: Never   Lifestyle    Physical activity     Days per week: Not on file     Minutes per session: Not on file    Stress: Not on file   Relationships    Social connections     Talks on phone: Not on file     Gets together: Not on file     Attends Religion service: Not on file     Active member of club or organization: Not on file     Attends meetings of clubs or organizations: Not on file     Relationship status: Not on file    Intimate partner violence     Fear of current or ex partner: Not on file     Emotionally abused: Not on file     Physically abused: Not on file     Forced sexual activity: Not on file   Other Topics Concern    Not on file   Social History Narrative    Not on file     Family History   Problem Relation Age of Onset    Heart Failure Mother     Hypertension Mother     Stroke Mother     High Cholesterol Mother     Thyroid Disease Mother     Rashes/Skin Problems Mother     Cancer Mother         uterine    Heart Disease Mother         premature    Heart Attack Mother     Hypertension Father     Heart Failure Father     High Cholesterol Father     Dementia Father     Heart Disease Father     Stroke Father     Other Sister         deep venous thromboses and Pulmonary embolus    Cancer Paternal Aunt         colon    Diabetes Paternal Grandmother     Cancer Other         colon     Current Outpatient Medications   Medication Sig    doxycycline (VIBRAMYCIN) 100 mg capsule Take 1 Cap by mouth two (2) times a day.  predniSONE (DELTASONE) 20 mg tablet Take 2 tabs daily x 3 days then 1 tab daily x 3 days then 1/2 tab daily x 2 days    celecoxib (CELEBREX) 100 mg capsule Take 1 Cap by mouth two (2) times a day for 90 days.  aspirin (ASPIRIN) 325 mg tablet Take 1 Tab by mouth daily.  estradioL (ESTRACE) 0.01 % (0.1 mg/gram) vaginal cream Insert  into vagina.  pravastatin (PRAVACHOL) 40 mg tablet TAKE 1 TABLET BY MOUTH EVERY DAY    OTHER,NON-FORMULARY, Apply 1 Applicator to affected area every Monday, Wednesday, Friday. Compounded HRT cream from [address]    fluticasone propionate [facility] ALLERGY RELIEF) 50 mcg/actuation nasal spray 2 Sprays by Both Nostrils route daily.  cholecalciferol (VITAMIN D3) 400 unit tab tablet Take 1 Tab by mouth daily.  coenzyme Q-10 (CO Q-10) 200 mg capsule Take 1 Cap by mouth daily.  esomeprazole (NEXIUM) 40 mg capsule Take 1 capsule by mouth daily.     loratadine (CLARITIN) 10 mg tablet Take 10 mg by mouth daily.  MULTIVITAMIN (MULTIPLE VITAMIN PO) Take 1 Tab by mouth daily. No current facility-administered medications for this visit. Allergies   Allergen Reactions    Alfentanil Shortness of Breath    Levaquin [Levofloxacin] Nausea Only    Morphine Hives, Rash and Itching    Percocet [Oxycodone-Acetaminophen] Itching    Tramadol Nausea Only     Immunization History   Administered Date(s) Administered    COVID-19, PFIZER, MRNA, LNP-S, PF, 30MCG/0.3ML DOSE 02/27/2021, 03/20/2021    Hepatitis A Vaccine 01/08/1994, 01/08/1994    Hepatitis B Vaccine 01/08/1994, 01/08/1994, 01/08/1994    Influenza High Dose Vaccine PF 02/13/2017, 11/02/2017    Influenza Vaccine 10/10/2020    Pneumococcal Conjugate (PCV-13) 07/24/2015    Pneumococcal Polysaccharide (PPSV-23) 07/11/2017    TDAP Vaccine 05/02/2011    Zoster Recombinant 09/24/2018    Zoster Vaccine, Live 08/21/2013         Review of Systems   Constitutional: Positive for chills and fatigue. Negative for appetite change and fever. HENT: Positive for congestion, ear pain, sinus pressure and sinus pain. Respiratory: Negative for cough and shortness of breath. Cardiovascular: Negative for chest pain. Musculoskeletal: Negative for myalgias. Neurological: Positive for headaches. Physical Exam  Vitals signs and nursing note reviewed. Constitutional:       General: She is not in acute distress. Appearance: Normal appearance. Comments: Appears tired   HENT:      Head: Normocephalic and atraumatic. Right Ear: Tympanic membrane, ear canal and external ear normal.      Left Ear: Tympanic membrane, ear canal and external ear normal.      Nose: Mucosal edema and congestion present. Right Turbinates: Enlarged. Left Turbinates: Enlarged. Right Sinus: Maxillary sinus tenderness and frontal sinus tenderness present. Left Sinus: Maxillary sinus tenderness and frontal sinus tenderness present. Mouth/Throat:      Mouth: Mucous membranes are moist.      Pharynx: Oropharynx is clear. Neck:      Musculoskeletal: Normal range of motion and neck supple. Cardiovascular:      Rate and Rhythm: Normal rate and regular rhythm. Pulses: Normal pulses. Heart sounds: Normal heart sounds. Pulmonary:      Effort: Pulmonary effort is normal.      Breath sounds: Normal breath sounds. Musculoskeletal: Normal range of motion. Lymphadenopathy:      Cervical: No cervical adenopathy. Skin:     General: Skin is warm and dry. Neurological:      General: No focal deficit present. Mental Status: She is alert and oriented to person, place, and time. Psychiatric:         Mood and Affect: Mood normal.         Behavior: Behavior normal.           On this date 04/13/2021 I have spent 25 minutes reviewing previous notes, test results and face to face with the patient discussing the diagnosis and importance of compliance with the treatment plan as well as documenting on the day of the visit. An electronic signature was used to authenticate this note.   -- Marcelo Jones NP

## 2021-04-29 ENCOUNTER — TRANSCRIBE ORDER (OUTPATIENT)
Dept: SCHEDULING | Age: 71
End: 2021-04-29

## 2021-04-29 DIAGNOSIS — J32.0 CHRONIC MAXILLARY SINUSITIS: Primary | ICD-10-CM

## 2021-04-30 ENCOUNTER — HOSPITAL ENCOUNTER (OUTPATIENT)
Dept: CT IMAGING | Age: 71
Discharge: HOME | End: 2021-04-30
Attending: OTOLARYNGOLOGY
Payer: MEDICARE

## 2021-04-30 DIAGNOSIS — J32.0 CHRONIC MAXILLARY SINUSITIS: ICD-10-CM

## 2021-04-30 PROCEDURE — 70486 CT MAXILLOFACIAL W/O DYE: CPT

## 2021-05-16 RX ORDER — CELECOXIB 100 MG/1
CAPSULE ORAL
Qty: 60 CAP | Refills: 2 | Status: SHIPPED | OUTPATIENT
Start: 2021-05-16 | End: 2022-05-17 | Stop reason: ALTCHOICE

## 2021-05-27 RX ORDER — PRAVASTATIN SODIUM 40 MG/1
TABLET ORAL
Qty: 90 TABLET | Refills: 1 | Status: SHIPPED | OUTPATIENT
Start: 2021-05-27 | End: 2021-11-22

## 2021-08-27 ENCOUNTER — TELEPHONE (OUTPATIENT)
Dept: INTERNAL MEDICINE CLINIC | Age: 71
End: 2021-08-27

## 2021-08-27 NOTE — TELEPHONE ENCOUNTER
Spoke with pharmacy and asked them to fax over their request because I have no idea what medication they're talking about. It's a written RX from Dr Amber Neil and I don't have a copy of it.

## 2021-08-27 NOTE — TELEPHONE ENCOUNTER
----- Message from Lázaro Stevens sent at 8/27/2021  1:42 PM EDT -----  Regarding: MD Russell/Telephone  General Message/Vendor Calls    Caller's first and last name: Charmayne Negro with [employer]. Reason for call: Requesting verbal.      Callback required yes/no and why: Yes      Best contact number(s): [phone] option 1 for [facility].       Details to clarify the request: Requesting verbal for cream.      Lázaro Stevens

## 2021-11-18 NOTE — PROGRESS NOTES
1. Have you been to the ER, urgent care clinic since your last visit? Hospitalized since your last visit? No    2. Have you seen or consulted any other health care providers outside of the [address] [place] [address] since your last visit? Include any pap smears or colon screening.  No Statement Selected

## 2021-11-22 RX ORDER — PRAVASTATIN SODIUM 40 MG/1
TABLET ORAL
Qty: 90 TABLET | Refills: 1 | Status: SHIPPED | OUTPATIENT
Start: 2021-11-22 | End: 2022-05-17

## 2021-12-02 DIAGNOSIS — Z11.59 SCREENING FOR VIRAL DISEASE: Primary | ICD-10-CM

## 2022-02-07 ENCOUNTER — OFFICE VISIT (OUTPATIENT)
Dept: INTERNAL MEDICINE CLINIC | Age: 72
End: 2022-02-07
Payer: MEDICARE

## 2022-02-07 VITALS
BODY MASS INDEX: 25.59 KG/M2 | RESPIRATION RATE: 15 BRPM | HEIGHT: 63 IN | HEART RATE: 91 BPM | DIASTOLIC BLOOD PRESSURE: 69 MMHG | TEMPERATURE: 98.2 F | WEIGHT: 144.4 LBS | OXYGEN SATURATION: 96 % | SYSTOLIC BLOOD PRESSURE: 108 MMHG

## 2022-02-07 DIAGNOSIS — I73.00 RAYNAUD'S PHENOMENON WITHOUT GANGRENE: ICD-10-CM

## 2022-02-07 DIAGNOSIS — Z13.6 SCREENING FOR CARDIOVASCULAR CONDITION: ICD-10-CM

## 2022-02-07 DIAGNOSIS — R92.2 DENSE BREAST TISSUE ON MAMMOGRAM: Primary | ICD-10-CM

## 2022-02-07 DIAGNOSIS — R61 NIGHT SWEATS: ICD-10-CM

## 2022-02-07 DIAGNOSIS — D32.9 MENINGIOMA (HCC): ICD-10-CM

## 2022-02-07 DIAGNOSIS — R20.9 COLD EXTREMITIES: ICD-10-CM

## 2022-02-07 DIAGNOSIS — R53.82 CHRONIC FATIGUE: ICD-10-CM

## 2022-02-07 DIAGNOSIS — Z96.652 HISTORY OF LEFT KNEE REPLACEMENT: ICD-10-CM

## 2022-02-07 PROCEDURE — 3017F COLORECTAL CA SCREEN DOC REV: CPT | Performed by: INTERNAL MEDICINE

## 2022-02-07 PROCEDURE — G8419 CALC BMI OUT NRM PARAM NOF/U: HCPCS | Performed by: INTERNAL MEDICINE

## 2022-02-07 PROCEDURE — G8536 NO DOC ELDER MAL SCRN: HCPCS | Performed by: INTERNAL MEDICINE

## 2022-02-07 PROCEDURE — G8510 SCR DEP NEG, NO PLAN REQD: HCPCS | Performed by: INTERNAL MEDICINE

## 2022-02-07 PROCEDURE — G0463 HOSPITAL OUTPT CLINIC VISIT: HCPCS | Performed by: INTERNAL MEDICINE

## 2022-02-07 PROCEDURE — 1101F PT FALLS ASSESS-DOCD LE1/YR: CPT | Performed by: INTERNAL MEDICINE

## 2022-02-07 PROCEDURE — G9899 SCRN MAM PERF RSLTS DOC: HCPCS | Performed by: INTERNAL MEDICINE

## 2022-02-07 PROCEDURE — 99214 OFFICE O/P EST MOD 30 MIN: CPT | Performed by: INTERNAL MEDICINE

## 2022-02-07 PROCEDURE — G8427 DOCREV CUR MEDS BY ELIG CLIN: HCPCS | Performed by: INTERNAL MEDICINE

## 2022-02-07 PROCEDURE — 1090F PRES/ABSN URINE INCON ASSESS: CPT | Performed by: INTERNAL MEDICINE

## 2022-02-07 PROCEDURE — G8399 PT W/DXA RESULTS DOCUMENT: HCPCS | Performed by: INTERNAL MEDICINE

## 2022-02-07 RX ORDER — VENLAFAXINE 25 MG/1
50 TABLET ORAL 2 TIMES DAILY
COMMUNITY
Start: 2022-02-02 | End: 2022-02-07 | Stop reason: ALTCHOICE

## 2022-02-07 RX ORDER — DICLOFENAC SODIUM 10 MG/G
GEL TOPICAL
COMMUNITY

## 2022-02-07 NOTE — PROGRESS NOTES
HISTORY OF PRESENT ILLNESS    Chief Complaint   Patient presents with    Results     Mammogram - dense tissue - further testing    Cold     Hands and feet    Labs     Check thyroids    Sweats     Night       Presents for follow-up      Review of Systems   All other systems reviewed and are negative, except as noted in HPI    Past Medical and Surgical History   has a past medical history of Angiomyolipoma of right kidney, AR (allergic rhinitis), Cardiac arrhythmia (), DDD (degenerative disc disease), lumbar, GERD (gastroesophageal reflux disease), Gluten intolerance, Hyperlipidemia (2009), Meningioma ([address] (2019), Osteopenia (2014), Peripheral arterial disease ([address] PFO (patent foramen ovale) (), Raynaud phenomenon, SCC (squamous cell carcinoma) (), TIA (transient ischemic attack) (2019), and Venous insufficiency of left leg (2019). She has no past medical history of Abuse, Anemia NEC, Asthma, Autoimmune disease ([address] CAD (coronary artery disease), Calculus of kidney, Chronic kidney disease, Chronic pain, Congestive heart failure, unspecified, COPD, Depression, Diabetes ([address] Headache(784.0), Hypertension, Liver disease, Psychotic disorder ([address] PUD (peptic ulcer disease), Seizures ([address] Thromboembolus ([address] Thyroid disease, or Trauma. has a past surgical history that includes hx gi; hx  section; vascular surgery procedure unlist; hx breast reduction (); hx tonsillectomy; hx heent; hx colonoscopy (2014); hx lumbar laminectomy (2017); hx win and bso; hx orthopaedic; hx orthopaedic; hx knee arthroscopy (Left); hx knee replacement (Left, 2012); hx endoscopy (2020); hx knee replacement (Left, 2021); and hx breast biopsy (Right, ). reports that she has never smoked. She has never used smokeless tobacco. She reports current alcohol use of about 1.7 standard drinks of alcohol per week. She reports that she does not use drugs.   family history includes Cancer in her mother, paternal aunt, and another family member; Dementia in her father; Diabetes in her paternal grandmother; Heart Attack in her mother; Heart Disease in her father and mother; Heart Failure in her father and mother; High Cholesterol in her father and mother; Hypertension in her father and mother; Other in her sister; Rashes/Skin Problems in her mother; Stroke in her father and mother; Thyroid Disease in her mother. Physical Exam   Nursing note and vitals reviewed. Blood pressure 108/69, pulse 91, temperature 98.2 °F (36.8 °C), temperature source Oral, resp. rate 15, height 5' 3\" (1.6 m), weight 144 lb 6.4 oz (65.5 kg), last menstrual period 01/01/2000, SpO2 96 %. Constitutional:  No distress. Eyes: Conjunctivae are normal.   Ears:  Hearing grossly intact  Cardiovascular: Normal rate. regular rhythm, no murmurs or gallops  No edema  Pulmonary/Chest: Effort normal.   CTAB  Musculoskeletal: moves all 4 extremities   Neurological: Alert and oriented to person, place, and time. Skin: No visible rash noted. Psychiatric: Normal mood and affect. Behavior is normal.     Assessment and Plan    Diagnoses and all orders for this visit:    1. Dense breast tissue on mammogram  Recent mammogram showed dense breast tissue and radiologist commented that it may obscure small masses. Will order MRI for screening for breast cancer. She does not have any known family history of breast cancer. No findings on self-exam of concern. -     MRI BREAST BI W WO CONT; Future    2. History of left knee replacement  Ongoing warmth and tenderness of left knee. It is not erythematous. Could she have some degree joint sepsis? She will follow up with orthopedics. Will check labs including inflammatory markers. 3. Cold extremities  Intermittent cold extremities which she says are different from her normal Raynaud's. Will check labs.   Unclear origin.  -     METABOLIC PANEL, COMPREHENSIVE; Future  -     T4, FREE; Future  -     TSH 3RD GENERATION; Future    4. Raynaud's phenomenon without gangrene    5. Night sweats  Intermittent night sweats. Consider infectious etiology. Consider knee replacement contributing. Check urine and inflammatory markers. Hormonal contribution seems less likely given she is 71.  -     URINALYSIS W/ RFLX MICROSCOPIC; Future  -     SED RATE (ESR); Future  -     C REACTIVE PROTEIN, QT; Future    6. Chronic fatigue  Significant moderate fatigue. Check CBC and B12. Unclear etiology. -     CBC WITH AUTOMATED DIFF; Future  -     VITAMIN B12; Future    7. Screening for cardiovascular condition  -     LIPID PANEL; Future    8. Meningioma [facility])  Currently asymptomatic  Calcified based on MRI July 2019. 1.3 cm.      lab results and schedule of future lab studies reviewed with patient  reviewed medications and side effects in detail    Return to clinic for further evaluation if new symptoms develop        Current Outpatient Medications   Medication Sig    diclofenac (VOLTAREN) 1 % gel Apply  to affected area two (2) times daily as needed for Pain. Left knee    pravastatin (PRAVACHOL) 40 mg tablet TAKE 1 TABLET BY MOUTH EVERY DAY    celecoxib (CELEBREX) 100 mg capsule TAKE 1 CAPSULE BY MOUTH TWICE A DAY (Patient taking differently: 200 mg.)    aspirin (ASPIRIN) 325 mg tablet Take 1 Tab by mouth daily. Bob Adilia, Apply 1 Applicator to affected area every Monday, Wednesday, Friday. Compounded HRT cream from [address]    fluticasone propionate [facility] ALLERGY RELIEF) 50 mcg/actuation nasal spray 2 Sprays by Both Nostrils route daily.  cholecalciferol (VITAMIN D3) 400 unit tab tablet Take 1 Tab by mouth daily.  esomeprazole (NEXIUM) 40 mg capsule Take 1 capsule by mouth daily.  loratadine (CLARITIN) 10 mg tablet Take 10 mg by mouth daily.  MULTIVITAMIN (MULTIPLE VITAMIN PO) Take 1 Tab by mouth daily.     estradioL (ESTRACE) 0.01 % (0.1 mg/gram) vaginal cream Insert  into vagina. (Patient not taking: Reported on 2/7/2022)    coenzyme Q-10 (CO Q-10) 200 mg capsule Take 1 Cap by mouth daily. (Patient not taking: Reported on 2/7/2022)     No current facility-administered medications for this visit.

## 2022-02-08 LAB
ALBUMIN SERPL-MCNC: 4.3 G/DL (ref 3.5–5)
ALBUMIN/GLOB SERPL: 1.6 {RATIO} (ref 1.1–2.2)
ALP SERPL-CCNC: 49 U/L (ref 45–117)
ALT SERPL-CCNC: 21 U/L (ref 12–78)
ANION GAP SERPL CALC-SCNC: 3 MMOL/L (ref 5–15)
APPEARANCE UR: CLEAR
AST SERPL-CCNC: 13 U/L (ref 15–37)
BASOPHILS # BLD: 0 K/UL (ref 0–0.1)
BASOPHILS NFR BLD: 1 % (ref 0–1)
BILIRUB SERPL-MCNC: 0.3 MG/DL (ref 0.2–1)
BILIRUB UR QL: NEGATIVE
BUN SERPL-MCNC: 18 MG/DL (ref 6–20)
BUN/CREAT SERPL: 23 (ref 12–20)
CALCIUM SERPL-MCNC: 9 MG/DL (ref 8.5–10.1)
CHLORIDE SERPL-SCNC: 105 MMOL/L (ref 97–108)
CHOLEST SERPL-MCNC: 199 MG/DL
CO2 SERPL-SCNC: 29 MMOL/L (ref 21–32)
COLOR UR: NORMAL
COMMENT, HOLDF: NORMAL
CREAT SERPL-MCNC: 0.77 MG/DL (ref 0.55–1.02)
CRP SERPL-MCNC: <0.29 MG/DL (ref 0–0.6)
DIFFERENTIAL METHOD BLD: ABNORMAL
EOSINOPHIL # BLD: 0 K/UL (ref 0–0.4)
EOSINOPHIL NFR BLD: 0 % (ref 0–7)
ERYTHROCYTE [DISTWIDTH] IN BLOOD BY AUTOMATED COUNT: 14.9 % (ref 11.5–14.5)
ERYTHROCYTE [SEDIMENTATION RATE] IN BLOOD: 4 MM/HR (ref 0–30)
GLOBULIN SER CALC-MCNC: 2.7 G/DL (ref 2–4)
GLUCOSE SERPL-MCNC: 87 MG/DL (ref 65–100)
GLUCOSE UR STRIP.AUTO-MCNC: NEGATIVE MG/DL
HCT VFR BLD AUTO: 39.6 % (ref 35–47)
HDLC SERPL-MCNC: 86 MG/DL
HDLC SERPL: 2.3 {RATIO} (ref 0–5)
HGB BLD-MCNC: 11.9 G/DL (ref 11.5–16)
HGB UR QL STRIP: NEGATIVE
IMM GRANULOCYTES # BLD AUTO: 0 K/UL (ref 0–0.04)
IMM GRANULOCYTES NFR BLD AUTO: 0 % (ref 0–0.5)
KETONES UR QL STRIP.AUTO: NEGATIVE MG/DL
LDLC SERPL CALC-MCNC: 80.8 MG/DL (ref 0–100)
LEUKOCYTE ESTERASE UR QL STRIP.AUTO: NEGATIVE
LYMPHOCYTES # BLD: 1.6 K/UL (ref 0.8–3.5)
LYMPHOCYTES NFR BLD: 25 % (ref 12–49)
MCH RBC QN AUTO: 29.9 PG (ref 26–34)
MCHC RBC AUTO-ENTMCNC: 30.1 G/DL (ref 30–36.5)
MCV RBC AUTO: 99.5 FL (ref 80–99)
MONOCYTES # BLD: 0.9 K/UL (ref 0–1)
MONOCYTES NFR BLD: 14 % (ref 5–13)
NEUTS SEG # BLD: 3.7 K/UL (ref 1.8–8)
NEUTS SEG NFR BLD: 60 % (ref 32–75)
NITRITE UR QL STRIP.AUTO: NEGATIVE
NRBC # BLD: 0 K/UL (ref 0–0.01)
NRBC BLD-RTO: 0 PER 100 WBC
PH UR STRIP: 5.5 [PH] (ref 5–8)
PLATELET # BLD AUTO: 265 K/UL (ref 150–400)
PMV BLD AUTO: 12.2 FL (ref 8.9–12.9)
POTASSIUM SERPL-SCNC: 4.8 MMOL/L (ref 3.5–5.1)
PROT SERPL-MCNC: 7 G/DL (ref 6.4–8.2)
PROT UR STRIP-MCNC: NEGATIVE MG/DL
RBC # BLD AUTO: 3.98 M/UL (ref 3.8–5.2)
SAMPLES BEING HELD,HOLD: NORMAL
SODIUM SERPL-SCNC: 137 MMOL/L (ref 136–145)
SP GR UR REFRACTOMETRY: 1.02 (ref 1–1.03)
T4 FREE SERPL-MCNC: 0.7 NG/DL (ref 0.8–1.5)
TRIGL SERPL-MCNC: 161 MG/DL (ref ?–150)
TSH SERPL DL<=0.05 MIU/L-ACNC: 1.53 UIU/ML (ref 0.36–3.74)
UR CULT HOLD, URHOLD: NORMAL
UROBILINOGEN UR QL STRIP.AUTO: 0.2 EU/DL (ref 0.2–1)
VIT B12 SERPL-MCNC: 939 PG/ML (ref 193–986)
VLDLC SERPL CALC-MCNC: 32.2 MG/DL
WBC # BLD AUTO: 6.2 K/UL (ref 3.6–11)

## 2022-02-23 ENCOUNTER — HOSPITAL ENCOUNTER (OUTPATIENT)
Dept: MRI IMAGING | Age: 72
Discharge: HOME | End: 2022-02-23
Attending: INTERNAL MEDICINE
Payer: MEDICARE

## 2022-02-23 DIAGNOSIS — R92.2 DENSE BREAST TISSUE ON MAMMOGRAM: ICD-10-CM

## 2022-02-23 PROCEDURE — 74011250636 HC RX REV CODE- 250/636: Performed by: INTERNAL MEDICINE

## 2022-02-23 PROCEDURE — A9585 GADOBUTROL INJECTION: HCPCS | Performed by: INTERNAL MEDICINE

## 2022-02-23 PROCEDURE — 77049 MRI BREAST C-+ W/CAD BI: CPT

## 2022-02-23 RX ADMIN — GADOBUTROL 7 ML: 604.72 INJECTION INTRAVENOUS at 14:40

## 2022-03-18 PROBLEM — Q21.12 PFO (PATENT FORAMEN OVALE): Status: ACTIVE | Noted: 2019-01-01

## 2022-03-19 PROBLEM — I49.9 CARDIAC ARRHYTHMIA: Status: ACTIVE | Noted: 2019-01-01

## 2022-03-19 PROBLEM — G45.9 TIA (TRANSIENT ISCHEMIC ATTACK): Status: ACTIVE | Noted: 2019-07-01

## 2022-03-19 PROBLEM — I87.2 VENOUS INSUFFICIENCY OF LEFT LEG: Status: ACTIVE | Noted: 2019-04-22

## 2022-03-19 PROBLEM — D32.9 MENINGIOMA (HCC): Status: ACTIVE | Noted: 2019-07-01

## 2022-03-19 PROBLEM — H53.9 VISUAL DISTURBANCE: Status: ACTIVE | Noted: 2019-06-30

## 2022-04-22 ENCOUNTER — HOSPITAL ENCOUNTER (EMERGENCY)
Age: 72
Discharge: HOME | End: 2022-04-22
Attending: EMERGENCY MEDICINE
Payer: MEDICARE

## 2022-04-22 ENCOUNTER — APPOINTMENT (OUTPATIENT)
Dept: CT IMAGING | Age: 72
End: 2022-04-22
Attending: EMERGENCY MEDICINE
Payer: MEDICARE

## 2022-04-22 ENCOUNTER — APPOINTMENT (OUTPATIENT)
Dept: GENERAL RADIOLOGY | Age: 72
End: 2022-04-22
Attending: EMERGENCY MEDICINE
Payer: MEDICARE

## 2022-04-22 VITALS
DIASTOLIC BLOOD PRESSURE: 71 MMHG | OXYGEN SATURATION: 96 % | RESPIRATION RATE: 17 BRPM | BODY MASS INDEX: 25.33 KG/M2 | WEIGHT: 143 LBS | HEART RATE: 82 BPM | SYSTOLIC BLOOD PRESSURE: 120 MMHG | TEMPERATURE: 97.9 F

## 2022-04-22 DIAGNOSIS — I48.91 ATRIAL FIBRILLATION WITH RAPID VENTRICULAR RESPONSE (HCC): Primary | ICD-10-CM

## 2022-04-22 LAB
ALBUMIN SERPL-MCNC: 4.2 G/DL (ref 3.5–5)
ALBUMIN/GLOB SERPL: 1 {RATIO} (ref 1.1–2.2)
ALP SERPL-CCNC: 88 U/L (ref 45–117)
ALT SERPL-CCNC: 21 U/L (ref 12–78)
ANION GAP SERPL CALC-SCNC: 10 MMOL/L (ref 5–15)
AST SERPL-CCNC: 22 U/L (ref 15–37)
ATRIAL RATE: 97 BPM
BASOPHILS # BLD: 0 K/UL (ref 0–0.1)
BASOPHILS NFR BLD: 0 % (ref 0–1)
BILIRUB SERPL-MCNC: 0.1 MG/DL (ref 0.2–1)
BNP SERPL-MCNC: 564 PG/ML (ref 0–125)
BUN SERPL-MCNC: 19 MG/DL (ref 6–20)
BUN/CREAT SERPL: 24 (ref 12–20)
CALCIUM SERPL-MCNC: 9.1 MG/DL (ref 8.5–10.1)
CALCULATED P AXIS, ECG09: 59 DEGREES
CALCULATED R AXIS, ECG10: 13 DEGREES
CALCULATED R AXIS, ECG10: 7 DEGREES
CALCULATED T AXIS, ECG11: 178 DEGREES
CALCULATED T AXIS, ECG11: 54 DEGREES
CHLORIDE SERPL-SCNC: 105 MMOL/L (ref 97–108)
CO2 SERPL-SCNC: 28 MMOL/L (ref 21–32)
CREAT SERPL-MCNC: 0.8 MG/DL (ref 0.55–1.02)
D DIMER PPP FEU-MCNC: 2.08 MG/L FEU (ref 0–0.65)
DIAGNOSIS, 93000: NORMAL
DIAGNOSIS, 93000: NORMAL
DIFFERENTIAL METHOD BLD: ABNORMAL
EOSINOPHIL # BLD: 0 K/UL (ref 0–0.4)
EOSINOPHIL NFR BLD: 0 % (ref 0–7)
ERYTHROCYTE [DISTWIDTH] IN BLOOD BY AUTOMATED COUNT: 14.4 % (ref 11.5–14.5)
GLOBULIN SER CALC-MCNC: 4.3 G/DL (ref 2–4)
GLUCOSE SERPL-MCNC: 180 MG/DL (ref 65–100)
HCT VFR BLD AUTO: 39.6 % (ref 35–47)
HGB BLD-MCNC: 12.2 G/DL (ref 11.5–16)
IMM GRANULOCYTES # BLD AUTO: 0.1 K/UL (ref 0–0.04)
IMM GRANULOCYTES NFR BLD AUTO: 0 % (ref 0–0.5)
LYMPHOCYTES # BLD: 1 K/UL (ref 0.8–3.5)
LYMPHOCYTES NFR BLD: 7 % (ref 12–49)
MAGNESIUM SERPL-MCNC: 2.1 MG/DL (ref 1.6–2.4)
MCH RBC QN AUTO: 28.4 PG (ref 26–34)
MCHC RBC AUTO-ENTMCNC: 30.8 G/DL (ref 30–36.5)
MCV RBC AUTO: 92.3 FL (ref 80–99)
MONOCYTES # BLD: 1.4 K/UL (ref 0–1)
MONOCYTES NFR BLD: 9 % (ref 5–13)
NEUTS SEG # BLD: 12.7 K/UL (ref 1.8–8)
NEUTS SEG NFR BLD: 84 % (ref 32–75)
NRBC # BLD: 0 K/UL (ref 0–0.01)
NRBC BLD-RTO: 0 PER 100 WBC
P-R INTERVAL, ECG05: 158 MS
PLATELET # BLD AUTO: 392 K/UL (ref 150–400)
PMV BLD AUTO: 11 FL (ref 8.9–12.9)
POTASSIUM SERPL-SCNC: 4 MMOL/L (ref 3.5–5.1)
PROT SERPL-MCNC: 8.5 G/DL (ref 6.4–8.2)
Q-T INTERVAL, ECG07: 278 MS
Q-T INTERVAL, ECG07: 356 MS
QRS DURATION, ECG06: 72 MS
QRS DURATION, ECG06: 74 MS
QTC CALCULATION (BEZET), ECG08: 445 MS
QTC CALCULATION (BEZET), ECG08: 452 MS
RBC # BLD AUTO: 4.29 M/UL (ref 3.8–5.2)
SODIUM SERPL-SCNC: 143 MMOL/L (ref 136–145)
TROPONIN-HIGH SENSITIVITY: 10 NG/L (ref 0–51)
TROPONIN-HIGH SENSITIVITY: 12 NG/L (ref 0–51)
VENTRICULAR RATE, ECG03: 154 BPM
VENTRICULAR RATE, ECG03: 97 BPM
WBC # BLD AUTO: 15.2 K/UL (ref 3.6–11)

## 2022-04-22 PROCEDURE — 96376 TX/PRO/DX INJ SAME DRUG ADON: CPT

## 2022-04-22 PROCEDURE — 99285 EMERGENCY DEPT VISIT HI MDM: CPT

## 2022-04-22 PROCEDURE — 74011250636 HC RX REV CODE- 250/636: Performed by: EMERGENCY MEDICINE

## 2022-04-22 PROCEDURE — 96374 THER/PROPH/DIAG INJ IV PUSH: CPT

## 2022-04-22 PROCEDURE — 80053 COMPREHEN METABOLIC PANEL: CPT

## 2022-04-22 PROCEDURE — 83880 ASSAY OF NATRIURETIC PEPTIDE: CPT

## 2022-04-22 PROCEDURE — 74011250637 HC RX REV CODE- 250/637: Performed by: EMERGENCY MEDICINE

## 2022-04-22 PROCEDURE — 85025 COMPLETE CBC W/AUTO DIFF WBC: CPT

## 2022-04-22 PROCEDURE — 84484 ASSAY OF TROPONIN QUANT: CPT

## 2022-04-22 PROCEDURE — 71045 X-RAY EXAM CHEST 1 VIEW: CPT

## 2022-04-22 PROCEDURE — 71275 CT ANGIOGRAPHY CHEST: CPT

## 2022-04-22 PROCEDURE — 83735 ASSAY OF MAGNESIUM: CPT

## 2022-04-22 PROCEDURE — 93005 ELECTROCARDIOGRAM TRACING: CPT

## 2022-04-22 PROCEDURE — 74011000250 HC RX REV CODE- 250: Performed by: EMERGENCY MEDICINE

## 2022-04-22 PROCEDURE — 36415 COLL VENOUS BLD VENIPUNCTURE: CPT

## 2022-04-22 PROCEDURE — 85379 FIBRIN DEGRADATION QUANT: CPT

## 2022-04-22 PROCEDURE — 74011000258 HC RX REV CODE- 258: Performed by: EMERGENCY MEDICINE

## 2022-04-22 PROCEDURE — 74011000636 HC RX REV CODE- 636: Performed by: EMERGENCY MEDICINE

## 2022-04-22 RX ORDER — METHYLPREDNISOLONE 4 MG/1
4 TABLET ORAL
COMMUNITY
End: 2022-05-17 | Stop reason: ALTCHOICE

## 2022-04-22 RX ORDER — DILTIAZEM HYDROCHLORIDE 120 MG/1
120 CAPSULE, COATED, EXTENDED RELEASE ORAL
Status: COMPLETED | OUTPATIENT
Start: 2022-04-22 | End: 2022-04-22

## 2022-04-22 RX ORDER — DILTIAZEM HYDROCHLORIDE 5 MG/ML
15 INJECTION INTRAVENOUS ONCE
Status: COMPLETED | OUTPATIENT
Start: 2022-04-22 | End: 2022-04-22

## 2022-04-22 RX ORDER — DILTIAZEM HYDROCHLORIDE 120 MG/1
120 CAPSULE, EXTENDED RELEASE ORAL DAILY
Qty: 30 CAPSULE | Refills: 0 | Status: SHIPPED | OUTPATIENT
Start: 2022-04-22 | End: 2022-05-17 | Stop reason: ALTCHOICE

## 2022-04-22 RX ADMIN — IOPAMIDOL 100 ML: 755 INJECTION, SOLUTION INTRAVENOUS at 02:58

## 2022-04-22 RX ADMIN — SODIUM CHLORIDE 1000 ML: 9 INJECTION, SOLUTION INTRAVENOUS at 02:13

## 2022-04-22 RX ADMIN — DILTIAZEM HYDROCHLORIDE 15 MG: 5 INJECTION INTRAVENOUS at 02:11

## 2022-04-22 RX ADMIN — DILTIAZEM HYDROCHLORIDE 120 MG: 120 CAPSULE, COATED, EXTENDED RELEASE ORAL at 02:57

## 2022-04-22 RX ADMIN — SODIUM CHLORIDE 2.5 MG/HR: 900 INJECTION, SOLUTION INTRAVENOUS at 02:35

## 2022-04-22 RX ADMIN — SODIUM CHLORIDE 2.5 MG/HR: 900 INJECTION, SOLUTION INTRAVENOUS at 03:54

## 2022-04-22 NOTE — ED TRIAGE NOTES
Pt ambulates to treatment area without any gait disturbances. Pt states that around 0030 she started having chest tightness and palpitations. Pt states that her HR was tachy and her last BP reading was 131/100 before heading in. Pt states that she is on a Medrol pack and just finished day 2 and is not sure if this may be a contributing factor.

## 2022-04-22 NOTE — ED NOTES
The patient was discharged home in stable condition. The patient is alert and oriented, in no respiratory distress and discharge vital signs obtained. The patient's diagnosis, condition and treatment were explained. The patient expressed understanding. Prescriptions e-scribed to pharmacy. No work/school note given. A discharge plan has been developed. A  was not involved in the process. Aftercare instructions were given. Pt ambulatory out of the ED.

## 2022-04-22 NOTE — DISCHARGE INSTRUCTIONS
Return to the emergency department if your symptoms are returning or worsening, if you have severe chest pain, or very short of breath, become significantly lightheaded or pass out, or develop any other symptoms you find concerning. In the meantime you should follow-up with your primary care doctor and also call your cardiologist today to let them know that you were in A. fib and let your cardiologist know the medications I have started you on. He should get a follow-up appointment with your cardiologist as soon as possible as well.

## 2022-04-22 NOTE — ED PROVIDER NOTES
HPI   69-year-old female with a past medical history of hyperlipidemia, TIAs, GERD, and prior evaluation by Holter monitor due to possible arrhythmias who presents to the emergency department due to palpitations and chest pressure. Patient says that at about 1230 she woke up and was having a tightness in her chest as well as palpitations and a rapid heart rate. She checked her blood pressure and put a pulse ox on and her heart rate was quite elevated so she came to the emergency department. She is not short of breath but does state that she has some discomfort when she takes deep breaths. She denies leg swelling. She denies a history of heart failure. She denies diaphoresis, nausea, or vomiting. She has no known history of A. fib and is not on blood thinners. She also states that she was recently put on a Medrol Dosepak for possible tendinitis of her left wrist and is wondering if this is causing her symptoms tonight. Past Medical History:   Diagnosis Date    Angiomyolipoma of right kidney     3.3 cm upper pole 11/8/15.  AR (allergic rhinitis)     Cardiac arrhythmia 2019    Dr. Judy Campos.  has implanted event monitor    DDD (degenerative disc disease), lumbar     Dr. Lupe Allen. L3-L4, L4-L5 injections 2013. radiates to left knee. MRI 10/2015 mild DJD, no clear stenosis    GERD (gastroesophageal reflux disease)     Gluten intolerance     History of left knee replacement     Hyperlipidemia 1/8/2009    Meningioma ([facility] [address] 07/01/2019    1.3 cm right parietal    Osteopenia 04/2014    mild 4/20/14 T-1.6 L fem neck. Stable 2/2020 T -1.8 L fem neck    Peripheral arterial disease (HCC)     per Dr. Denzel Pa of old arterial clots\"    PFO (patent foramen ovale) 2019    Dr Judy Campos at [facility], Dr. Maegan Parrish phenomenon     SCC (squamous cell carcinoma) 2014    right forearm. Dr. Caitlyn Miner TIA (transient ischemic attack) 07/01/2019    episode of visual change, aphasia.     Venous insufficiency of left leg 2019    scarring of femoral and politeal veins w reflux of L CF, fem, pop. Dr Jenna Montgomery. needs anticoag for surgery    Vestibular migraine        Past Surgical History:   Procedure Laterality Date    HX BREAST BIOPSY Right 2011    HX BREAST REDUCTION  2009    HX  SECTION      2    HX COLONOSCOPY  2014    2 small polyps. Dr Alverto Ellison HX ENDOSCOPY  2020    Dr. Hitchcock Pac.   dilation esophagus, otherwise normal    HX GI      perianal cyst    HX HEENT      face lift    HX KNEE ARTHROSCOPY Left     HX KNEE REPLACEMENT Left 2012    Dr. Aaron Black Left 2021    at [address],  revision    HX LUMBAR LAMINECTOMY  2017    L1-L2 Dr Natalie Park    HX ORTHOPAEDIC      L shoulder, L1, L2 laminectomy    HX ORTHOPAEDIC      L ankle surgery    HX FAN AND BSO      HX TONSILLECTOMY      VASCULAR SURGERY PROCEDURE UNLIST      venous stripping R         Family History:   Problem Relation Age of Onset    Heart Failure Mother     Hypertension Mother     Stroke Mother     High Cholesterol Mother     Thyroid Disease Mother     Rashes/Skin Problems Mother     Cancer Mother         uterine    Heart Disease Mother         premature    Heart Attack Mother     Hypertension Father     Heart Failure Father     High Cholesterol Father     Dementia Father     Heart Disease Father     Stroke Father     Other Sister         deep venous thromboses and Pulmonary embolus    Cancer Paternal Aunt         colon    Diabetes Paternal Grandmother     Cancer Other         colon       Social History     Socioeconomic History    Marital status:      Spouse name: Not on file    Number of children: Not on file    Years of education: Not on file    Highest education level: Not on file   Occupational History    Not on file   Tobacco Use    Smoking status: Never Smoker    Smokeless tobacco: Never Used   Substance and Sexual Activity    Alcohol use: Yes     Alcohol/week: 1.7 standard drinks     Types: 2 Glasses of wine per week    Drug use: No    Sexual activity: Never   Other Topics Concern    Not on file   Social History Narrative    Not on file     Social Determinants of Health     Financial Resource Strain:     Difficulty of Paying Living Expenses: Not on file   Food Insecurity:     Worried About Running Out of Food in the Last Year: Not on file    Javi of Food in the Last Year: Not on file   Transportation Needs:     Lack of Transportation (Medical): Not on file    Lack of Transportation (Non-Medical): Not on file   Physical Activity:     Days of Exercise per Week: Not on file    Minutes of Exercise per Session: Not on file   Stress:     Feeling of Stress : Not on file   Social Connections:     Frequency of Communication with Friends and Family: Not on file    Frequency of Social Gatherings with Friends and Family: Not on file    Attends Zoroastrianism Services: Not on file    Active Member of [address] [place] [address] or Organizations: Not on file    Attends Club or Organization Meetings: Not on file    Marital Status: Not on file   Intimate Partner Violence:     Fear of Current or Ex-Partner: Not on file    Emotionally Abused: Not on file    Physically Abused: Not on file    Sexually Abused: Not on file   Housing Stability:     Unable to Pay for Housing in the Last Year: Not on file    Number of [place] in the Last Year: Not on file    Unstable Housing in the Last Year: Not on file         ALLERGIES: Alfentanil, Levaquin [levofloxacin], Morphine, Percocet [oxycodone-acetaminophen], and Tramadol    Review of Systems   A complete review of systems was performed and all systems reviewed are negative less otherwise documented in the HPI.     Vitals:    04/22/22 0159 04/22/22 0211   BP: 105/83 (!) 127/100   Pulse: (!) 132 (!) 145   Resp: 21    Temp: 97.8 °F (36.6 °C)    SpO2: 95%    Weight: 64.9 kg (143 lb)             Physical Exam  Constitutional: Comments: Resting comfortably no acute distress. No diaphoresis noted. HENT:      Mouth/Throat:      Comments: Moist mucous membranes  Eyes:      General: No scleral icterus. Extraocular Movements: Extraocular movements intact. Neck:      Comments: Trachea midline. No JVD  Cardiovascular:      Comments: Tachycardic. Irregularly irregular rhythm. No appreciable murmurs. 2+ radial pulses bilaterally  Pulmonary:      Effort: Pulmonary effort is normal. No respiratory distress. Breath sounds: Normal breath sounds. No wheezing or rales. Abdominal:      General: There is no distension. Palpations: Abdomen is soft. Tenderness: There is no abdominal tenderness. Musculoskeletal:         General: Normal range of motion. Right lower leg: No edema. Left lower leg: No edema. Skin:     General: Skin is warm and dry. Neurological:      Comments: Awake and alert. Speech normal.  GCS 15. MDM   21-year-old female presents with the above chief complaint. Vitals notable for tachycardia with a heart rate in the 130s and 140s. EKG shows atrial fibrillation with rapid ventricular response. Rate is 154 and QTC is 445 with no concerning ST elevations or depressions. Otherwise her exam is unremarkable. She is not in acute distress. She appears euvolemic. Lungs are clear. Patient is going to be given a bolus of Cardizem for rate control. Labs including cardiac enzymes and D-dimer will be obtained given the patient's chest tightness. Initially the Cardizem bolus did not improve the patient's heart rate and she was placed on a drip. After a very short time of being on the drip she converted to normal sinus rhythm. EKG obtained at that time shows normal sinus rhythm with a rate of 97 QTC of 452 with no concerning ST elevations or depressions. Drip was turned off and she was given some oral Cardizem. Patient did have a few episodes of recurrent A. fib with RVR.   I was considering putting her back on a drip, but she quickly returned to normal sinus rhythm. I watched her for about an hour and she remained in normal sinus rhythm. This seems  Time she has symptoms as when she is A. fib. Otherwise her labs are fairly unremarkable. Her D-dimer was elevated, but CT PE does not show any acute processes. Given her improvement and otherwise reassuring work-up she was deemed safe for discharge. She will be prescribed diltiazem as well as Eliquis given her increased risk for stroke in the setting of A. fib. She follows with a cardiologist at [facility] who she will be contacting today to schedule follow-up appointment. Patient discharged in stable condition.     Procedures

## 2022-05-11 ENCOUNTER — TELEPHONE (OUTPATIENT)
Dept: INTERNAL MEDICINE CLINIC | Age: 72
End: 2022-05-11

## 2022-05-11 NOTE — TELEPHONE ENCOUNTER
Per Alessio Yang with [facility] , patient needs a BECCA with in one week, patient has quiet afew dx codes one which is pericarditis and a lot of pending work ups.      Requesting to have the nurse reach out to patient directly and schedule BECCA

## 2022-05-11 NOTE — TELEPHONE ENCOUNTER
Spoke with Michael/Spouse (HIPPA VERIFIED) and updated that patient is scheduled for 5/17/22 @ 0920 AM for her BECCA appt with Dr. Stefany Sosa. He states understanding and states he will have patient here. Grateful for the call.

## 2022-05-17 ENCOUNTER — OFFICE VISIT (OUTPATIENT)
Dept: INTERNAL MEDICINE CLINIC | Age: 72
End: 2022-05-17
Payer: MEDICARE

## 2022-05-17 VITALS
WEIGHT: 138.2 LBS | HEIGHT: 63 IN | OXYGEN SATURATION: 97 % | TEMPERATURE: 98.3 F | BODY MASS INDEX: 24.49 KG/M2 | HEART RATE: 91 BPM | DIASTOLIC BLOOD PRESSURE: 75 MMHG | RESPIRATION RATE: 15 BRPM | SYSTOLIC BLOOD PRESSURE: 117 MMHG

## 2022-05-17 DIAGNOSIS — R61 NIGHT SWEATS: ICD-10-CM

## 2022-05-17 DIAGNOSIS — R53.83 FATIGUE, UNSPECIFIED TYPE: ICD-10-CM

## 2022-05-17 DIAGNOSIS — I48.91 ATRIAL FIBRILLATION, UNSPECIFIED TYPE (HCC): ICD-10-CM

## 2022-05-17 DIAGNOSIS — I82.432 DEEP VEIN THROMBOSIS (DVT) OF POPLITEAL VEIN OF LEFT LOWER EXTREMITY, UNSPECIFIED CHRONICITY (HCC): ICD-10-CM

## 2022-05-17 DIAGNOSIS — R07.9 CHEST PAIN, UNSPECIFIED TYPE: ICD-10-CM

## 2022-05-17 DIAGNOSIS — I30.9 ACUTE PERICARDITIS, UNSPECIFIED TYPE: Primary | ICD-10-CM

## 2022-05-17 DIAGNOSIS — R76.8 RHEUMATOID FACTOR POSITIVE: ICD-10-CM

## 2022-05-17 DIAGNOSIS — D64.9 ANEMIA, UNSPECIFIED TYPE: ICD-10-CM

## 2022-05-17 DIAGNOSIS — R76.8 POSITIVE ANA (ANTINUCLEAR ANTIBODY): ICD-10-CM

## 2022-05-17 PROCEDURE — 3017F COLORECTAL CA SCREEN DOC REV: CPT | Performed by: INTERNAL MEDICINE

## 2022-05-17 PROCEDURE — G9899 SCRN MAM PERF RSLTS DOC: HCPCS | Performed by: INTERNAL MEDICINE

## 2022-05-17 PROCEDURE — 1101F PT FALLS ASSESS-DOCD LE1/YR: CPT | Performed by: INTERNAL MEDICINE

## 2022-05-17 PROCEDURE — 1090F PRES/ABSN URINE INCON ASSESS: CPT | Performed by: INTERNAL MEDICINE

## 2022-05-17 PROCEDURE — G0463 HOSPITAL OUTPT CLINIC VISIT: HCPCS | Performed by: INTERNAL MEDICINE

## 2022-05-17 PROCEDURE — 99215 OFFICE O/P EST HI 40 MIN: CPT | Performed by: INTERNAL MEDICINE

## 2022-05-17 PROCEDURE — G8510 SCR DEP NEG, NO PLAN REQD: HCPCS | Performed by: INTERNAL MEDICINE

## 2022-05-17 PROCEDURE — G8399 PT W/DXA RESULTS DOCUMENT: HCPCS | Performed by: INTERNAL MEDICINE

## 2022-05-17 PROCEDURE — G8420 CALC BMI NORM PARAMETERS: HCPCS | Performed by: INTERNAL MEDICINE

## 2022-05-17 PROCEDURE — G8536 NO DOC ELDER MAL SCRN: HCPCS | Performed by: INTERNAL MEDICINE

## 2022-05-17 PROCEDURE — G8427 DOCREV CUR MEDS BY ELIG CLIN: HCPCS | Performed by: INTERNAL MEDICINE

## 2022-05-17 RX ORDER — LANOLIN ALCOHOL/MO/W.PET/CERES
1000 CREAM (GRAM) TOPICAL DAILY
COMMUNITY

## 2022-05-17 RX ORDER — AMIODARONE HYDROCHLORIDE 200 MG/1
200 TABLET ORAL DAILY
COMMUNITY
Start: 2022-05-11

## 2022-05-17 RX ORDER — PRAVASTATIN SODIUM 20 MG/1
20 TABLET ORAL DAILY
Qty: 90 TABLET | Refills: 1
Start: 2022-05-17

## 2022-05-17 NOTE — PROGRESS NOTES
HISTORY OF PRESENT ILLNESS    Chief Complaint   Patient presents with   [facility] Follow Up     [facility] - heart condition f/u m- discuss new dx    Medication Evaluation     Discuss changes       Presents for follow-up of [facility] hospitalization 4/26/22 - 5/11/22. Records reviewed. She initially presented to [facility] emergency room on April 22 with palpitations and chest pressure. Woke up with symptoms. Chest pressure worse with deep breaths. Was found to have atrial fibrillation with RVR, rate 154. She was given Cardizem which did not improve her heart rate and she was then placed on a drip. She converted to sinus rhythm with no concerning ST elevations or depressions. D-dimer was elevated, but CT for pulmonary embolism showed no process. She was prescribed diltiazem and Eliquis and discharged home. Asked to follow-up with cardiology at [address] [place] [address]. Admitted to PCU and was found to have pericarditis. In addition to the acute symptoms above, she also has been having significant night sweats and heat intolerance. Status post revision of right knee July 27, 2021 at [facility]. Started amiodarone for Afib, colchicine for pericarditis, stopped diltiazem. Echocardiogram on May 9 showed small pericardial effusion with no hemodynamic compromise. Ejection fraction 55 to 60% with no wall motion normalities. Mildly thickened mitral valve    C-reactive protein 20.5 (normal is less than 0.5), sedimentation rate 34  Lyme antibodies negative, angiotensin converting enzyme normal.  QuantiFERON gold tuberculosis testing negative. HIV negative. DONA +1: 80 speckled pattern. Rheumatoid factor mildly positive at 24, but normal CCP antibody. Double-stranded DNA negative. German/RNP negative.   Centromere B antibody negative    CT chest with contrast on April 29 showed small to moderate size pericardial effusion with pericardial thickening, small left pleural effusion, trace pulmonary edema, reactive mediastinal lymphadenopathy, right lower lobe lingular scarring, scattered subcentimeter pulmonary nodules punctate and 3 mm    US LLE 5/6 showed Left leg DVT of indeterminate age in the distal popliteal vein    PET scan of whole body on May 5, 2022 showed mild asymmetry in the thyroid gland right greater than left with calcification in the right lobe. Prominent uptake sliding left knee prosthesis. No other focal abnormality. Liver ultrasound May 1 showed hepatic parenchymal disease with no focal liver lesion. No ascites. She was found to be anemic in the hospital.  Unclear etiology. Labs, including those done in the emergency room here on April 22 showed no previous history of anemia. Last CBC on May 10, 2022 showed WBC 5.8, hemoglobin 9.4, platelet 248. Fecal occult blood testing negative. Immunofixation with no evidence of monoclonal gammopathy, but mildly elevated kappa to lambda ratio. Total iron normal at 56. Ferritin elevated, but so were inflammatory markers. Marrow bx in hospital   This study reveals approximately 2% of total cells are lymphocytes. Of these cells, 24% are B cells and 52% are T cells. B cells show no aberrant antigen expression and a polyclonal kappa:lambda ratio (3.27:1). T cells are immunophenotypically unremarkable with a CD4:CD8 ratio within normal limits. No overt increase in NK cells or plasma cells. Hematogones are absent. CD34 positive blasts are not increased. T cells are immunophenotypically unremarkable with a CD4:CD8 ratio within normal limits. No overt increase in NK cells or plasma cells. Hematogones are absent. CD34 positive blasts are not increased.         Review of Systems   All other systems reviewed and are negative, except as noted in HPI    Past Medical and Surgical History   has a past medical history of A-fib ([address] (2022), Angiomyolipoma of right kidney, AR (allergic rhinitis), Cardiac arrhythmia (2019), DDD (degenerative disc disease), lumbar, GERD (gastroesophageal reflux disease), Gluten intolerance, History of left knee replacement, Hyperlipidemia (2009), Meningioma ([address] (2019), Osteopenia (2014), Peripheral arterial disease ([address] PFO (patent foramen ovale) (), Raynaud phenomenon, SCC (squamous cell carcinoma) (), TIA (transient ischemic attack) (2019), Venous insufficiency of left leg (2019), and Vestibular migraine. She has no past medical history of Abuse, Anemia NEC, Asthma, Autoimmune disease ([address] CAD (coronary artery disease), Calculus of kidney, Chronic kidney disease, Chronic pain, Congestive heart failure, unspecified, COPD, Depression, Diabetes ([address] Headache(784.0), Hypertension, Liver disease, Psychotic disorder ([address] PUD (peptic ulcer disease), Seizures ([address] Thromboembolus ([address] Thyroid disease, or Trauma. has a past surgical history that includes hx gi; hx  section; vascular surgery procedure unlist; hx breast reduction (); hx tonsillectomy; hx heent; hx colonoscopy (2014); hx lumbar laminectomy (2017); hx win and bso; hx orthopaedic; hx orthopaedic; hx knee arthroscopy (Left); hx knee replacement (Left, 2012); hx endoscopy (2020); hx knee replacement (Left, 2021); and hx breast biopsy (Right, ). reports that she has never smoked. She has never used smokeless tobacco. She reports current alcohol use of about 1.7 standard drinks of alcohol per week. She reports that she does not use drugs. family history includes Cancer in her mother, paternal aunt, and another family member; Dementia in her father; Diabetes in her paternal grandmother; Heart Attack in her mother; Heart Disease in her father and mother; Heart Failure in her father and mother; High Cholesterol in her father and mother; Hypertension in her father and mother; Other in her sister; Rashes/Skin Problems in her mother; Stroke in her father and mother; Thyroid Disease in her mother.     Physical Exam   Nursing note and vitals reviewed. Blood pressure 117/75, pulse 91, temperature 98.3 °F (36.8 °C), temperature source Oral, resp. rate 15, height 5' 3\" (1.6 m), weight 138 lb 3.2 oz (62.7 kg), last menstrual period 01/01/2000, SpO2 97 %. Constitutional:  No distress. Eyes: Conjunctivae are normal.   Ears:  Hearing grossly intact  Cardiovascular: Normal rate. regular rhythm, no murmurs or gallops  No edema  Pulmonary/Chest: Effort normal.   CTAB  Musculoskeletal: moves all 4 extremities   Neurological: Alert and oriented to person, place, and time. Skin: No visible rash noted. Psychiatric: Normal mood and affect. Behavior is normal.     Assessment and Plan    Diagnoses and all orders for this visit:    1. Acute pericarditis, unspecified type  Unclear etiology. No clear infectious etiology but is consulting with infectious disease. No obvious oncologic abnormality based on PET scan, recent breast MRI and colonoscopy August 2018. No hematologic abnormality based on the anemia is somewhat unexplained still. Symptomatically improved s/p colchicine. Following up with cardiology 6/13 repeat echo. 2. Chest pain, unspecified type  Very likely secondary to pericarditis improving. No evidence pulmonary embolism. 3. Positive DONA (antinuclear antibody)  4. Rheumatoid factor positive  Positive DONA and rheumatoid factor, unclear etiology. Mixed connective tissue disorder of some sort may be responsible for her pericarditis, platelets, elevated inflammatory markers. Consulting with infectious disease and with hematology first.  May consider rheumatologic consultation. As far as I can tell, she has no other overt signs and symptoms of rheumatologic disease other than anemia. 5. Anemia, unspecified type  Unclear etiology. Relatively acute. No evidence of bleeding. Following up with hematology on May 23. As far as I can tell, her bone marrow biopsy does not show any signs of malignancy.   She was informed about this during his visit today, but I have given her caveat that final read on this will come from her hematologist.  No overt episodes of bleeding. 6. Night sweats  Very likely secondary to inflammatory process causing pericarditis. No evidence of infection based on PET scan. 7. Fatigue, unspecified type  Nonspecific. Anemia contributing. Suspect inflammatory etiology. 8. Atrial fibrillation, unspecified type [facility])  Currently is asymptomatic. She has close follow-up with electrophysiology next week. Continuing amiodarone and Eliquis for now. 9. Deep vein thrombosis (DVT) of popliteal vein of left lower extremity, unspecified chronicity (HCC)  Unclear duration. Relatively asymptomatic. On Eliquis now. No evidence of pulmonary embolism. Following up with hematology. Consider hypercoagulable work-up. Antiphospholipid? Follow[facility] appointments  [facility]  5/23 EP Dr. Darnell Kennedy  6/13 ID Dr. Vicky Patiño, repeat echo  7/6 MRI brain, neurosurgery 7/6    lab results and schedule of future lab studies reviewed with patient  reviewed medications and side effects in detail    Return to clinic for further evaluation if new symptoms develop        Current Outpatient Medications   Medication Sig    amiodarone (CORDARONE) 200 mg tablet Take 200 mg by mouth daily.  cyanocobalamin (Vitamin B-12) 1,000 mcg tablet Take 1,000 mcg by mouth daily.  pravastatin (PRAVACHOL) 20 mg tablet Take 1 Tablet by mouth daily.  apixaban (Eliquis) 5 mg tablet Take 1 Tablet by mouth two (2) times a day for 30 days.  venlafaxine (EFFEXOR) 37.5 mg tablet Take 37.5 mg by mouth daily.  diclofenac (VOLTAREN) 1 % gel Apply  to affected area two (2) times daily as needed for Pain. Left knee   [place], Apply 1 Applicator to affected area every Monday, Wednesday, Friday.  Compounded HRT cream from [address]    fluticasone propionate [facility]) 50 mcg/actuation nasal spray 2 Sprays by Both Nostrils route daily.  cholecalciferol, vitamin D3, (Vitamin D3) 100 mcg (4,000 unit) cap Take 4,000 Units by mouth daily.  esomeprazole (NEXIUM) 40 mg capsule Take 1 capsule by mouth daily.  loratadine (CLARITIN) 10 mg tablet Take 10 mg by mouth daily.  MULTIVITAMIN (MULTIPLE VITAMIN PO) Take 1 Tab by mouth daily. No current facility-administered medications for this visit.

## 2022-05-18 RX ORDER — COLCHICINE 0.6 MG/1
0.6 TABLET ORAL 2 TIMES DAILY
Qty: 30 TABLET | Refills: 0
Start: 2022-05-18 | End: 2022-05-18 | Stop reason: ALTCHOICE

## 2022-06-01 RX ORDER — PRAVASTATIN SODIUM 40 MG/1
TABLET ORAL
Qty: 90 TABLET | Refills: 1 | OUTPATIENT
Start: 2022-06-01

## 2022-10-25 ENCOUNTER — OFFICE VISIT (OUTPATIENT)
Dept: INTERNAL MEDICINE CLINIC | Age: 72
End: 2022-10-25
Payer: MEDICARE

## 2022-10-25 VITALS
SYSTOLIC BLOOD PRESSURE: 110 MMHG | WEIGHT: 142 LBS | DIASTOLIC BLOOD PRESSURE: 73 MMHG | OXYGEN SATURATION: 97 % | TEMPERATURE: 97.9 F | HEART RATE: 77 BPM | RESPIRATION RATE: 16 BRPM | BODY MASS INDEX: 25.16 KG/M2 | HEIGHT: 63 IN

## 2022-10-25 DIAGNOSIS — R59.0 MEDIASTINAL LYMPHADENOPATHY: ICD-10-CM

## 2022-10-25 DIAGNOSIS — R91.1 PULMONARY NODULE: ICD-10-CM

## 2022-10-25 DIAGNOSIS — J90 PLEURAL EFFUSION: ICD-10-CM

## 2022-10-25 DIAGNOSIS — Z86.79 HISTORY OF PERICARDITIS: ICD-10-CM

## 2022-10-25 DIAGNOSIS — E04.9 THYROID ENLARGEMENT: ICD-10-CM

## 2022-10-25 DIAGNOSIS — R76.8 RHEUMATOID FACTOR POSITIVE: ICD-10-CM

## 2022-10-25 DIAGNOSIS — R63.4 WEIGHT LOSS: ICD-10-CM

## 2022-10-25 DIAGNOSIS — R76.8 ANA POSITIVE: ICD-10-CM

## 2022-10-25 DIAGNOSIS — R61 NIGHT SWEATS: Primary | ICD-10-CM

## 2022-10-25 PROCEDURE — 3017F COLORECTAL CA SCREEN DOC REV: CPT | Performed by: INTERNAL MEDICINE

## 2022-10-25 PROCEDURE — G0463 HOSPITAL OUTPT CLINIC VISIT: HCPCS | Performed by: INTERNAL MEDICINE

## 2022-10-25 PROCEDURE — G8419 CALC BMI OUT NRM PARAM NOF/U: HCPCS | Performed by: INTERNAL MEDICINE

## 2022-10-25 PROCEDURE — G8536 NO DOC ELDER MAL SCRN: HCPCS | Performed by: INTERNAL MEDICINE

## 2022-10-25 PROCEDURE — G8427 DOCREV CUR MEDS BY ELIG CLIN: HCPCS | Performed by: INTERNAL MEDICINE

## 2022-10-25 PROCEDURE — G9899 SCRN MAM PERF RSLTS DOC: HCPCS | Performed by: INTERNAL MEDICINE

## 2022-10-25 PROCEDURE — G8510 SCR DEP NEG, NO PLAN REQD: HCPCS | Performed by: INTERNAL MEDICINE

## 2022-10-25 PROCEDURE — 1101F PT FALLS ASSESS-DOCD LE1/YR: CPT | Performed by: INTERNAL MEDICINE

## 2022-10-25 PROCEDURE — 99214 OFFICE O/P EST MOD 30 MIN: CPT | Performed by: INTERNAL MEDICINE

## 2022-10-25 PROCEDURE — G8399 PT W/DXA RESULTS DOCUMENT: HCPCS | Performed by: INTERNAL MEDICINE

## 2022-10-25 PROCEDURE — 1090F PRES/ABSN URINE INCON ASSESS: CPT | Performed by: INTERNAL MEDICINE

## 2022-10-25 RX ORDER — COLCHICINE 0.6 MG/1
0.3 TABLET ORAL DAILY
Qty: 45 TABLET | Refills: 1
Start: 2022-10-25

## 2022-10-25 NOTE — PROGRESS NOTES
HISTORY OF PRESENT ILLNESS    Chief Complaint   Patient presents with    Sweats     Night sweats        Presents for follow-up    Presents with a 1 month history of recurrent significant night sweats. She states that she is absolutely soaked in her entire chest and legs when she wakes up. Denies any significant weight loss. She does have a mild cough. She does have a history of abnormal chest imaging at [facility]. No follow-up as of yet. Wt Readings from Last 3 Encounters:   10/25/22 142 lb (64.4 kg)   05/17/22 138 lb 3.2 oz (62.7 kg)   04/22/22 143 lb (64.9 kg)     Admitted to [facility] 5/2022. and was found to have pericarditis. Reported significant night sweats and heat intolerance. Started amiodarone for Afib, colchicine for pericarditis, stopped diltiazem. Echocardiogram on May 9 showed small pericardial effusion with no hemodynamic compromise. Ejection fraction 55 to 60% with no wall motion normalities. Mildly thickened mitral valve     C-reactive protein 20.5 (normal is less than 0.5), sedimentation rate 34  Lyme antibodies negative, angiotensin converting enzyme normal.  QuantiFERON gold tuberculosis testing negative. HIV negative. DONA +1: 80 speckled pattern. Rheumatoid factor mildly positive at 24, but normal CCP antibody. Double-stranded DNA negative. German/RNP negative. Centromere B antibody negative     CT chest with contrast on April 29 showed small to moderate size pericardial effusion with pericardial thickening, small left pleural effusion, trace pulmonary edema, reactive mediastinal lymphadenopathy, right lower lobe lingular scarring, scattered subcentimeter pulmonary nodules punctate and 3 mm     US LLE 5/6 showed Left leg DVT of indeterminate age in the distal popliteal vein     PET scan of whole body on May 5, 2022 showed mild asymmetry in the thyroid gland right greater than left with calcification in the right lobe. Prominent uptake sliding left knee prosthesis.   No other focal abnormality. Liver ultrasound May 1 showed hepatic parenchymal disease with no focal liver lesion. No ascites. She was found to be anemic in the hospital.  Unclear etiology. Labs, including those done in the emergency room here on April 22 showed no previous history of anemia. Last CBC on May 10, 2022 showed WBC 5.8, hemoglobin 9.4, platelet 644. Fecal occult blood testing negative. Immunofixation with no evidence of monoclonal gammopathy, but mildly elevated kappa to lambda ratio. Total iron normal at 56. Ferritin elevated, but so were inflammatory markers. Marrow bx in hospital   This study reveals approximately 2% of total cells are lymphocytes. Of these cells, 24% are B cells and 52% are T cells. B cells show no aberrant antigen expression and a polyclonal kappa:lambda ratio (3.27:1). T cells are immunophenotypically unremarkable with a CD4:CD8 ratio within normal limits. No overt increase in NK cells or plasma cells. Hematogones are absent. CD34 positive blasts are not increased. T cells are immunophenotypically unremarkable with a CD4:CD8 ratio within normal limits. No overt increase in NK cells or plasma cells. Hematogones are absent. CD34 positive blasts are not increased.           Review of Systems   All other systems reviewed and are negative, except as noted in HPI     Past Medical and Surgical History   has a past medical history of A-fib ([address] (2022), Angiomyolipoma of right kidney, AR (allergic rhinitis), Cardiac arrhythmia (2019), DDD (degenerative disc disease), lumbar, GERD (gastroesophageal reflux disease), Gluten intolerance, History of left knee replacement, Hyperlipide          Review of Systems   All other systems reviewed and are negative, except as noted in HPI    Past Medical and Surgical History   has a past medical history of A-fib ([address] (2022), Angiomyolipoma of right kidney, AR (allergic rhinitis), Cardiac arrhythmia (2019), DDD (degenerative disc disease), lumbar, Deep vein thrombosis (DVT) of popliteal vein of left lower extremity, unspecified chronicity ([address] (2022), GERD (gastroesophageal reflux disease), Gluten intolerance, History of left knee replacement, Hyperlipidemia (2009), Meningioma ([address] (2019), Osteopenia (2014), Pericardial effusion (2022), Pericarditis, acute (2022), Peripheral arterial disease ([address] PFO (patent foramen ovale) (), Raynaud phenomenon, SCC (squamous cell carcinoma) (), TIA (transient ischemic attack) (2019), Venous insufficiency of left leg (2019), and Vestibular migraine. She has no past medical history of Abuse, Anemia NEC, Asthma, Autoimmune disease ([address] CAD (coronary artery disease), Calculus of kidney, Chronic kidney disease, Chronic pain, Congestive heart failure, unspecified, COPD, Depression, Diabetes ([address] Headache(784.0), Hypertension, Liver disease, Psychotic disorder ([address] PUD (peptic ulcer disease), Seizures ([address] Thyroid disease, or Trauma. has a past surgical history that includes hx gi; hx  section; vascular surgery procedure unlist; hx breast reduction (); hx tonsillectomy; hx heent; hx colonoscopy (2014); hx lumbar laminectomy (2017); hx win and bso; hx orthopaedic; hx orthopaedic; hx knee arthroscopy (Left); hx knee replacement (Left, 2012); hx endoscopy (2020); hx knee replacement (Left, 2021); hx breast biopsy (Right, ); hx colonoscopy (2018); pr cystoscopy,insert ureteral stent (10/17/2022); and hx urological (10/2022). reports that she has never smoked. She has never used smokeless tobacco. She reports current alcohol use of about 1.7 standard drinks per week. She reports that she does not use drugs.   family history includes Cancer in her mother, paternal aunt, and another family member; Dementia in her father; Diabetes in her paternal grandmother; Heart Attack in her mother; Heart Disease in her father and mother; Heart Failure in her father and mother; High Cholesterol in her father and mother; Hypertension in her father and mother; Other in her sister; Rashes/Skin Problems in her mother; Stroke in her father and mother; Thyroid Disease in her mother. Physical Exam   Nursing note and vitals reviewed. Blood pressure 110/73, pulse 77, temperature 97.9 °F (36.6 °C), temperature source Oral, resp. rate 16, height 5' 3\" (1.6 m), weight 142 lb (64.4 kg), last menstrual period 01/01/2000, SpO2 97 %. Constitutional:  No distress. Eyes: Conjunctivae are normal.   Ears:  Hearing grossly intact  Cardiovascular: Normal rate. regular rhythm, no murmurs or gallops  No edema  Pulmonary/Chest: Effort normal.   CTAB  Musculoskeletal: moves all 4 extremities   Neurological: Alert and oriented to person, place, and time. Skin: No visible rash noted. Psychiatric: Normal mood and affect. Behavior is normal.     Assessment and Plan    Diagnoses and all orders for this visit:    1. Night sweats  Severe, uncontrolled. Unclear etiology. Significant autoimmune/inflammatory conditions in the past.  Recommend repeat labs. Check chest CT and ultrasound of thyroid. -     CBC WITH AUTOMATED DIFF; Future  -     C REACTIVE PROTEIN, QT; Future  -     SED RATE (ESR); Future  -     TSH 3RD GENERATION; Future  -     T4, FREE; Future  -     METABOLIC PANEL, COMPREHENSIVE; Future  -     URINALYSIS W/ RFLX MICROSCOPIC; Future  -     US THYROID/PARATHYROID/SOFT TISS; Future  -     CT CHEST W WO CONT; Future    2. DONA positive  3. Rheumatoid factor positive  Unclear if these are related to the above. She does have some tendency towards autoimmune disease. 4. Weight loss  Reports mild weight loss. Only couple pounds. NSAIDs could be contributing. Check labs and chest CT and thyroid ultrasound. 5. History of pericarditis  She has no overt signs symptoms of pericarditis. She is taking a low-dose colchicine.   Has follow-up with [facility]  - colchicine 0.6 mg tablet; Take 0.5 Tablets by mouth daily. Indications: inflammation of the covering of the heart or pericardium    6. Thyroid enlargement  -    Asymmetric thyroid gland, right greater than left based on imaging in May at [facility]. Recommend dedicated thyroid ultrasound at [address] THYROID/PARATHYROID/SOFT TISS; Future    7. Mediastinal lymphadenopathy  Unclear etiology, based on CT scan at [facility] in May. We will repeat. Given night sweats, I do have some suspicion of lymphoma or inflammatory, infectious or autoimmune etiology. -     CT CHEST W WO CONT; Future    8. Pleural effusion  Mild based on CT scan. No overt symptoms. Repeat CT at [facility].  -     CT CHEST W WO CONT; Future    9. Pulmonary nodule  -     CT CHEST W WO CONT; Future  Mild and may not even need follow-up, but given overall symptomatology, will check repeat CT        lab results and schedule of future lab studies reviewed with patient  reviewed medications and side effects in detail    Return to clinic for further evaluation if new symptoms develop        Current Outpatient Medications   Medication Sig    colchicine 0.6 mg tablet Take 0.5 Tablets by mouth daily. Indications: inflammation of the covering of the heart or pericardium    amiodarone (CORDARONE) 200 mg tablet Take 200 mg by mouth daily. cyanocobalamin 1,000 mcg tablet Take 1,000 mcg by mouth daily. pravastatin (PRAVACHOL) 20 mg tablet Take 1 Tablet by mouth daily. venlafaxine (EFFEXOR) 37.5 mg tablet Take 37.5 mg by mouth daily. diclofenac (VOLTAREN) 1 % gel Apply  to affected area two (2) times daily as needed for Pain. Left knee    OTHER,NON-FORMULARY, Apply 1 Applicator to affected area every Monday, Wednesday, Friday. Compounded HRT cream from [address]    fluticasone propionate (FLONASE) 50 mcg/actuation nasal spray 2 Sprays by Both Nostrils route daily.     cholecalciferol, vitamin D3, 100 mcg (4,000 unit) cap Take 4,000 Units by mouth daily. esomeprazole (NEXIUM) 40 mg capsule Take 1 capsule by mouth daily. loratadine (CLARITIN) 10 mg tablet Take 10 mg by mouth daily. MULTIVITAMIN (MULTIPLE VITAMIN PO) Take 1 Tab by mouth daily. No current facility-administered medications for this visit.

## 2022-10-26 LAB
ALBUMIN SERPL-MCNC: 4.2 G/DL (ref 3.5–5)
ALBUMIN/GLOB SERPL: 1.5 {RATIO} (ref 1.1–2.2)
ALP SERPL-CCNC: 49 U/L (ref 45–117)
ALT SERPL-CCNC: 36 U/L (ref 12–78)
ANION GAP SERPL CALC-SCNC: 2 MMOL/L (ref 5–15)
APPEARANCE UR: CLEAR
AST SERPL-CCNC: 19 U/L (ref 15–37)
BACTERIA URNS QL MICRO: ABNORMAL /HPF
BASOPHILS # BLD: 0.1 K/UL (ref 0–0.1)
BASOPHILS NFR BLD: 1 % (ref 0–1)
BILIRUB SERPL-MCNC: 0.2 MG/DL (ref 0.2–1)
BILIRUB UR QL: NEGATIVE
BUN SERPL-MCNC: 18 MG/DL (ref 6–20)
BUN/CREAT SERPL: 21 (ref 12–20)
CALCIUM SERPL-MCNC: 9.1 MG/DL (ref 8.5–10.1)
CHLORIDE SERPL-SCNC: 106 MMOL/L (ref 97–108)
CO2 SERPL-SCNC: 30 MMOL/L (ref 21–32)
COLOR UR: ABNORMAL
COMMENT, HOLDF: NORMAL
CREAT SERPL-MCNC: 0.84 MG/DL (ref 0.55–1.02)
CRP SERPL-MCNC: <0.29 MG/DL (ref 0–0.6)
DIFFERENTIAL METHOD BLD: ABNORMAL
EOSINOPHIL # BLD: 0 K/UL (ref 0–0.4)
EOSINOPHIL NFR BLD: 0 % (ref 0–7)
EPITH CASTS URNS QL MICRO: ABNORMAL /LPF
ERYTHROCYTE [DISTWIDTH] IN BLOOD BY AUTOMATED COUNT: 16.8 % (ref 11.5–14.5)
ERYTHROCYTE [SEDIMENTATION RATE] IN BLOOD: 8 MM/HR (ref 0–30)
GLOBULIN SER CALC-MCNC: 2.8 G/DL (ref 2–4)
GLUCOSE SERPL-MCNC: 90 MG/DL (ref 65–100)
GLUCOSE UR STRIP.AUTO-MCNC: NEGATIVE MG/DL
HCT VFR BLD AUTO: 41.1 % (ref 35–47)
HGB BLD-MCNC: 12.8 G/DL (ref 11.5–16)
HGB UR QL STRIP: ABNORMAL
HYALINE CASTS URNS QL MICRO: ABNORMAL /LPF (ref 0–5)
IMM GRANULOCYTES # BLD AUTO: 0 K/UL (ref 0–0.04)
IMM GRANULOCYTES NFR BLD AUTO: 1 % (ref 0–0.5)
KETONES UR QL STRIP.AUTO: NEGATIVE MG/DL
LEUKOCYTE ESTERASE UR QL STRIP.AUTO: NEGATIVE
LYMPHOCYTES # BLD: 1 K/UL (ref 0.8–3.5)
LYMPHOCYTES NFR BLD: 20 % (ref 12–49)
MCH RBC QN AUTO: 29.8 PG (ref 26–34)
MCHC RBC AUTO-ENTMCNC: 31.1 G/DL (ref 30–36.5)
MCV RBC AUTO: 95.6 FL (ref 80–99)
MONOCYTES # BLD: 0.7 K/UL (ref 0–1)
MONOCYTES NFR BLD: 15 % (ref 5–13)
NEUTS SEG # BLD: 3.2 K/UL (ref 1.8–8)
NEUTS SEG NFR BLD: 63 % (ref 32–75)
NITRITE UR QL STRIP.AUTO: NEGATIVE
NRBC # BLD: 0 K/UL (ref 0–0.01)
NRBC BLD-RTO: 0 PER 100 WBC
PH UR STRIP: 5.5 [PH] (ref 5–8)
PLATELET # BLD AUTO: 254 K/UL (ref 150–400)
PMV BLD AUTO: 12.4 FL (ref 8.9–12.9)
POTASSIUM SERPL-SCNC: 5 MMOL/L (ref 3.5–5.1)
PROT SERPL-MCNC: 7 G/DL (ref 6.4–8.2)
PROT UR STRIP-MCNC: NEGATIVE MG/DL
RBC # BLD AUTO: 4.3 M/UL (ref 3.8–5.2)
RBC #/AREA URNS HPF: ABNORMAL /HPF (ref 0–5)
SAMPLES BEING HELD,HOLD: NORMAL
SODIUM SERPL-SCNC: 138 MMOL/L (ref 136–145)
SP GR UR REFRACTOMETRY: 1.01 (ref 1–1.03)
T4 FREE SERPL-MCNC: 1.1 NG/DL (ref 0.8–1.5)
TSH SERPL DL<=0.05 MIU/L-ACNC: 1.02 UIU/ML (ref 0.36–3.74)
UROBILINOGEN UR QL STRIP.AUTO: 0.2 EU/DL (ref 0.2–1)
WBC # BLD AUTO: 5 K/UL (ref 3.6–11)
WBC URNS QL MICRO: ABNORMAL /HPF (ref 0–4)

## 2022-11-01 ENCOUNTER — TELEPHONE (OUTPATIENT)
Dept: INTERNAL MEDICINE CLINIC | Age: 72
End: 2022-11-01
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
## 2022-11-17 PROBLEM — R93.89 ABNORMAL IMAGING OF THYROID: Status: ACTIVE | Noted: 2022-04-01

## 2022-12-01 ENCOUNTER — NURSE TRIAGE (OUTPATIENT)
Dept: OTHER | Facility: CLINIC | Age: 72
End: 2022-12-01

## 2022-12-01 ENCOUNTER — TELEPHONE (OUTPATIENT)
Dept: INTERNAL MEDICINE CLINIC | Age: 72
End: 2022-12-01

## 2022-12-01 NOTE — TELEPHONE ENCOUNTER
Location of patient: [address] diallo from [place] at [facility] with Daqi. Subjective: Caller states \"having cold symptoms. Also feeling weak\"     Current Symptoms: has a fever, constant cough, congestion, feeling weak  Throat is so sore from coughing  Denies chest pain    Onset: 6 days ago; gradual, worsening    Associated Symptoms: NA    Pain Severity:     Temperature: 102. 3     What has been tried: Mucinex, tylenol    LMP: NA Pregnant: NA    Recommended disposition: Go to [facility]    Care advice provided, patient verbalizes understanding; denies any other questions or concerns; instructed to call back for any new or worsening symptoms. Patient/Caller agrees with recommended disposition; writer provided warm transfer to Esperanza Ferris at [facility] for appointment scheduling    Attention Provider: Thank you for allowing me to participate in the care of your patient. The patient was connected to triage in response to information provided to the [facility]. Please do not respond through this encounter as the response is not directed to a shared pool.     Reason for Disposition   Fever > 101 F (38.3 C) and over 61years of age    Protocols used: Cough-ADULT-[place]

## 2022-12-01 NOTE — TELEPHONE ENCOUNTER
Spoke with patient and she reports a fever of 102 with a cough and congestion, weakness, sore throat. Symptoms started on 11/25/22. Vaccinated and COVID and boosted x 2, no flu vaccine as yet. She has not tested for COVID. She reports her throat is really sore. Advised warm tea with honey to help sooth. Also advised she can get otc Choloraseptic  spray. Use Mucinex for congestion and tylenol for fever. Advised patient to go to Urgent care for evaluation and treatment. She states understanding and grateful for the call.

## 2022-12-01 NOTE — TELEPHONE ENCOUNTER
Pt is calling states she has a fever of 102 with a cough. Pt wants to know if she could be seen today.  Please advise

## 2022-12-19 RX ORDER — CEFIXIME 400 MG/1
400 CAPSULE ORAL DAILY
Qty: 7 CAPSULE | Refills: 0 | Status: SHIPPED | OUTPATIENT
Start: 2022-12-19 | End: 2022-12-21 | Stop reason: ALTCHOICE

## 2022-12-21 RX ORDER — CEFDINIR 300 MG/1
300 CAPSULE ORAL 2 TIMES DAILY
Qty: 14 CAPSULE | Refills: 0 | Status: SHIPPED | OUTPATIENT
Start: 2022-12-21

## 2023-01-04 ENCOUNTER — TELEPHONE (OUTPATIENT)
Dept: INTERNAL MEDICINE CLINIC | Age: 73
End: 2023-01-04

## 2023-01-04 NOTE — TELEPHONE ENCOUNTER
Spoke with patient and she reports she has had a cough since week of Thanksgiving. She reports several rounds of ABT. She reports that she still has respiratory symptoms and her cough is getting worse. She is afebrile at this time. She did test for COVID last week and it was negative. She is requesting an appt and scheduled for 1/5/22 at 11:00 PM. Grateful for the call.

## 2023-01-04 NOTE — TELEPHONE ENCOUNTER
Pt is calling wanted to know if  could see her tomorrow. Pt states she's had a cough since Thanksgiving.  Please advise

## 2023-01-05 ENCOUNTER — OFFICE VISIT (OUTPATIENT)
Dept: INTERNAL MEDICINE CLINIC | Age: 73
End: 2023-01-05
Payer: MEDICARE

## 2023-01-05 VITALS
OXYGEN SATURATION: 97 % | DIASTOLIC BLOOD PRESSURE: 83 MMHG | RESPIRATION RATE: 16 BRPM | HEIGHT: 63 IN | TEMPERATURE: 97.9 F | HEART RATE: 72 BPM | WEIGHT: 141 LBS | BODY MASS INDEX: 24.98 KG/M2 | SYSTOLIC BLOOD PRESSURE: 134 MMHG

## 2023-01-05 DIAGNOSIS — R05.8 OTHER COUGH: Primary | ICD-10-CM

## 2023-01-05 PROCEDURE — G0463 HOSPITAL OUTPT CLINIC VISIT: HCPCS | Performed by: INTERNAL MEDICINE

## 2023-01-05 RX ORDER — AMOXICILLIN AND CLAVULANATE POTASSIUM 875; 125 MG/1; MG/1
1 TABLET, FILM COATED ORAL 2 TIMES DAILY
Qty: 20 TABLET | Refills: 0 | Status: SHIPPED | OUTPATIENT
Start: 2023-01-05 | End: 2023-01-15

## 2023-01-05 RX ORDER — PREDNISONE 20 MG/1
TABLET ORAL
Qty: 18 TABLET | Refills: 0 | Status: SHIPPED | OUTPATIENT
Start: 2023-01-05 | End: 2023-01-14

## 2023-01-05 NOTE — PROGRESS NOTES
HISTORY OF PRESENT ILLNESS    Chief Complaint   Patient presents with    Cough     Persistent - COVID at home test negative - since Thanksgiving week. Presents with cough since 11/25/22. Called w sore throat, fever 102 and cough 12/1/22. Was not tested for flu or covid. Contact us contacted us on December 19 with on sinus congestion. I sent in cefixime which she took 12/19, despite it being expensive. Then took cefdinir 7 days, completed 3 days ago  She had a follow-up chest CT at [address] [place] [address] in November which showed improvement of lymph nodes in the mediastinum, tiny lung nodules. Recommended repeat in 12 months. Reports some fatigue still, variable. Today, reports forehead and facial pressure, postnasal drip, cough ongoing. Took saline, flonase, nasonext, mucinex  In 4/2021, was given doxy 28 days, prednisone  Sees ENT Dr. Nickolas Jasso at [address] [place] [address] for burn in throat. Also dx vestibular migraines.   MRI included paranasal sinuses and mastoid air cells are clear  Sinus CT 4/30/21 normal included paranasal sinuses and mastoid air cells are clear    Review of Systems   All other systems reviewed and are negative, except as noted in HPI    Past Medical and Surgical History   has a past medical history of A-fib ([address] (2022), Abnormal imaging of thyroid (04/2022), Angiomyolipoma of right kidney, AR (allergic rhinitis), Cardiac arrhythmia (2019), DDD (degenerative disc disease), lumbar, Deep vein thrombosis (DVT) of popliteal vein of left lower extremity, unspecified chronicity ([address] (05/17/2022), GERD (gastroesophageal reflux disease), Gluten intolerance, History of left knee replacement, Hyperlipidemia (01/08/2009), Interstitial cystitis (10/2022), Meningioma ([address] (07/01/2019), Osteopenia (04/2014), Pericardial effusion (05/2022), Pericarditis, acute (05/2022), Peripheral arterial disease ([address] PFO (patent foramen ovale) (2019), Raynaud phenomenon, SCC (squamous cell carcinoma) (2014), TIA (transient ischemic attack) (2019), Venous insufficiency of left leg (2019), and Vestibular migraine. She has no past medical history of Abuse, Anemia NEC, Asthma, Autoimmune disease ([address] CAD (coronary artery disease), Calculus of kidney, Chronic kidney disease, Chronic pain, Congestive heart failure, unspecified, COPD, Depression, Diabetes ([address] Headache(784.0), Hypertension, Liver disease, Psychotic disorder ([address] PUD (peptic ulcer disease), Seizures ([address] Thyroid disease, or Trauma. has a past surgical history that includes hx gi; hx  section; pr unlisted procedure vascular surgery; hx breast reduction (); hx tonsillectomy; hx heent; hx colonoscopy (2014); hx lumbar laminectomy (2017); hx win and bso; hx orthopaedic; hx orthopaedic; hx knee arthroscopy (Left); hx knee replacement (Left, 2012); hx endoscopy (2020); hx knee replacement (Left, 2021); hx breast biopsy (Right, ); hx colonoscopy (2018); pr cysto w/insert ureteral stent (10/17/2022); and hx urological (10/2022). reports that she has never smoked. She has never used smokeless tobacco. She reports current alcohol use of about 1.7 standard drinks per week. She reports that she does not use drugs. family history includes Cancer in her mother, paternal aunt, and another family member; Dementia in her father; Diabetes in her paternal grandmother; Heart Attack in her mother; Heart Disease in her father and mother; Heart Failure in her father and mother; High Cholesterol in her father and mother; Hypertension in her father and mother; Other in her sister; Rashes/Skin Problems in her mother; Stroke in her father and mother; Thyroid Disease in her mother. Physical Exam   Nursing note and vitals reviewed. Blood pressure 134/83, pulse 72, temperature 97.9 °F (36.6 °C), temperature source Oral, resp.  rate 16, height 5' 3\" (1.6 m), weight 141 lb (64 kg), last menstrual period 2000, SpO2 97 %.  Constitutional: In no distress. Eyes: Conjunctivae are normal.  HEENT:  No LAD or thyromegaly   Cardiovascular: Normal rate. regular rhythm. No murmurs  No edema  Pulmonary/Chest: Effort normal. clear to ausculation blaterally  Musculoskeletal:  no edema. Abd:  Neurological: Alert and oriented. Grossly intact cranial nerves and motor function. Skin: No visible rash noted. Psychiatric: Normal mood and affect. Behavior is normal.     Diagnoses and all orders for this visit:    1. Other cough  Persistent for over 1 month. Likely postviral cough cannot rule out bacterial component. Trial of Augmentin and prednisone for inflammation. This may not completely resolve quickly or easily. Reassured. Monitor for progressive illness. -     predniSONE (DELTASONE) 20 mg tablet; Take 3 pills for 3 days, then 2 pills for 3 days, then 1 pill for 3 days  -     amoxicillin-clavulanate (Augmentin) 875-125 mg per tablet; Take 1 Tablet by mouth two (2) times a day for 10 days. lab results and schedule of future lab studies reviewed with patient  reviewed medications and side effects in detail    Return to clinic for further evaluation if new symptoms develop or if current symptoms worsen or fail to resolve.

## 2023-02-27 DIAGNOSIS — R92.2 DENSE BREAST TISSUE ON MAMMOGRAM: Primary | ICD-10-CM

## 2023-04-21 ENCOUNTER — HOSPITAL ENCOUNTER (OUTPATIENT)
Dept: MRI IMAGING | Age: 73
Discharge: HOME | End: 2023-04-21
Attending: INTERNAL MEDICINE
Payer: MEDICARE

## 2023-04-21 DIAGNOSIS — R92.2 DENSE BREAST TISSUE ON MAMMOGRAM: ICD-10-CM

## 2023-04-21 PROCEDURE — 74011250636 HC RX REV CODE- 250/636: Performed by: INTERNAL MEDICINE

## 2023-04-21 PROCEDURE — 77049 MRI BREAST C-+ W/CAD BI: CPT

## 2023-04-21 PROCEDURE — A9585 GADOBUTROL INJECTION: HCPCS | Performed by: INTERNAL MEDICINE

## 2023-04-21 RX ADMIN — GADOBUTROL 6 ML: 604.72 INJECTION INTRAVENOUS at 11:20

## 2023-07-20 DIAGNOSIS — M62.838 MUSCLE SPASM: Primary | ICD-10-CM

## 2023-07-20 RX ORDER — CYCLOBENZAPRINE HCL 5 MG
5 TABLET ORAL 3 TIMES DAILY PRN
Qty: 90 TABLET | Refills: 0 | Status: SHIPPED | OUTPATIENT
Start: 2023-07-20 | End: 2023-08-19

## 2023-07-20 NOTE — TELEPHONE ENCOUNTER
Can you schedule? Spoke with patient and she reports she is having low back pain and muscle spasms from twisting. She reports she is seeing her chiropractor but the flexeril helps greatly in recovering. She is requesting a new order. Dr. Nahun Newton notified .

## 2023-08-02 ENCOUNTER — TELEPHONE (OUTPATIENT)
Age: 73
End: 2023-08-02

## 2023-08-02 NOTE — TELEPHONE ENCOUNTER
Spoke with patient and she reports that she was diagnosed with Thyroid Cancer and is scheduled to have a Thyroidectomy on 9/13/23. She declined to schedule a pre op appt at this time and will call back. She asks that Dr. Chelsea Mcgill be notified. Grateful for the call. Dr. Chelsea Mcgill notified.

## 2023-10-11 PROBLEM — C73 THYROID CANCER (HCC): Status: ACTIVE | Noted: 2023-09-01

## 2023-10-18 ENCOUNTER — HOSPITAL ENCOUNTER (OUTPATIENT)
Facility: HOSPITAL | Age: 73
Discharge: HOME | End: 2023-10-21
Attending: ORTHOPAEDIC SURGERY
Payer: MEDICARE

## 2023-10-18 DIAGNOSIS — M77.02 MEDIAL EPICONDYLITIS, LEFT: ICD-10-CM

## 2023-10-18 DIAGNOSIS — M70.32 CUBITAL BURSITIS OF LEFT ELBOW: ICD-10-CM

## 2023-10-18 PROCEDURE — 73221 MRI JOINT UPR EXTREM W/O DYE: CPT

## 2023-11-08 ENCOUNTER — HOSPITAL ENCOUNTER (OUTPATIENT)
Facility: HOSPITAL | Age: 73
Discharge: HOME | End: 2023-11-11
Payer: MEDICARE

## 2023-11-08 VITALS
HEIGHT: 62 IN | HEART RATE: 85 BPM | DIASTOLIC BLOOD PRESSURE: 62 MMHG | WEIGHT: 150.9 LBS | TEMPERATURE: 97.3 F | RESPIRATION RATE: 18 BRPM | BODY MASS INDEX: 27.77 KG/M2 | OXYGEN SATURATION: 95 % | SYSTOLIC BLOOD PRESSURE: 131 MMHG

## 2023-11-08 PROBLEM — M77.12 LATERAL EPICONDYLITIS OF LEFT ELBOW: Status: ACTIVE | Noted: 2023-11-08

## 2023-11-08 LAB
ANION GAP SERPL CALC-SCNC: 5 MMOL/L (ref 5–15)
BASOPHILS # BLD: 0 K/UL (ref 0–0.1)
BASOPHILS NFR BLD: 1 % (ref 0–1)
BUN SERPL-MCNC: 15 MG/DL (ref 6–20)
BUN/CREAT SERPL: 19 (ref 12–20)
CALCIUM SERPL-MCNC: 8.5 MG/DL (ref 8.5–10.1)
CHLORIDE SERPL-SCNC: 106 MMOL/L (ref 97–108)
CO2 SERPL-SCNC: 29 MMOL/L (ref 21–32)
CREAT SERPL-MCNC: 0.81 MG/DL (ref 0.55–1.02)
DIFFERENTIAL METHOD BLD: ABNORMAL
EOSINOPHIL # BLD: 0 K/UL (ref 0–0.4)
EOSINOPHIL NFR BLD: 0 % (ref 0–7)
ERYTHROCYTE [DISTWIDTH] IN BLOOD BY AUTOMATED COUNT: 15.1 % (ref 11.5–14.5)
GLUCOSE SERPL-MCNC: 68 MG/DL (ref 65–100)
HCT VFR BLD AUTO: 39.5 % (ref 35–47)
HGB BLD-MCNC: 12.2 G/DL (ref 11.5–16)
IMM GRANULOCYTES # BLD AUTO: 0 K/UL (ref 0–0.04)
IMM GRANULOCYTES NFR BLD AUTO: 0 % (ref 0–0.5)
LYMPHOCYTES # BLD: 1.1 K/UL (ref 0.8–3.5)
LYMPHOCYTES NFR BLD: 20 % (ref 12–49)
MCH RBC QN AUTO: 30.7 PG (ref 26–34)
MCHC RBC AUTO-ENTMCNC: 30.9 G/DL (ref 30–36.5)
MCV RBC AUTO: 99.5 FL (ref 80–99)
MONOCYTES # BLD: 0.8 K/UL (ref 0–1)
MONOCYTES NFR BLD: 15 % (ref 5–13)
NEUTS SEG # BLD: 3.6 K/UL (ref 1.8–8)
NEUTS SEG NFR BLD: 64 % (ref 32–75)
NRBC # BLD: 0 K/UL (ref 0–0.01)
NRBC BLD-RTO: 0 PER 100 WBC
PLATELET # BLD AUTO: 245 K/UL (ref 150–400)
PMV BLD AUTO: 11.7 FL (ref 8.9–12.9)
POTASSIUM SERPL-SCNC: 4 MMOL/L (ref 3.5–5.1)
RBC # BLD AUTO: 3.97 M/UL (ref 3.8–5.2)
SODIUM SERPL-SCNC: 140 MMOL/L (ref 136–145)
WBC # BLD AUTO: 5.5 K/UL (ref 3.6–11)

## 2023-11-08 PROCEDURE — 80048 BASIC METABOLIC PNL TOTAL CA: CPT

## 2023-11-08 PROCEDURE — 71046 X-RAY EXAM CHEST 2 VIEWS: CPT

## 2023-11-08 PROCEDURE — 36415 COLL VENOUS BLD VENIPUNCTURE: CPT

## 2023-11-08 PROCEDURE — 85025 COMPLETE CBC W/AUTO DIFF WBC: CPT

## 2023-11-08 PROCEDURE — 93005 ELECTROCARDIOGRAM TRACING: CPT | Performed by: NURSE PRACTITIONER

## 2023-11-08 RX ORDER — ALBUTEROL SULFATE 2.5 MG/3ML
2.5 SOLUTION RESPIRATORY (INHALATION) 2 TIMES DAILY
COMMUNITY

## 2023-11-08 RX ORDER — AMIODARONE HYDROCHLORIDE 100 MG/1
100 TABLET ORAL DAILY
COMMUNITY

## 2023-11-08 RX ORDER — AZITHROMYCIN 500 MG/1
500 TABLET, FILM COATED ORAL
COMMUNITY

## 2023-11-08 RX ORDER — LEVOTHYROXINE SODIUM 0.1 MG/1
100 TABLET ORAL EVERY MORNING
COMMUNITY

## 2023-11-08 RX ORDER — ETHAMBUTOL HYDROCHLORIDE 400 MG/1
400 TABLET, FILM COATED ORAL
COMMUNITY

## 2023-11-08 RX ORDER — RIMEGEPANT SULFATE 75 MG/75MG
TABLET, ORALLY DISINTEGRATING ORAL AS NEEDED
COMMUNITY

## 2023-11-08 ASSESSMENT — ENCOUNTER SYMPTOMS
SORE THROAT: 0
TROUBLE SWALLOWING: 0
SHORTNESS OF BREATH: 0
NAUSEA: 0
BLOOD IN STOOL: 0
ABDOMINAL PAIN: 0
VOMITING: 0
COUGH: 1
WHEEZING: 0

## 2023-11-08 NOTE — H&P
Pulses: Normal pulses. Heart sounds: Normal heart sounds. Pulmonary:      Effort: Pulmonary effort is normal.      Breath sounds: Normal breath sounds. Abdominal:      General: Bowel sounds are normal.      Palpations: Abdomen is soft. Tenderness: There is no abdominal tenderness. Musculoskeletal:      Cervical back: Normal range of motion and neck supple. Comments: Tenderness to left lateral epicondyle and left medial epicondyle   Skin:     General: Skin is warm and dry. Findings: No rash. Neurological:      General: No focal deficit present. Mental Status: She is alert and oriented to person, place, and time. Psychiatric:         Mood and Affect: Mood normal.         Behavior: Behavior normal.        Assessment  Cubital bursitis of left elbow  Medial epicondylitis left elbow  Preoperative evaluation  Mycobacterium, non-TB lung infection    Plan  Labs and EKG pending  Plan for Left Lateral Epicondylar Release with Repair and Left Medial Epicondylar Cortisone Injection  Patient has recently seen Cardiology and Pulmonary; will notify them of upcoming surgery. Medication plan for Eliquis sent to Dr Naomie Lazo at [facility]. The purpose of this visit is for preoperative history and physical and does not imply medical clearance for surgical procedure. Additional clearance from specialists may be required based on findings from this examination. According to the 2014 ACC/AHA pre-operative risk assessment guidelines [place] Red is at low risk for major cardiac complications during a intermediate procedure, exercise tolerance is >4 METS.     Request further recommendations from consultants: Cardiology and Pulmonology

## 2023-11-08 NOTE — PERIOP NOTE
Per Regis Lopez, NP cardiac and pulmonary clearance requested. Spoke with Gordon Parr at Dr. Shyanne Pan and informed them.

## 2023-11-09 LAB
EKG ATRIAL RATE: 66 BPM
EKG DIAGNOSIS: NORMAL
EKG P AXIS: 67 DEGREES
EKG P-R INTERVAL: 154 MS
EKG Q-T INTERVAL: 438 MS
EKG QRS DURATION: 86 MS
EKG QTC CALCULATION (BAZETT): 459 MS
EKG R AXIS: 49 DEGREES
EKG T AXIS: 51 DEGREES
EKG VENTRICULAR RATE: 66 BPM

## 2023-11-09 PROCEDURE — 93010 ELECTROCARDIOGRAM REPORT: CPT | Performed by: STUDENT IN AN ORGANIZED HEALTH CARE EDUCATION/TRAINING PROGRAM

## 2023-11-10 NOTE — PERIOP NOTE
Call to Dr. Vijay Mann office, eliquis plan has been received and will be returned to PAT once complete. Left a message for Marylin Harmon at Dr. Jasmyn Fontaine office to notify that (1) the patient has not yet received eliquis instructions and (2) to follow up on cardiac and pulmonary clearances. Eliquis instructions received, call to patient and instructions reviewed. Left a message for Marylin Harmon at Dr. Jasmyn Fontaine office to update.

## 2023-11-14 RX ORDER — ONDANSETRON 2 MG/ML
4 INJECTION INTRAMUSCULAR; INTRAVENOUS
Status: CANCELLED | OUTPATIENT
Start: 2023-11-14 | End: 2023-11-15

## 2023-11-14 RX ORDER — DIPHENHYDRAMINE HYDROCHLORIDE 50 MG/ML
12.5 INJECTION INTRAMUSCULAR; INTRAVENOUS
Status: CANCELLED | OUTPATIENT
Start: 2023-11-14 | End: 2023-11-15

## 2023-11-14 RX ORDER — SODIUM CHLORIDE, SODIUM LACTATE, POTASSIUM CHLORIDE, CALCIUM CHLORIDE 600; 310; 30; 20 MG/100ML; MG/100ML; MG/100ML; MG/100ML
INJECTION, SOLUTION INTRAVENOUS CONTINUOUS
Status: CANCELLED | OUTPATIENT
Start: 2023-11-14

## 2023-11-14 NOTE — PERIOP NOTE
11/14/23 @ [id number]: Received call from Matteson at Dr Gisselle Johnson requesting to have anesthesia review case today r/t recent dx with micobacterium infection. Chart delivered to Dr Sheyla Grayson for review. 11/14 @ [id number]: Received call from Dr Sheyla Grayson. He discussed case with Dr Jesse Gonzalez, Pulmonology; okay to proceed. 11/14 @ 1050:  S/W Matteson at Dr Gisselle Johnson to notify that anesthesia is okay to proceed with case, cardiac clearance received from Dr Tiffanie Harry.

## 2023-11-15 ENCOUNTER — ANESTHESIA (OUTPATIENT)
Facility: HOSPITAL | Age: 73
End: 2023-11-15
Payer: MEDICARE

## 2023-11-15 ENCOUNTER — HOSPITAL ENCOUNTER (OUTPATIENT)
Facility: HOSPITAL | Age: 73
Setting detail: OUTPATIENT SURGERY
Discharge: HOME | End: 2023-11-15
Attending: ORTHOPAEDIC SURGERY | Admitting: ORTHOPAEDIC SURGERY
Payer: MEDICARE

## 2023-11-15 ENCOUNTER — ANESTHESIA EVENT (OUTPATIENT)
Facility: HOSPITAL | Age: 73
End: 2023-11-15
Payer: MEDICARE

## 2023-11-15 VITALS
SYSTOLIC BLOOD PRESSURE: 123 MMHG | HEART RATE: 67 BPM | OXYGEN SATURATION: 93 % | RESPIRATION RATE: 13 BRPM | WEIGHT: 151.01 LBS | HEIGHT: 62 IN | BODY MASS INDEX: 27.79 KG/M2 | TEMPERATURE: 98 F | DIASTOLIC BLOOD PRESSURE: 71 MMHG

## 2023-11-15 PROCEDURE — 2500000003 HC RX 250 WO HCPCS: Performed by: NURSE ANESTHETIST, CERTIFIED REGISTERED

## 2023-11-15 PROCEDURE — 2580000003 HC RX 258: Performed by: ORTHOPAEDIC SURGERY

## 2023-11-15 PROCEDURE — 6360000002 HC RX W HCPCS: Performed by: ANESTHESIOLOGY

## 2023-11-15 PROCEDURE — 6360000002 HC RX W HCPCS: Performed by: ORTHOPAEDIC SURGERY

## 2023-11-15 PROCEDURE — 2580000003 HC RX 258: Performed by: ANESTHESIOLOGY

## 2023-11-15 PROCEDURE — 3600000012 HC SURGERY LEVEL 2 ADDTL 15MIN: Performed by: ORTHOPAEDIC SURGERY

## 2023-11-15 PROCEDURE — 3700000000 HC ANESTHESIA ATTENDED CARE: Performed by: ORTHOPAEDIC SURGERY

## 2023-11-15 PROCEDURE — 6360000002 HC RX W HCPCS: Performed by: NURSE ANESTHETIST, CERTIFIED REGISTERED

## 2023-11-15 PROCEDURE — 2500000003 HC RX 250 WO HCPCS: Performed by: ORTHOPAEDIC SURGERY

## 2023-11-15 PROCEDURE — 64415 NJX AA&/STRD BRCH PLXS IMG: CPT | Performed by: ANESTHESIOLOGY

## 2023-11-15 PROCEDURE — C1713 ANCHOR/SCREW BN/BN,TIS/BN: HCPCS | Performed by: ORTHOPAEDIC SURGERY

## 2023-11-15 PROCEDURE — 3600000002 HC SURGERY LEVEL 2 BASE: Performed by: ORTHOPAEDIC SURGERY

## 2023-11-15 PROCEDURE — 7100000000 HC PACU RECOVERY - FIRST 15 MIN: Performed by: ORTHOPAEDIC SURGERY

## 2023-11-15 PROCEDURE — 2709999900 HC NON-CHARGEABLE SUPPLY: Performed by: ORTHOPAEDIC SURGERY

## 2023-11-15 PROCEDURE — 7100000010 HC PHASE II RECOVERY - FIRST 15 MIN: Performed by: ORTHOPAEDIC SURGERY

## 2023-11-15 PROCEDURE — 3700000001 HC ADD 15 MINUTES (ANESTHESIA): Performed by: ORTHOPAEDIC SURGERY

## 2023-11-15 PROCEDURE — 7100000011 HC PHASE II RECOVERY - ADDTL 15 MIN: Performed by: ORTHOPAEDIC SURGERY

## 2023-11-15 PROCEDURE — 7100000001 HC PACU RECOVERY - ADDTL 15 MIN: Performed by: ORTHOPAEDIC SURGERY

## 2023-11-15 DEVICE — IMPLANTABLE DEVICE
Type: IMPLANTABLE DEVICE | Site: ELBOW | Status: FUNCTIONAL
Brand: JUGGERKNOT SOFT ANCHORS

## 2023-11-15 RX ORDER — LIDOCAINE HYDROCHLORIDE 10 MG/ML
INJECTION, SOLUTION INFILTRATION; PERINEURAL PRN
Status: DISCONTINUED | OUTPATIENT
Start: 2023-11-15 | End: 2023-11-15 | Stop reason: HOSPADM

## 2023-11-15 RX ORDER — MIDAZOLAM HYDROCHLORIDE 2 MG/2ML
2 INJECTION, SOLUTION INTRAMUSCULAR; INTRAVENOUS
Status: DISCONTINUED | OUTPATIENT
Start: 2023-11-15 | End: 2023-11-15 | Stop reason: HOSPADM

## 2023-11-15 RX ORDER — SODIUM CHLORIDE, SODIUM LACTATE, POTASSIUM CHLORIDE, CALCIUM CHLORIDE 600; 310; 30; 20 MG/100ML; MG/100ML; MG/100ML; MG/100ML
INJECTION, SOLUTION INTRAVENOUS CONTINUOUS
Status: DISCONTINUED | OUTPATIENT
Start: 2023-11-15 | End: 2023-11-15 | Stop reason: HOSPADM

## 2023-11-15 RX ORDER — ACETAMINOPHEN 500 MG
500 TABLET ORAL EVERY 6 HOURS PRN
COMMUNITY

## 2023-11-15 RX ORDER — ONDANSETRON 2 MG/ML
INJECTION INTRAMUSCULAR; INTRAVENOUS PRN
Status: DISCONTINUED | OUTPATIENT
Start: 2023-11-15 | End: 2023-11-15 | Stop reason: SDUPTHER

## 2023-11-15 RX ORDER — MIDAZOLAM HYDROCHLORIDE 1 MG/ML
INJECTION INTRAMUSCULAR; INTRAVENOUS PRN
Status: DISCONTINUED | OUTPATIENT
Start: 2023-11-15 | End: 2023-11-15 | Stop reason: SDUPTHER

## 2023-11-15 RX ORDER — LIDOCAINE HYDROCHLORIDE 20 MG/ML
INJECTION, SOLUTION EPIDURAL; INFILTRATION; INTRACAUDAL; PERINEURAL PRN
Status: DISCONTINUED | OUTPATIENT
Start: 2023-11-15 | End: 2023-11-15 | Stop reason: SDUPTHER

## 2023-11-15 RX ORDER — ROPIVACAINE HYDROCHLORIDE 5 MG/ML
INJECTION, SOLUTION EPIDURAL; INFILTRATION; PERINEURAL PRN
Status: DISCONTINUED | OUTPATIENT
Start: 2023-11-15 | End: 2023-11-15 | Stop reason: SDUPTHER

## 2023-11-15 RX ORDER — BETAMETHASONE SODIUM PHOSPHATE AND BETAMETHASONE ACETATE 3; 3 MG/ML; MG/ML
INJECTION, SUSPENSION INTRA-ARTICULAR; INTRALESIONAL; INTRAMUSCULAR; SOFT TISSUE PRN
Status: DISCONTINUED | OUTPATIENT
Start: 2023-11-15 | End: 2023-11-15 | Stop reason: HOSPADM

## 2023-11-15 RX ORDER — FENTANYL CITRATE 50 UG/ML
INJECTION, SOLUTION INTRAMUSCULAR; INTRAVENOUS PRN
Status: DISCONTINUED | OUTPATIENT
Start: 2023-11-15 | End: 2023-11-15 | Stop reason: SDUPTHER

## 2023-11-15 RX ORDER — LIDOCAINE HYDROCHLORIDE 10 MG/ML
1 INJECTION, SOLUTION EPIDURAL; INFILTRATION; INTRACAUDAL; PERINEURAL
Status: DISCONTINUED | OUTPATIENT
Start: 2023-11-15 | End: 2023-11-15 | Stop reason: HOSPADM

## 2023-11-15 RX ORDER — PROPOFOL 10 MG/ML
INJECTION, EMULSION INTRAVENOUS CONTINUOUS PRN
Status: DISCONTINUED | OUTPATIENT
Start: 2023-11-15 | End: 2023-11-15 | Stop reason: SDUPTHER

## 2023-11-15 RX ORDER — FENTANYL CITRATE 50 UG/ML
100 INJECTION, SOLUTION INTRAMUSCULAR; INTRAVENOUS
Status: DISCONTINUED | OUTPATIENT
Start: 2023-11-15 | End: 2023-11-15 | Stop reason: HOSPADM

## 2023-11-15 RX ORDER — DEXAMETHASONE SODIUM PHOSPHATE 4 MG/ML
INJECTION, SOLUTION INTRA-ARTICULAR; INTRALESIONAL; INTRAMUSCULAR; INTRAVENOUS; SOFT TISSUE PRN
Status: DISCONTINUED | OUTPATIENT
Start: 2023-11-15 | End: 2023-11-15 | Stop reason: SDUPTHER

## 2023-11-15 RX ADMIN — LIDOCAINE HYDROCHLORIDE 30 MG: 20 INJECTION, SOLUTION EPIDURAL; INFILTRATION; INTRACAUDAL; PERINEURAL at 08:40

## 2023-11-15 RX ADMIN — MIDAZOLAM HYDROCHLORIDE 2 MG: 1 INJECTION, SOLUTION INTRAMUSCULAR; INTRAVENOUS at 07:49

## 2023-11-15 RX ADMIN — ROPIVACAINE HYDROCHLORIDE 30 ML: 5 INJECTION, SOLUTION EPIDURAL; INFILTRATION; PERINEURAL at 07:59

## 2023-11-15 RX ADMIN — DEXAMETHASONE SODIUM PHOSPHATE 4 MG: 4 INJECTION, SOLUTION INTRAMUSCULAR; INTRAVENOUS at 08:44

## 2023-11-15 RX ADMIN — FENTANYL CITRATE 100 MCG: 50 INJECTION, SOLUTION INTRAMUSCULAR; INTRAVENOUS at 07:49

## 2023-11-15 RX ADMIN — WATER 2000 MG: 1 INJECTION INTRAMUSCULAR; INTRAVENOUS; SUBCUTANEOUS at 08:44

## 2023-11-15 RX ADMIN — ONDANSETRON HYDROCHLORIDE 4 MG: 2 SOLUTION INTRAMUSCULAR; INTRAVENOUS at 08:44

## 2023-11-15 RX ADMIN — PROPOFOL 100 MCG/KG/MIN: 10 INJECTION, EMULSION INTRAVENOUS at 08:38

## 2023-11-15 RX ADMIN — SODIUM CHLORIDE, POTASSIUM CHLORIDE, SODIUM LACTATE AND CALCIUM CHLORIDE: 600; 310; 30; 20 INJECTION, SOLUTION INTRAVENOUS at 07:07

## 2023-11-15 ASSESSMENT — PAIN - FUNCTIONAL ASSESSMENT
PAIN_FUNCTIONAL_ASSESSMENT: 0-10
PAIN_FUNCTIONAL_ASSESSMENT: ACTIVITIES ARE NOT PREVENTED

## 2023-11-15 ASSESSMENT — PAIN SCALES - GENERAL
PAINLEVEL_OUTOF10: 0
PAINLEVEL_OUTOF10: 0

## 2023-11-15 ASSESSMENT — PAIN DESCRIPTION - DESCRIPTORS: DESCRIPTORS: ACHING

## 2023-11-15 NOTE — ANESTHESIA PROCEDURE NOTES
Peripheral Block    Patient location during procedure: pre-op  Reason for block: procedure for pain, post-op pain management, primary anesthetic and at surgeon's request  Start time: 11/15/2023 7:49 AM  End time: 11/15/2023 7:59 AM  Staffing  Performed: anesthesiologist   Anesthesiologist: Annabelle Brandon MD  Performed by: Annabelle Brandon MD  Authorized by: Annabelle Brandon MD    Preanesthetic Checklist  Completed: patient identified, IV checked, site marked, risks and benefits discussed, surgical/procedural consents, pre-op evaluation, timeout performed, anesthesia consent given, oxygen available and monitors applied/VS acknowledged  Peripheral Block   Patient position: supine  Prep: ChloraPrep  Provider prep: mask and sterile gloves  Patient monitoring: cardiac monitor, continuous pulse ox, continuous capnometry, frequent blood pressure checks, IV access, oxygen and responsive to questions  Block type: Brachial plexus  Supraclavicular  Laterality: left  Injection technique: single-shot  Guidance: ultrasound guided    Needle   Needle type: Other   Needle gauge: 25 G  Needle localization: ultrasound guidance  Needle length: 5 cmOther needle type: STIMUPLEX  Assessment   Injection assessment: negative aspiration for heme, no paresthesia on injection, local visualized surrounding nerve on ultrasound and no intravascular symptoms  Hemodynamics: stable  Outcomes: patient tolerated procedure well    Additional Notes  Radhika FERRER witnessed timeout and block written on correct side.

## 2023-11-15 NOTE — ANESTHESIA POSTPROCEDURE EVALUATION
Department of Anesthesiology  Postprocedure Note    Patient: Peewee Landin  MRN: [id number]  YOB: 1950  Date of evaluation: 11/15/2023      Procedure Summary     Date: 11/15/23 Room / Location: [facility] ASU OR  / [facility] AMBULATORY OR    Anesthesia Start: 4411 Anesthesia Stop: 1008    Procedure: LEFT LATERAL EPICONDYLAR RELEASE WITH REPAIR AND LEFT MEDIAL EPICONDYLAR CORTISONE INJECTION (MAC WITH REGIONAL BLOCK) (Left: Elbow) Diagnosis:       Lateral epicondylitis of left elbow      Medial epicondylitis of left elbow      (Lateral epicondylitis of left elbow [M77.12])      (Medial epicondylitis of left elbow [M77.02])    Surgeons: Anam Chino MD Responsible Provider: Zakia Beverly MD    Anesthesia Type: Regional, MAC ASA Status: 3          Anesthesia Type: Regional, MAC    Catherine Phase I: Catherine Score: 9    Catherine Phase II: Catherine Score: 9      Anesthesia Post Evaluation    Patient location during evaluation: PACU  Patient participation: complete - patient participated  Level of consciousness: awake  Pain score: 0  Airway patency: patent  Nausea & Vomiting: no nausea and no vomiting  Complications: no  Cardiovascular status: blood pressure returned to baseline  Respiratory status: acceptable  Hydration status: euvolemic  Multimodal analgesia pain management approach  Pain management: adequate

## 2023-11-15 NOTE — ANESTHESIA PRE PROCEDURE
clear to auscultation                             Cardiovascular:  Exercise tolerance: good (>4 METS),   (+) dysrhythmias:, hyperlipidemia      NYHA Classification: II  ECG reviewed  Rhythm: regular  Rate: normal  Echocardiogram reviewed         Beta Blocker:  Not on Beta Blocker      ROS comment: pfo     Neuro/Psych:   (+) TIA, headaches: migraine headaches,             GI/Hepatic/Renal:   (+) GERD: well controlled,           Endo/Other:    (+) hypothyroidism::., malignancy/cancer. Pt had no PAT visit       Abdominal:             Vascular:   + DVT, . Other Findings:           Anesthesia Plan      MAC and regional     ASA 3       Induction: intravenous. MIPS: Postoperative opioids intended and Prophylactic antiemetics administered. Anesthetic plan and risks discussed with patient. Plan discussed with CRNA.           Post-op pain plan if not by surgeon: single peripheral nerve block            Theresa Smith MD   11/15/2023

## 2023-11-15 NOTE — BRIEF OP NOTE
Brief Postoperative Note      Patient: Bossman Red  YOB: 1950  MRN: [id number]    Date of Procedure: 11/15/2023    Pre-Op Diagnosis Codes:     * Lateral epicondylitis of left elbow [M77.12]     * Medial epicondylitis of left elbow [M77.02]    Post-Op Diagnosis: Same       Procedure(s):  LEFT LATERAL EPICONDYLAR RELEASE WITH REPAIR AND LEFT MEDIAL EPICONDYLAR CORTISONE INJECTION (MAC WITH REGIONAL BLOCK)    Surgeon(s):  Apryl Jose MD    Assistant:  Physician Assistant: CHARLINE Chapman    Anesthesia: Monitor Anesthesia Care    Estimated Blood Loss (mL): Minimal    Complications: None    Specimens:   * No specimens in log *    Implants:  Implant Name Type Inv.  Item Serial No.  Lot No. LRB No. Used Action   ANCHOR SUT 1MM W/ 3-0 SUT AND NDL AND DRL MINI SFT - SNA  ANCHOR SUT 1MM W/ 3-0 SUT AND NDL AND DRL MINI SFT NA [employer]- [id number] Left 1 Implanted         Drains: * No LDAs found *    Findings: left common extensor origin tear       Electronically signed by CHARLINE Chapman on 11/15/2023 at 11:43 AM

## 2023-12-07 RX ORDER — PRAVASTATIN SODIUM 40 MG
TABLET ORAL
Qty: 90 TABLET | Refills: 3 | Status: SHIPPED | OUTPATIENT
Start: 2023-12-07

## 2024-01-24 NOTE — TELEPHONE ENCOUNTER
Received medication refill from pharmacy     Medication: Bal Est (0.125 mg/ml) TC 0.0125% cream     LOV: 01/05/23    Medication refill routed to provider

## 2024-02-15 ENCOUNTER — PATIENT MESSAGE (OUTPATIENT)
Age: 74
End: 2024-02-15

## 2024-02-15 DIAGNOSIS — M62.838 MUSCLE SPASM: Primary | ICD-10-CM

## 2024-02-16 RX ORDER — CYCLOBENZAPRINE HCL 5 MG
5 TABLET ORAL 3 TIMES DAILY PRN
Qty: 90 TABLET | Refills: 0 | Status: SHIPPED | OUTPATIENT
Start: 2024-02-16 | End: 2024-03-17

## 2024-02-16 NOTE — TELEPHONE ENCOUNTER
From: Bella Ang  To: Dr. Narendra Gentile  Sent: 2/15/2024 10:28 PM EST  Subject: Need Refill Cyclobenzaprine 5 MG    Hi Dr. Gentile,  Could you please send me in a refill on my Cyclobenzaprine. I am having muscle spasms in my neck and shoulder.     Thank you very much~Melita

## 2024-06-26 ENCOUNTER — OFFICE VISIT (OUTPATIENT)
Age: 74
End: 2024-06-26
Payer: MEDICARE

## 2024-06-26 VITALS
TEMPERATURE: 98 F | HEART RATE: 76 BPM | DIASTOLIC BLOOD PRESSURE: 66 MMHG | WEIGHT: 152.8 LBS | SYSTOLIC BLOOD PRESSURE: 109 MMHG | BODY MASS INDEX: 28.12 KG/M2 | HEIGHT: 62 IN | RESPIRATION RATE: 19 BRPM | OXYGEN SATURATION: 96 %

## 2024-06-26 DIAGNOSIS — N28.89 RENAL MASS, RIGHT: ICD-10-CM

## 2024-06-26 DIAGNOSIS — M85.80 OSTEOPENIA, UNSPECIFIED LOCATION: ICD-10-CM

## 2024-06-26 DIAGNOSIS — C73 PAPILLARY THYROID CARCINOMA (HCC): ICD-10-CM

## 2024-06-26 DIAGNOSIS — A31.9 MYCOBACTERIAL INFECTION, NON-TB: ICD-10-CM

## 2024-06-26 DIAGNOSIS — Z95.0 PACEMAKER: ICD-10-CM

## 2024-06-26 DIAGNOSIS — D32.9 BENIGN NEOPLASM OF MENINGES, UNSPECIFIED (HCC): ICD-10-CM

## 2024-06-26 DIAGNOSIS — I48.0 PAROXYSMAL ATRIAL FIBRILLATION (HCC): ICD-10-CM

## 2024-06-26 DIAGNOSIS — H25.9 AGE-RELATED CATARACT OF BOTH EYES, UNSPECIFIED AGE-RELATED CATARACT TYPE: Primary | ICD-10-CM

## 2024-06-26 PROBLEM — H40.053 OCULAR HYPERTENSION, BILATERAL: Status: ACTIVE | Noted: 2024-06-26

## 2024-06-26 PROBLEM — G89.18 ACUTE POST-OPERATIVE PAIN: Status: ACTIVE | Noted: 2021-07-28

## 2024-06-26 PROBLEM — J90 PLEURAL EFFUSION: Status: ACTIVE | Noted: 2022-05-05

## 2024-06-26 PROBLEM — I48.11 LONGSTANDING PERSISTENT ATRIAL FIBRILLATION (HCC): Status: ACTIVE | Noted: 2022-05-27

## 2024-06-26 PROBLEM — R61 NIGHT SWEATS: Status: ACTIVE | Noted: 2022-04-28

## 2024-06-26 PROBLEM — I48.91 ATRIAL FIBRILLATION WITH RAPID VENTRICULAR RESPONSE (HCC): Status: ACTIVE | Noted: 2022-04-25

## 2024-06-26 PROBLEM — E04.1 THYROID NODULE: Status: ACTIVE | Noted: 2022-05-08

## 2024-06-26 PROBLEM — R61 NIGHT SWEATS: Status: RESOLVED | Noted: 2022-04-28 | Resolved: 2024-06-26

## 2024-06-26 PROBLEM — N99.89 POSTOPERATIVE URINARY RETENTION: Status: ACTIVE | Noted: 2021-07-27

## 2024-06-26 PROBLEM — R09.1 PLEURISY: Status: RESOLVED | Noted: 2022-05-06 | Resolved: 2024-06-26

## 2024-06-26 PROBLEM — R93.89 ABNORMAL IMAGING OF THYROID: Status: RESOLVED | Noted: 2022-04-01 | Resolved: 2024-06-26

## 2024-06-26 PROBLEM — E04.1 THYROID NODULE: Status: RESOLVED | Noted: 2022-05-08 | Resolved: 2024-06-26

## 2024-06-26 PROBLEM — G43.809 VESTIBULAR MIGRAINE: Status: ACTIVE | Noted: 2022-05-06

## 2024-06-26 PROBLEM — R63.4 UNINTENTIONAL WEIGHT LOSS: Status: ACTIVE | Noted: 2022-05-05

## 2024-06-26 PROBLEM — H53.9 VISUAL DISTURBANCE: Status: RESOLVED | Noted: 2019-06-30 | Resolved: 2024-06-26

## 2024-06-26 PROBLEM — H57.11 PAIN, EYE, RIGHT: Status: RESOLVED | Noted: 2024-06-26 | Resolved: 2024-06-26

## 2024-06-26 PROBLEM — I82.432: Status: RESOLVED | Noted: 2022-05-17 | Resolved: 2024-06-26

## 2024-06-26 PROBLEM — R16.0 HEPATOMEGALY: Status: ACTIVE | Noted: 2022-05-06

## 2024-06-26 PROBLEM — I73.9 PERIPHERAL ARTERIAL DISEASE (HCC): Status: RESOLVED | Noted: 2021-07-05 | Resolved: 2024-06-26

## 2024-06-26 PROBLEM — I48.91 ATRIAL FIBRILLATION WITH RAPID VENTRICULAR RESPONSE (HCC): Status: RESOLVED | Noted: 2022-04-25 | Resolved: 2024-06-26

## 2024-06-26 PROBLEM — R63.4 UNINTENTIONAL WEIGHT LOSS: Status: RESOLVED | Noted: 2022-05-05 | Resolved: 2024-06-26

## 2024-06-26 PROBLEM — R33.8 POSTOPERATIVE URINARY RETENTION: Status: ACTIVE | Noted: 2021-07-27

## 2024-06-26 PROBLEM — H57.11 PAIN, EYE, RIGHT: Status: ACTIVE | Noted: 2024-06-26

## 2024-06-26 PROBLEM — H25.10 NUCLEAR SENILE CATARACT: Status: ACTIVE | Noted: 2019-01-11

## 2024-06-26 PROBLEM — R55 VASOVAGAL SYNCOPE: Status: ACTIVE | Noted: 2022-05-06

## 2024-06-26 PROBLEM — T84.023A INSTABILITY OF INTERNAL LEFT KNEE PROSTHESIS (HCC): Status: ACTIVE | Noted: 2021-07-27

## 2024-06-26 PROBLEM — N30.10 INTERSTITIAL CYSTITIS: Status: ACTIVE | Noted: 2023-08-29

## 2024-06-26 PROBLEM — Z95.818 STATUS POST PLACEMENT OF IMPLANTABLE LOOP RECORDER: Status: ACTIVE | Noted: 2022-05-06

## 2024-06-26 PROBLEM — T84.023A INSTABILITY OF INTERNAL LEFT KNEE PROSTHESIS (HCC): Status: RESOLVED | Noted: 2021-07-27 | Resolved: 2024-06-26

## 2024-06-26 PROBLEM — Z96.652 STATUS POST REVISION OF TOTAL KNEE, LEFT: Status: ACTIVE | Noted: 2020-09-09

## 2024-06-26 PROBLEM — R09.1 PLEURISY: Status: ACTIVE | Noted: 2022-05-06

## 2024-06-26 PROBLEM — I73.9 PERIPHERAL ARTERIAL DISEASE (HCC): Status: ACTIVE | Noted: 2021-07-05

## 2024-06-26 PROBLEM — Z86.73 HISTORY OF STROKE: Status: ACTIVE | Noted: 2024-06-26

## 2024-06-26 PROCEDURE — G8399 PT W/DXA RESULTS DOCUMENT: HCPCS | Performed by: INTERNAL MEDICINE

## 2024-06-26 PROCEDURE — G8419 CALC BMI OUT NRM PARAM NOF/U: HCPCS | Performed by: INTERNAL MEDICINE

## 2024-06-26 PROCEDURE — 1036F TOBACCO NON-USER: CPT | Performed by: INTERNAL MEDICINE

## 2024-06-26 PROCEDURE — 1123F ACP DISCUSS/DSCN MKR DOCD: CPT | Performed by: INTERNAL MEDICINE

## 2024-06-26 PROCEDURE — 99214 OFFICE O/P EST MOD 30 MIN: CPT | Performed by: INTERNAL MEDICINE

## 2024-06-26 PROCEDURE — 3017F COLORECTAL CA SCREEN DOC REV: CPT | Performed by: INTERNAL MEDICINE

## 2024-06-26 PROCEDURE — G8427 DOCREV CUR MEDS BY ELIG CLIN: HCPCS | Performed by: INTERNAL MEDICINE

## 2024-06-26 PROCEDURE — 1090F PRES/ABSN URINE INCON ASSESS: CPT | Performed by: INTERNAL MEDICINE

## 2024-06-26 RX ORDER — RIFAMPIN 300 MG/1
600 CAPSULE ORAL
COMMUNITY
Start: 2024-05-20 | End: 2024-08-18

## 2024-06-26 SDOH — ECONOMIC STABILITY: HOUSING INSECURITY
IN THE LAST 12 MONTHS, WAS THERE A TIME WHEN YOU DID NOT HAVE A STEADY PLACE TO SLEEP OR SLEPT IN A SHELTER (INCLUDING NOW)?: NO

## 2024-06-26 SDOH — ECONOMIC STABILITY: FOOD INSECURITY: WITHIN THE PAST 12 MONTHS, YOU WORRIED THAT YOUR FOOD WOULD RUN OUT BEFORE YOU GOT MONEY TO BUY MORE.: NEVER TRUE

## 2024-06-26 SDOH — ECONOMIC STABILITY: FOOD INSECURITY: WITHIN THE PAST 12 MONTHS, THE FOOD YOU BOUGHT JUST DIDN'T LAST AND YOU DIDN'T HAVE MONEY TO GET MORE.: NEVER TRUE

## 2024-06-26 SDOH — ECONOMIC STABILITY: INCOME INSECURITY: HOW HARD IS IT FOR YOU TO PAY FOR THE VERY BASICS LIKE FOOD, HOUSING, MEDICAL CARE, AND HEATING?: NOT HARD AT ALL

## 2024-06-26 ASSESSMENT — PATIENT HEALTH QUESTIONNAIRE - PHQ9
SUM OF ALL RESPONSES TO PHQ QUESTIONS 1-9: 0
SUM OF ALL RESPONSES TO PHQ QUESTIONS 1-9: 0
1. LITTLE INTEREST OR PLEASURE IN DOING THINGS: NOT AT ALL
2. FEELING DOWN, DEPRESSED OR HOPELESS: NOT AT ALL
SUM OF ALL RESPONSES TO PHQ QUESTIONS 1-9: 0
SUM OF ALL RESPONSES TO PHQ9 QUESTIONS 1 & 2: 0
SUM OF ALL RESPONSES TO PHQ QUESTIONS 1-9: 0

## 2024-06-26 NOTE — PROGRESS NOTES
\"Have you been to the ER, urgent care clinic since your last visit?  Hospitalized since your last visit?\"    YES - When: approximately 3 months ago.  Where and Why: [facility] - ablation.    “Have you seen or consulted any other health care providers outside of [facility] since your last visit?”    YES - When: approximately 2 months ago.  Where and Why: [facility] - Dr. BLAKE Whitman.        “Have you had a colorectal cancer screening such as a colonoscopy/FIT/Cologuard?    NO - scheduled for 8/14/24.    Date of last Colonoscopy: 8/29/2018  No cologuard on file  No FIT/FOBT on file   No flexible sigmoidoscopy on file         Click Here for Release of Records Request

## 2024-06-26 NOTE — PROGRESS NOTES
Bella Ang was referred for evaluation by:Dr. Cavazos for Pre- Op Evaluation.  Please see encounter details and orders for consultative summary.    Type of surgery : cataract  Surgery site : both eyes  Anesthesia type: regional  Date of procedure:  7/16/24, 7/23/24    Allergies: No Known Allergies  Latex allergy:  no  Prior reactions to anesthesia: Fentanyl and Versed were not effective for sedation during bronchoscopy  Hx of MRSA:  none    Functional status: she is able to ambulate up 2 flights of step with no shortness of breath, chest pain  Prior cardiac evaluation:   Sees electrophysiology for history of atrial fibrillation requiring ablation earlier this year.  Also has a pacemaker.    Followed by electrophysiology status post pacemaker after cardiac ablation.  Followed by urology for interstitial cystitis.  Also has a right renal mass which appears to be a lipoma.  Followed by infectious disease for MAC infection.  Tolerating 3 drug therapy and CT shows improvement recently.  Followed by endocrinology and surgery at [facility] for papillary thyroid cancer status post thyroidectomy with positive nodes September 2023.  She is off of amiodarone and waiting additional therapy.  Sadly, her son passed away of an overdose December 2023.  She is coping with this loss.    Allergies   Allergen Reactions    Alfentanil Hives and Shortness Of Breath    Codeine Nausea Only and Hallucinations    Levofloxacin Nausea Only    Oxycodone-Acetaminophen Nausea Only and Hallucinations    Tramadol Nausea Only    Chlorhexidine Rash    Morphine Hives, Itching and Rash       Current Outpatient Medications   Medication Sig    rifAMPin (RIFADIN) 300 MG capsule Take 2 capsules by mouth    NONFORMULARY URO tablet as needed    pravastatin (PRAVACHOL) 40 MG tablet TAKE 1 TABLET BY MOUTH DAILY. TAKING 40 MG DAILY 12/16/22    acetaminophen (TYLENOL) 500 MG tablet Take 1 tablet by mouth every 6 hours as needed for Pain    levothyroxine (SYNTHROID)

## 2024-07-10 ENCOUNTER — PATIENT MESSAGE (OUTPATIENT)
Age: 74
End: 2024-07-10

## 2024-07-10 NOTE — TELEPHONE ENCOUNTER
From: Bella Ang  To: Dr. Narendra Gentile  Sent: 7/10/2024 2:24 PM EDT  Subject: Change of Pharmacy for HRT Cream    Hi Dr. Gentile,  Please send a new script for HRT Vaginal Cream to be compounded at:   [facility]  Thanks so much!!  My Best~Melita

## 2024-07-18 RX ORDER — FLUCONAZOLE 150 MG/1
150 TABLET ORAL
Qty: 2 TABLET | Refills: 0 | Status: SHIPPED | OUTPATIENT
Start: 2024-07-18 | End: 2024-07-26

## 2024-10-21 RX ORDER — PRAVASTATIN SODIUM 40 MG
40 TABLET ORAL DAILY
Qty: 90 TABLET | Refills: 3 | Status: SHIPPED | OUTPATIENT
Start: 2024-10-21

## 2024-10-28 DIAGNOSIS — N95.1 VAGINAL DRYNESS, MENOPAUSAL: ICD-10-CM

## 2024-10-30 DIAGNOSIS — N95.1 VAGINAL DRYNESS, MENOPAUSAL: ICD-10-CM

## 2024-11-08 ENCOUNTER — OFFICE VISIT (OUTPATIENT)
Age: 74
End: 2024-11-08
Payer: MEDICARE

## 2024-11-08 VITALS
HEIGHT: 62 IN | HEART RATE: 76 BPM | DIASTOLIC BLOOD PRESSURE: 54 MMHG | TEMPERATURE: 98.1 F | BODY MASS INDEX: 26.65 KG/M2 | RESPIRATION RATE: 16 BRPM | OXYGEN SATURATION: 96 % | WEIGHT: 144.8 LBS | SYSTOLIC BLOOD PRESSURE: 106 MMHG

## 2024-11-08 DIAGNOSIS — M51.361 DEGENERATION OF INTERVERTEBRAL DISC OF LUMBAR REGION WITH LOWER EXTREMITY PAIN: Primary | ICD-10-CM

## 2024-11-08 DIAGNOSIS — M25.551 PAIN OF RIGHT HIP: ICD-10-CM

## 2024-11-08 PROCEDURE — 99213 OFFICE O/P EST LOW 20 MIN: CPT | Performed by: NURSE PRACTITIONER

## 2024-11-08 RX ORDER — RIFAMPIN 300 MG/1
300 CAPSULE ORAL
COMMUNITY

## 2024-11-08 RX ORDER — SEMAGLUTIDE 0.5 MG/.5ML
0.5 INJECTION, SOLUTION SUBCUTANEOUS
COMMUNITY

## 2024-11-08 ASSESSMENT — ENCOUNTER SYMPTOMS
SHORTNESS OF BREATH: 0
EYES NEGATIVE: 1
NAUSEA: 0
ABDOMINAL PAIN: 0
COUGH: 0
BACK PAIN: 1
EYE REDNESS: 0
VOMITING: 0
CHEST TIGHTNESS: 0
EYE PAIN: 0
DIARRHEA: 0
SINUS PAIN: 0
BLOOD IN STOOL: 0
RHINORRHEA: 0
SINUS PRESSURE: 0
RESPIRATORY NEGATIVE: 1
GASTROINTESTINAL NEGATIVE: 1
CONSTIPATION: 0

## 2024-11-08 NOTE — PROGRESS NOTES
Assessment and Plan     1. Degeneration of intervertebral disc of lumbar region with lower extremity pain: Discussed medication treatment for back and hip pain, pt declines. She would like to continue with Tylenol as needed only. Pt requested MRI of lumbar spine, ordered placed. SXS to return discussed.   -     MRI LUMBAR SPINE WO CONTRAST; Future  2. Pain of right hip: Desires to continue with Tylenol. Declines PT referral. Has an appointment with ortho in the coming week.        Benefits, risks, possible drug interactions, and side effects of all new medications were reviewed with the patient. Pt verbalized understanding.      An electronic signature was used to authenticate this note.  Sveta Li, APRN - CNP  11/8/2024      Follow-up and Dispositions    Return if symptoms worsen or fail to improve.          History of Present Illness   Chief Complaint     Bella Ang is a 74 y.o. female here for had concerns including Lower Back Pain.   Mrs. Ang presents today with reports of lower mid back pain. History of DDD of lumbar spine, osteopenia. Pt also reports new onset of R hip pain for the last 2 months which has worsen in the past 2 weeks. Tries pain with Tylenol arthritis with some relief. Both back and hip pain are constant but worsen with movement. [facility] an [facility]. Pt is also following up with neuro surgeon at [facility]. She is requesting an MRI. Denies recent traumas or injuries.     Review of Systems  Review of Systems   Constitutional: Negative.  Negative for chills, fatigue, fever and unexpected weight change.   HENT: Negative.  Negative for congestion, rhinorrhea, sinus pressure and sinus pain.    Eyes: Negative.  Negative for pain, redness and visual disturbance.   Respiratory: Negative.  Negative for cough, chest tightness and shortness of breath.    Cardiovascular: Negative.  Negative for chest pain and palpitations.   Gastrointestinal: Negative.

## 2025-01-08 ENCOUNTER — HOSPITAL ENCOUNTER (EMERGENCY)
Facility: HOSPITAL | Age: 75
Discharge: HOME | End: 2025-01-08
Attending: EMERGENCY MEDICINE
Payer: MEDICARE

## 2025-01-08 VITALS
BODY MASS INDEX: 25.65 KG/M2 | OXYGEN SATURATION: 96 % | DIASTOLIC BLOOD PRESSURE: 67 MMHG | WEIGHT: 140.21 LBS | RESPIRATION RATE: 22 BRPM | TEMPERATURE: 97.1 F | SYSTOLIC BLOOD PRESSURE: 126 MMHG | HEART RATE: 74 BPM

## 2025-01-08 DIAGNOSIS — R53.83 MALAISE AND FATIGUE: Primary | ICD-10-CM

## 2025-01-08 DIAGNOSIS — R63.0 POOR APPETITE: ICD-10-CM

## 2025-01-08 DIAGNOSIS — R79.89 LOW TSH LEVEL: ICD-10-CM

## 2025-01-08 DIAGNOSIS — R53.81 MALAISE AND FATIGUE: Primary | ICD-10-CM

## 2025-01-08 LAB
ALBUMIN SERPL-MCNC: 4.1 G/DL (ref 3.5–5)
ALBUMIN/GLOB SERPL: 1.5 (ref 1.1–2.2)
ALP SERPL-CCNC: 32 U/L (ref 45–117)
ALT SERPL-CCNC: 15 U/L (ref 12–78)
ANION GAP SERPL CALC-SCNC: 8 MMOL/L (ref 2–12)
APPEARANCE UR: CLEAR
AST SERPL-CCNC: 7 U/L (ref 15–37)
BACTERIA URNS QL MICRO: NEGATIVE /HPF
BASOPHILS # BLD: 0.03 K/UL (ref 0–0.1)
BASOPHILS NFR BLD: 0.7 % (ref 0–1)
BILIRUB SERPL-MCNC: 0.2 MG/DL (ref 0.2–1)
BILIRUB UR QL: NEGATIVE
BUN SERPL-MCNC: 13 MG/DL (ref 6–20)
BUN/CREAT SERPL: 17 (ref 12–20)
CALCIUM SERPL-MCNC: 9 MG/DL (ref 8.5–10.1)
CHLORIDE SERPL-SCNC: 104 MMOL/L (ref 97–108)
CO2 SERPL-SCNC: 30 MMOL/L (ref 21–32)
COLOR UR: ABNORMAL
CREAT SERPL-MCNC: 0.75 MG/DL (ref 0.55–1.02)
DIFFERENTIAL METHOD BLD: ABNORMAL
EOSINOPHIL # BLD: 0 K/UL (ref 0–0.4)
EOSINOPHIL NFR BLD: 0 % (ref 0–7)
EPITH CASTS URNS QL MICRO: ABNORMAL /LPF
ERYTHROCYTE [DISTWIDTH] IN BLOOD BY AUTOMATED COUNT: 14.2 % (ref 11.5–14.5)
GLOBULIN SER CALC-MCNC: 2.8 G/DL (ref 2–4)
GLUCOSE SERPL-MCNC: 84 MG/DL (ref 65–100)
GLUCOSE UR STRIP.AUTO-MCNC: NEGATIVE MG/DL
HCT VFR BLD AUTO: 40.2 % (ref 35–47)
HGB BLD-MCNC: 13.3 G/DL (ref 11.5–16)
HGB UR QL STRIP: ABNORMAL
HYALINE CASTS URNS QL MICRO: ABNORMAL /LPF (ref 0–5)
IMM GRANULOCYTES # BLD AUTO: 0.01 K/UL (ref 0–0.04)
IMM GRANULOCYTES NFR BLD AUTO: 0.2 % (ref 0–0.5)
KETONES UR QL STRIP.AUTO: NEGATIVE MG/DL
LEUKOCYTE ESTERASE UR QL STRIP.AUTO: NEGATIVE
LYMPHOCYTES # BLD: 0.66 K/UL (ref 0.8–3.5)
LYMPHOCYTES NFR BLD: 15.7 % (ref 12–49)
MAGNESIUM SERPL-MCNC: 1.9 MG/DL (ref 1.6–2.4)
MCH RBC QN AUTO: 31.1 PG (ref 26–34)
MCHC RBC AUTO-ENTMCNC: 33.1 G/DL (ref 30–36.5)
MCV RBC AUTO: 93.9 FL (ref 80–99)
MONOCYTES # BLD: 0.74 K/UL (ref 0–1)
MONOCYTES NFR BLD: 17.6 % (ref 5–13)
NEUTS SEG # BLD: 2.76 K/UL (ref 1.8–8)
NEUTS SEG NFR BLD: 65.8 % (ref 32–75)
NITRITE UR QL STRIP.AUTO: NEGATIVE
NRBC # BLD: 0 K/UL (ref 0–0.01)
NRBC BLD-RTO: 0 PER 100 WBC
PH UR STRIP: 7 (ref 5–8)
PLATELET # BLD AUTO: 189 K/UL (ref 150–400)
PMV BLD AUTO: 11.1 FL (ref 8.9–12.9)
POTASSIUM SERPL-SCNC: 4.1 MMOL/L (ref 3.5–5.1)
PROT SERPL-MCNC: 6.9 G/DL (ref 6.4–8.2)
PROT UR STRIP-MCNC: NEGATIVE MG/DL
RBC # BLD AUTO: 4.28 M/UL (ref 3.8–5.2)
RBC #/AREA URNS HPF: ABNORMAL /HPF (ref 0–5)
RBC MORPH BLD: ABNORMAL
SODIUM SERPL-SCNC: 142 MMOL/L (ref 136–145)
SP GR UR REFRACTOMETRY: 1.01 (ref 1–1.03)
SPECIMEN HOLD: NORMAL
T4 FREE SERPL-MCNC: 1.1 NG/DL (ref 0.8–1.5)
TSH SERPL DL<=0.05 MIU/L-ACNC: 0.17 UIU/ML (ref 0.36–3.74)
UROBILINOGEN UR QL STRIP.AUTO: 0.2 EU/DL (ref 0.2–1)
WBC # BLD AUTO: 4.2 K/UL (ref 3.6–11)
WBC URNS QL MICRO: ABNORMAL /HPF (ref 0–4)

## 2025-01-08 PROCEDURE — 36415 COLL VENOUS BLD VENIPUNCTURE: CPT

## 2025-01-08 PROCEDURE — 84443 ASSAY THYROID STIM HORMONE: CPT

## 2025-01-08 PROCEDURE — 83735 ASSAY OF MAGNESIUM: CPT

## 2025-01-08 PROCEDURE — 80053 COMPREHEN METABOLIC PANEL: CPT

## 2025-01-08 PROCEDURE — 84439 ASSAY OF FREE THYROXINE: CPT

## 2025-01-08 PROCEDURE — 99283 EMERGENCY DEPT VISIT LOW MDM: CPT

## 2025-01-08 PROCEDURE — 81001 URINALYSIS AUTO W/SCOPE: CPT

## 2025-01-08 PROCEDURE — 85025 COMPLETE CBC W/AUTO DIFF WBC: CPT

## 2025-01-08 ASSESSMENT — LIFESTYLE VARIABLES
HOW MANY STANDARD DRINKS CONTAINING ALCOHOL DO YOU HAVE ON A TYPICAL DAY: PATIENT DOES NOT DRINK
HOW OFTEN DO YOU HAVE A DRINK CONTAINING ALCOHOL: MONTHLY OR LESS
HOW OFTEN DO YOU HAVE A DRINK CONTAINING ALCOHOL: NEVER

## 2025-01-08 NOTE — ED PROVIDER NOTES
[facility] EMERGENCY DEPARTMENT  EMERGENCY DEPARTMENT ENCOUNTER      Pt Name: Bella Ang  MRN: [id number]  Birthdate 1950  Date of evaluation: 1/8/2025  Provider: Tony Quintanilla MD    CHIEF COMPLAINT       Chief Complaint   Patient presents with    Fatigue         HISTORY OF PRESENT ILLNESS    74 y.o. female presents with generalized fatigue since having radioiodine treatment 3 weeks ago. She feels like things taste like they are salty and has not been drinking much. She is concerned she could be dehydrated. All of her care has been at [facility].             Review of External Medical Records:     Nursing Notes were reviewed.    REVIEW OF SYSTEMS       Review of Systems    Except as noted above the remainder of the review of systems was reviewed and negative.       PAST MEDICAL HISTORY     Past Medical History:   Diagnosis Date    A-fib ([facility])     Dr Tatum    Anesthesia complication     developed CSF leak after block for ankle surgery, Fentanyl/Versed ineffective during bronchoscopy    AR (allergic rhinitis)     DDD (degenerative disc disease), lumbar     Dr. Agrawal.  L3-L4, L4-L5 injections 2013. radiates to left knee.MRI 10/2015 mild DJD, no clear stenosis    Deep vein thrombosis (DVT) of popliteal vein of left lower extremity, unspecified chronicity ([facility]) 05/17/2022    x3 left leg; 5/22 most recent    GERD (gastroesophageal reflux disease)     Hyperlipidemia 01/08/2009    Interstitial cystitis 10/2022        Meningioma ([facility]) 07/01/2019    Dr. Blair.  stable 7/2022.  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  Stable 2/2020 T -1.8 L fem neck    Pacemaker 01/2024    Dr. Snow.  after ablation for afib    Papillary thyroid carcinoma ([facility]) 09/2023    Dr. Farmer at [facility]. papillary.  +nodes. s/p total thyroidectomy 9/2023    Pericardial effusion 05/2022    [facility]    Pericarditis, acute 05/2022

## 2025-01-08 NOTE — ED TRIAGE NOTES
In December pt had radiation therapy for her metastatic cancer. Pt always feels fatigued, feels worse since that time. Pt has had a difficult time eating and drinking.

## 2025-02-21 ENCOUNTER — PATIENT MESSAGE (OUTPATIENT)
Facility: CLINIC | Age: 75
End: 2025-02-21

## 2025-02-24 DIAGNOSIS — M51.361 DEGENERATION OF INTERVERTEBRAL DISC OF LUMBAR REGION WITH LOWER EXTREMITY PAIN: Primary | ICD-10-CM

## 2025-02-26 ENCOUNTER — TELEPHONE (OUTPATIENT)
Facility: CLINIC | Age: 75
End: 2025-02-26

## 2025-02-26 DIAGNOSIS — M51.361 DEGENERATION OF INTERVERTEBRAL DISC OF LUMBAR REGION WITH LOWER EXTREMITY PAIN: Primary | ICD-10-CM

## 2025-02-26 NOTE — TELEPHONE ENCOUNTER
Spoke with patient and updated that ALYSIA Samuel has discontinued the MRI and ordered CT of the Lumbar Spine. She states understanding and grateful for the call.

## 2025-02-26 NOTE — TELEPHONE ENCOUNTER
Giana called from [facility] to inform the provider that the patient has an implant that is not compatible with the MRI.

## 2025-03-05 ENCOUNTER — TELEPHONE (OUTPATIENT)
Facility: CLINIC | Age: 75
End: 2025-03-05

## 2025-03-05 NOTE — TELEPHONE ENCOUNTER
Spoke with patient and she requests that her medication script for her Bal-Est to go to [facility].  Grateful for the call.

## 2025-05-10 ENCOUNTER — OFFICE VISIT (OUTPATIENT)
Age: 75
End: 2025-05-10

## 2025-05-10 VITALS
SYSTOLIC BLOOD PRESSURE: 116 MMHG | HEART RATE: 86 BPM | BODY MASS INDEX: 25.03 KG/M2 | OXYGEN SATURATION: 97 % | HEIGHT: 62 IN | WEIGHT: 136 LBS | RESPIRATION RATE: 16 BRPM | DIASTOLIC BLOOD PRESSURE: 76 MMHG | TEMPERATURE: 98.1 F

## 2025-05-10 DIAGNOSIS — L08.9 FINGER INFECTION: Primary | ICD-10-CM

## 2025-05-10 RX ORDER — KETOROLAC TROMETHAMINE 30 MG/ML
30 INJECTION, SOLUTION INTRAMUSCULAR; INTRAVENOUS ONCE
Status: COMPLETED | OUTPATIENT
Start: 2025-05-10 | End: 2025-05-10

## 2025-05-10 RX ORDER — CEPHALEXIN 500 MG/1
500 CAPSULE ORAL 2 TIMES DAILY
Qty: 10 CAPSULE | Refills: 0 | Status: SHIPPED | OUTPATIENT
Start: 2025-05-10 | End: 2025-05-15

## 2025-05-10 RX ADMIN — KETOROLAC TROMETHAMINE 30 MG: 30 INJECTION, SOLUTION INTRAMUSCULAR; INTRAVENOUS at 16:42

## 2025-05-10 ASSESSMENT — ENCOUNTER SYMPTOMS
ALLERGIC/IMMUNOLOGIC NEGATIVE: 1
RESPIRATORY NEGATIVE: 1
GASTROINTESTINAL NEGATIVE: 1
EYES NEGATIVE: 1

## 2025-05-10 NOTE — PROGRESS NOTES
5/10/2025   Bella Ang (: 1950) is a 74 y.o. female, patient, here for evaluation of the following chief complaint(s):  Finger Injury (Pt c/o left hand finger injury to index finger when opening a card board box, thinks the cardboard box went under the nail possible causing an infection. Finger is very swollen, red)       SUBJECTIVE/OBJECTIVE:  HPI       Finger Injury (Pt c/o left hand finger injury to index finger when opening a card board box, thinks the cardboard box went under the nail possible causing an infection. Finger is very swollen, red)       Review of Systems   Constitutional: Negative.    HENT: Negative.     Eyes: Negative.    Respiratory: Negative.     Cardiovascular: Negative.    Gastrointestinal: Negative.    Endocrine: Negative.    Genitourinary: Negative.    Musculoskeletal:         +pain, redness and swelling to left index finger    Skin: Negative.    Allergic/Immunologic: Negative.    Neurological: Negative.    Hematological: Negative.    Psychiatric/Behavioral: Negative.     All other systems reviewed and are negative.        Physical Exam  Vitals and nursing note reviewed.   Constitutional:       General: She is not in acute distress.     Appearance: Normal appearance. She is not ill-appearing or toxic-appearing.   HENT:      Head: Normocephalic.      Right Ear: Tympanic membrane normal.      Left Ear: Tympanic membrane normal.      Nose: Nose normal.      Mouth/Throat:      Mouth: Mucous membranes are moist.      Pharynx: Oropharynx is clear.   Eyes:      Extraocular Movements: Extraocular movements intact.      Pupils: Pupils are equal, round, and reactive to light.   Cardiovascular:      Rate and Rhythm: Normal rate and regular rhythm.   Pulmonary:      Effort: No respiratory distress.      Breath sounds: Normal breath sounds. No stridor. No wheezing, rhonchi or rales.   Abdominal:      General: Abdomen is flat. There is no distension.      Palpations: Abdomen is

## 2025-05-10 NOTE — PATIENT INSTRUCTIONS
Thank you for visiting [facility] today.    Take oral antibiotics on a full stomach until you complete the entire prescription.   Take a probiotic while you are using the antibiotic.    Elevate the area - elevating the arm or leg above the level of the heart can help to reduce swelling and speed healing.  Keep the area clean and dry - it is important to keep the infected area clean and dry.   You can shower or bathe normally and pat the area dry with a clean towel.  You can use a bandage or gauze to protect the skin if needed.  You may use warm, moist compresses for pain relief.  Antibiotics - Most people with cellulitis are treated with an antibiotic that is taken by mouth for 5 to 14 days.   It is important to take the antibiotic exactly as recommended and to finish the entire course of treatment.  Skipping doses or ending treatment early could potentially allow the bacteria to become resistant and require longer treatment or permit the infection to worse after initial improvement.  Time to heal - Resolution of fever and chills, if they were initially present, should occur within one to two days after starting antibiotic therapy.  Local findings of swelling, warmth, and redness should begin to improve within one to three days after starting antibiotics, although these symptoms can persist for two weeks.  If the reddened area becomes larger, more swollen, or more tender, call your healthcare provider.  He or she may want to re-examine you to determine if further testing or an alternate antibiotic is needed.      Please follow up with your PCP within 2 days if your signs and symptoms have not resolved or worsened.    Please go immediately to the ED if you develop fever of 100.4F or above, chills, vomiting, worsening redness/pain/swelling to affected area, red streaks, and/or no improvement after 48 hours of oral antibiotic therapy.

## 2025-06-03 ENCOUNTER — OFFICE VISIT (OUTPATIENT)
Facility: CLINIC | Age: 75
End: 2025-06-03
Payer: MEDICARE

## 2025-06-03 VITALS
BODY MASS INDEX: 24.22 KG/M2 | WEIGHT: 131.6 LBS | DIASTOLIC BLOOD PRESSURE: 62 MMHG | RESPIRATION RATE: 16 BRPM | SYSTOLIC BLOOD PRESSURE: 98 MMHG | OXYGEN SATURATION: 97 % | TEMPERATURE: 97.8 F | HEART RATE: 78 BPM | HEIGHT: 62 IN

## 2025-06-03 DIAGNOSIS — J06.9 UPPER RESPIRATORY TRACT INFECTION, UNSPECIFIED TYPE: Primary | ICD-10-CM

## 2025-06-03 DIAGNOSIS — C73 PAPILLARY THYROID CARCINOMA (HCC): ICD-10-CM

## 2025-06-03 LAB
GROUP A STREP ANTIGEN, POC: NEGATIVE
VALID INTERNAL CONTROL, POC: YES

## 2025-06-03 PROCEDURE — 3017F COLORECTAL CA SCREEN DOC REV: CPT | Performed by: NURSE PRACTITIONER

## 2025-06-03 PROCEDURE — 99213 OFFICE O/P EST LOW 20 MIN: CPT | Performed by: NURSE PRACTITIONER

## 2025-06-03 PROCEDURE — 87880 STREP A ASSAY W/OPTIC: CPT | Performed by: NURSE PRACTITIONER

## 2025-06-03 PROCEDURE — 1159F MED LIST DOCD IN RCRD: CPT | Performed by: NURSE PRACTITIONER

## 2025-06-03 PROCEDURE — G8420 CALC BMI NORM PARAMETERS: HCPCS | Performed by: NURSE PRACTITIONER

## 2025-06-03 PROCEDURE — G8427 DOCREV CUR MEDS BY ELIG CLIN: HCPCS | Performed by: NURSE PRACTITIONER

## 2025-06-03 PROCEDURE — 1123F ACP DISCUSS/DSCN MKR DOCD: CPT | Performed by: NURSE PRACTITIONER

## 2025-06-03 PROCEDURE — 1036F TOBACCO NON-USER: CPT | Performed by: NURSE PRACTITIONER

## 2025-06-03 PROCEDURE — G8399 PT W/DXA RESULTS DOCUMENT: HCPCS | Performed by: NURSE PRACTITIONER

## 2025-06-03 PROCEDURE — 1090F PRES/ABSN URINE INCON ASSESS: CPT | Performed by: NURSE PRACTITIONER

## 2025-06-03 PROCEDURE — PBSHW AMB POC RAPID STREP A: Performed by: NURSE PRACTITIONER

## 2025-06-03 PROCEDURE — 1160F RVW MEDS BY RX/DR IN RCRD: CPT | Performed by: NURSE PRACTITIONER

## 2025-06-03 RX ORDER — LIDOCAINE HYDROCHLORIDE 20 MG/ML
15 SOLUTION OROPHARYNGEAL
Qty: 100 ML | Refills: 0 | Status: SHIPPED | OUTPATIENT
Start: 2025-06-03

## 2025-06-03 RX ORDER — METHYLPREDNISOLONE 4 MG/1
TABLET ORAL
Qty: 1 KIT | Refills: 0 | Status: SHIPPED | OUTPATIENT
Start: 2025-06-03

## 2025-06-03 ASSESSMENT — ENCOUNTER SYMPTOMS
SORE THROAT: 1
CHEST TIGHTNESS: 0
GASTROINTESTINAL NEGATIVE: 1
DIARRHEA: 0
ABDOMINAL PAIN: 0
BLOOD IN STOOL: 0
SINUS PAIN: 0
NAUSEA: 0
RESPIRATORY NEGATIVE: 1
CONSTIPATION: 0
RHINORRHEA: 0
SHORTNESS OF BREATH: 0
EYE REDNESS: 0
COUGH: 0
EYES NEGATIVE: 1
BACK PAIN: 0
VOMITING: 0
SINUS PRESSURE: 0
EYE PAIN: 0

## 2025-06-03 NOTE — PROGRESS NOTES
Assessment and Plan     1. Upper respiratory tract infection, unspecified type: Negative strep A testing in office. Continue with tylenol for pain and fever as needed, can take 3 grams daily. Symptom management with Medrol pack and Lidocaine mouth rinse. Return instructions given. Pt agreed with plan.   -     AMB POC RAPID STREP A  -     methylPREDNISolone (MEDROL DOSEPACK) 4 MG tablet; Follow-up package instructions, Disp-1 kit, R-0Normal  -     lidocaine viscous hcl (XYLOCAINE) 2 % SOLN solution; Take 15 mLs by mouth every 6-8 hours as needed for Irritation, Disp-100 mL, R-0Normal  2. Papillary thyroid carcinoma (HCC): Will follow up with endocrinologist oncologist through [facility]     Tests Preformed During Visit:    Results for orders placed or performed in visit on 06/03/25   AMB POC RAPID STREP A   Result Value Ref Range    Valid Internal Control, POC YES     Group A Strep Antigen, POC Negative       Benefits, risks, possible drug interactions, and side effects of all new medications were reviewed with the patient. Pt verbalized understanding.  An electronic signature was used to authenticate this note.  Sveta Li, CUBA - CNP  6/3/2025      Follow-up and Dispositions    Return if symptoms worsen or fail to improve.          History of Present Illness   Chief Complaint     Bella Ang is a 74 y.o. female here for had concerns including Pharyngitis (Sore throat, large ulcers on the roof of his mouth, swollen/painful neck nodules for 5 days. Denies fever, chills or body aches).   Mrs. Ang presents today with reports of sore throat and ulcers to roof of mouth, swollen cervical lymph nodes for the past 5 days. Has tried tylenol as needed for pain with no improvement. Denies sick contacts. Pt has history of thyroid cancer, diagnosed and treated 6 months ago through [facility] oncologist  Will have lab work and thyroid images June 19. Brain MRI is scheduled for June 11th  Saw dentist 2 weeks ago. Denies

## 2025-06-27 DIAGNOSIS — N95.1 VAGINAL DRYNESS, MENOPAUSAL: ICD-10-CM

## (undated) DEVICE — SUTURE MCRYL SZ 4-0 L18IN ABSRB UD P-3 L13MM 3/8 CIR PRIM Y494G

## (undated) DEVICE — SPLINT CAST W4XL15IN GRN STRENGTH PLSTR OF PARIS FAST SET

## (undated) DEVICE — GLOVE SURG SZ 7 L12IN FNGR THK79MIL GRN LTX FREE

## (undated) DEVICE — SPONGE GZ W4XL4IN COT 12 PLY TYP VII WVN C FLD DSGN STERILE

## (undated) DEVICE — DRESSING GZ FLUF 36X36 IN RND 2 PLY PD GZ AMER WHT CROSS

## (undated) DEVICE — GLOVE ORANGE PI 7   MSG9070

## (undated) DEVICE — PAD,ABDOMINAL,8"X10",ST,LF: Brand: MEDLINE

## (undated) DEVICE — PENCIL ES L3M ROCK SWCH S STL HEX LOK BLDE ELECTRD HOLSTER

## (undated) DEVICE — ELECTRODE PT RET AD L9FT HI MOIST COND ADH HYDRGEL CORDED

## (undated) DEVICE — BLADE OPHTH GRN ROUNDED TIP 1 SIDE SHRP GRINDLESS MINI-BLDE

## (undated) DEVICE — HAND-SFMCASU: Brand: MEDLINE INDUSTRIES, INC.

## (undated) DEVICE — BANDAGE COBAN 4 IN COMPR W4INXL5YD FOAM COHESIVE QUIK STK SELF ADH SFT

## (undated) DEVICE — BLADE,CARBON-STEEL,15,STRL,DISPOSABLE,TB: Brand: MEDLINE

## (undated) DEVICE — ZIMMER® STERILE DISPOSABLE TOURNIQUET CUFF WITH PROTECTIVE SLEEVE AND PLC, DUAL PORT, SINGLE BLADDER, 18 IN. (46 CM)

## (undated) DEVICE — ELECTRODE ELECSURG NDL 2.8 INX7.2 CM COAT INSUL EDGE

## (undated) DEVICE — SUTURE ETHLN SZ 4-0 L18IN NONABSORBABLE BLK L19MM PS-2 3/8 1667H

## (undated) DEVICE — SOLUTION IRRIG 500ML 0.9% SOD CHLO USP POUR PLAS BTL

## (undated) DEVICE — SUTURE FIBERWIRE 4-0 L18IN NONABSORBABLE BLU L12.3MM 3/8 AR723001

## (undated) DEVICE — SOLUTION IRRIG 1000ML STRL H2O USP PLAS POUR BTL